# Patient Record
Sex: FEMALE | Race: BLACK OR AFRICAN AMERICAN | Employment: FULL TIME | URBAN - METROPOLITAN AREA
[De-identification: names, ages, dates, MRNs, and addresses within clinical notes are randomized per-mention and may not be internally consistent; named-entity substitution may affect disease eponyms.]

---

## 2017-02-20 ENCOUNTER — OFFICE VISIT (OUTPATIENT)
Dept: INTERNAL MEDICINE CLINIC | Facility: CLINIC | Age: 57
End: 2017-02-20

## 2017-02-20 VITALS
SYSTOLIC BLOOD PRESSURE: 118 MMHG | DIASTOLIC BLOOD PRESSURE: 70 MMHG | BODY MASS INDEX: 29.03 KG/M2 | WEIGHT: 196 LBS | HEART RATE: 70 BPM | TEMPERATURE: 98 F | HEIGHT: 69 IN | OXYGEN SATURATION: 98 % | RESPIRATION RATE: 16 BRPM

## 2017-02-20 DIAGNOSIS — E03.9 ACQUIRED HYPOTHYROIDISM: ICD-10-CM

## 2017-02-20 DIAGNOSIS — I10 ESSENTIAL HYPERTENSION: Primary | ICD-10-CM

## 2017-02-20 DIAGNOSIS — H40.10X0 OPEN-ANGLE GLAUCOMA OF LEFT EYE, UNSPECIFIED GLAUCOMA STAGE, UNSPECIFIED OPEN-ANGLE GLAUCOMA TYPE: ICD-10-CM

## 2017-02-20 DIAGNOSIS — Z86.19 HISTORY OF HPV INFECTION: ICD-10-CM

## 2017-02-20 PROCEDURE — 99213 OFFICE O/P EST LOW 20 MIN: CPT | Performed by: INTERNAL MEDICINE

## 2017-02-20 RX ORDER — VALACYCLOVIR HYDROCHLORIDE 500 MG/1
500 TABLET, FILM COATED ORAL 3 TIMES DAILY
Qty: 90 TABLET | Refills: 1 | Status: SHIPPED | OUTPATIENT
Start: 2017-02-20 | End: 2018-10-02

## 2017-02-20 NOTE — PROGRESS NOTES
Jacky Sharp is a 64year old female. Patient presents with:  Physical      HPI:       Patient moved back to [place] after living in [place] for 11 years. Mucus lower throat 2 weeks, wont go away, clear; bad taste in mouth. Not feeling sick.    Du System:  CONSTITUTION: denies fevers,  chills, or sweats  HEENT: she wishes referral to opthamologist  CARDIOVASCULAR: denies chest pain, denies palpitations  RESPIRATORY:denies cough, denies shortness of breath, denies wheezing  GASTROINTESTINAL: denies a old.  Patient advised to have her son see ophthalmology.   Referral to ophthalmologist given today    (E03.9) Acquired hypothyroidism  Plan: FREE T4 (FREE THYROXINE), ASSAY, THYROID STIM         HORMONE        Check TSH, free T4    (Z86.19) History of HPV i

## 2017-02-21 NOTE — PATIENT INSTRUCTIONS
With patient's complaints of mucus, advised her to drink copious warm fluids such as hot tea with lemon. Snacks as needed call if any worsening or problems.

## 2017-02-22 ENCOUNTER — NURSE ONLY (OUTPATIENT)
Dept: INTERNAL MEDICINE CLINIC | Facility: CLINIC | Age: 57
End: 2017-02-22

## 2017-02-22 DIAGNOSIS — I10 HTN (HYPERTENSION), BENIGN: Primary | ICD-10-CM

## 2017-02-22 DIAGNOSIS — I10 ESSENTIAL HYPERTENSION: ICD-10-CM

## 2017-02-22 DIAGNOSIS — E03.9 HYPOTHYROIDISM, UNSPECIFIED TYPE: ICD-10-CM

## 2017-02-22 DIAGNOSIS — E03.9 ACQUIRED HYPOTHYROIDISM: ICD-10-CM

## 2017-02-22 LAB
ALBUMIN SERPL BCP-MCNC: 4 G/DL (ref 3.5–4.8)
ALBUMIN/GLOB SERPL: 1.3 {RATIO} (ref 1–2)
ALP SERPL-CCNC: 56 U/L (ref 32–100)
ALT SERPL-CCNC: 20 U/L (ref 14–54)
ANION GAP SERPL CALC-SCNC: 9 MMOL/L (ref 0–18)
AST SERPL-CCNC: 23 U/L (ref 15–41)
BASOPHILS # BLD: 0.1 K/UL (ref 0–0.2)
BASOPHILS NFR BLD: 2 %
BILIRUB SERPL-MCNC: 0.6 MG/DL (ref 0.3–1.2)
BUN SERPL-MCNC: 7 MG/DL (ref 8–20)
BUN/CREAT SERPL: 8.9 (ref 10–20)
CALCIUM SERPL-MCNC: 9.1 MG/DL (ref 8.5–10.5)
CHLORIDE SERPL-SCNC: 101 MMOL/L (ref 95–110)
CHOLEST SERPL-MCNC: 185 MG/DL (ref 110–200)
CO2 SERPL-SCNC: 27 MMOL/L (ref 22–32)
CREAT SERPL-MCNC: 0.79 MG/DL (ref 0.5–1.5)
EOSINOPHIL # BLD: 0.1 K/UL (ref 0–0.7)
EOSINOPHIL NFR BLD: 2 %
ERYTHROCYTE [DISTWIDTH] IN BLOOD BY AUTOMATED COUNT: 14.2 % (ref 11–15)
GLOBULIN PLAS-MCNC: 3.1 G/DL (ref 2.5–3.7)
GLUCOSE SERPL-MCNC: 77 MG/DL (ref 70–99)
HCT VFR BLD AUTO: 39.6 % (ref 35–48)
HDLC SERPL-MCNC: 57 MG/DL
HGB BLD-MCNC: 12.8 G/DL (ref 12–16)
LDLC SERPL CALC-MCNC: 111 MG/DL (ref 0–99)
LYMPHOCYTES # BLD: 1.3 K/UL (ref 1–4)
LYMPHOCYTES NFR BLD: 40 %
MCH RBC QN AUTO: 29.2 PG (ref 27–32)
MCHC RBC AUTO-ENTMCNC: 32.4 G/DL (ref 32–37)
MCV RBC AUTO: 90.2 FL (ref 80–100)
MONOCYTES # BLD: 0.2 K/UL (ref 0–1)
MONOCYTES NFR BLD: 7 %
NEUTROPHILS # BLD AUTO: 1.6 K/UL (ref 1.8–7.7)
NEUTROPHILS NFR BLD: 49 %
NONHDLC SERPL-MCNC: 128 MG/DL
OSMOLALITY UR CALC.SUM OF ELEC: 281 MOSM/KG (ref 275–295)
PLATELET # BLD AUTO: 204 K/UL (ref 140–400)
PMV BLD AUTO: 10 FL (ref 7.4–10.3)
POTASSIUM SERPL-SCNC: 3.7 MMOL/L (ref 3.3–5.1)
PROT SERPL-MCNC: 7.1 G/DL (ref 5.9–8.4)
RBC # BLD AUTO: 4.39 M/UL (ref 3.7–5.4)
SODIUM SERPL-SCNC: 137 MMOL/L (ref 136–144)
T4 FREE SERPL-MCNC: 0.91 NG/DL (ref 0.58–1.64)
TRIGL SERPL-MCNC: 86 MG/DL (ref 1–149)
TSH SERPL-ACNC: 0.98 UIU/ML (ref 0.34–5.6)
WBC # BLD AUTO: 3.2 K/UL (ref 4–11)

## 2017-02-22 PROCEDURE — 86803 HEPATITIS C AB TEST: CPT | Performed by: INTERNAL MEDICINE

## 2017-02-22 PROCEDURE — 84439 ASSAY OF FREE THYROXINE: CPT | Performed by: INTERNAL MEDICINE

## 2017-02-22 PROCEDURE — 82306 VITAMIN D 25 HYDROXY: CPT | Performed by: INTERNAL MEDICINE

## 2017-02-22 PROCEDURE — 84443 ASSAY THYROID STIM HORMONE: CPT | Performed by: INTERNAL MEDICINE

## 2017-02-22 PROCEDURE — 80053 COMPREHEN METABOLIC PANEL: CPT | Performed by: INTERNAL MEDICINE

## 2017-02-22 PROCEDURE — 85025 COMPLETE CBC W/AUTO DIFF WBC: CPT | Performed by: INTERNAL MEDICINE

## 2017-02-22 PROCEDURE — 80061 LIPID PANEL: CPT | Performed by: INTERNAL MEDICINE

## 2017-02-22 PROCEDURE — 36415 COLL VENOUS BLD VENIPUNCTURE: CPT | Performed by: INTERNAL MEDICINE

## 2017-02-22 NOTE — PROGRESS NOTES
Patient presented in office today for fasting blood draw. Blood draw successful in left arm  Salvador:  Cbc,cmp,lipid,tsh,HCV,HIV,vitamin D  D. O.B verified   Orders per Doctor Co

## 2017-02-23 ENCOUNTER — TELEPHONE (OUTPATIENT)
Dept: INTERNAL MEDICINE CLINIC | Facility: CLINIC | Age: 57
End: 2017-02-23

## 2017-02-23 DIAGNOSIS — S69.92XA INJURY TO THUMB (NAIL), LEFT, INITIAL ENCOUNTER: Primary | ICD-10-CM

## 2017-02-23 LAB — HCV AB SERPL QL IA: NONREACTIVE

## 2017-02-24 LAB — 25(OH)D3 SERPL-MCNC: 28.5 NG/ML

## 2017-02-27 NOTE — TELEPHONE ENCOUNTER
----- Message from Oj Hdez MD sent at 2/25/2017  3:09 PM CST -----  Please make sure that patient has another appointment with Dr. Jorge Mcneal or myself regarding abnormal labs, such as low vitamin D and neutropenia.   I would like appointment to be w

## 2017-03-15 ENCOUNTER — NURSE ONLY (OUTPATIENT)
Dept: INTERNAL MEDICINE CLINIC | Facility: CLINIC | Age: 57
End: 2017-03-15

## 2017-03-15 DIAGNOSIS — Z12.11 SCREEN FOR COLON CANCER: Primary | ICD-10-CM

## 2017-03-15 LAB — HEMOCCULT STL QL: NEGATIVE

## 2017-03-15 PROCEDURE — 82274 ASSAY TEST FOR BLOOD FECAL: CPT | Performed by: INTERNAL MEDICINE

## 2017-03-16 ENCOUNTER — OFFICE VISIT (OUTPATIENT)
Dept: INTERNAL MEDICINE CLINIC | Facility: CLINIC | Age: 57
End: 2017-03-16

## 2017-03-16 DIAGNOSIS — E03.8 OTHER SPECIFIED HYPOTHYROIDISM: ICD-10-CM

## 2017-03-16 DIAGNOSIS — J30.89 NON-SEASONAL ALLERGIC RHINITIS DUE TO OTHER ALLERGIC TRIGGER: ICD-10-CM

## 2017-03-16 DIAGNOSIS — Z12.11 ENCOUNTER FOR SCREENING COLONOSCOPY: ICD-10-CM

## 2017-03-16 DIAGNOSIS — D70.9 NEUTROPENIA, UNSPECIFIED TYPE (HCC): Primary | ICD-10-CM

## 2017-03-16 DIAGNOSIS — E55.9 VITAMIN D DEFICIENCY: ICD-10-CM

## 2017-03-16 DIAGNOSIS — I10 ESSENTIAL HYPERTENSION: ICD-10-CM

## 2017-03-16 LAB
FOLATE SERPL-MCNC: 11.1 NG/ML
VIT B12 SERPL-MCNC: 837 PG/ML (ref 181–914)

## 2017-03-16 PROCEDURE — 93000 ELECTROCARDIOGRAM COMPLETE: CPT | Performed by: INTERNAL MEDICINE

## 2017-03-16 PROCEDURE — 99214 OFFICE O/P EST MOD 30 MIN: CPT | Performed by: INTERNAL MEDICINE

## 2017-03-16 PROCEDURE — 82746 ASSAY OF FOLIC ACID SERUM: CPT | Performed by: INTERNAL MEDICINE

## 2017-03-16 PROCEDURE — 82607 VITAMIN B-12: CPT | Performed by: INTERNAL MEDICINE

## 2017-03-16 RX ORDER — DORZOLAMIDE HYDROCHLORIDE AND TIMOLOL MALEATE 20; 5 MG/ML; MG/ML
1 SOLUTION/ DROPS OPHTHALMIC 2 TIMES DAILY
COMMUNITY
End: 2017-06-20

## 2017-03-16 RX ORDER — MOMETASONE 50 UG/1
1 SPRAY, METERED NASAL DAILY
Qty: 1 BOTTLE | Refills: 0 | Status: SHIPPED | OUTPATIENT
Start: 2017-03-16 | End: 2017-04-15

## 2017-03-16 NOTE — PROGRESS NOTES
Lisa Martin is a 64year old female. Patient presents with:  Lab Results: Pt presents to clinic as f/up to discuss recent lab results from late February physical.   Wrist Pain: Pt also c/o left wrist pain x 3wks after workout.        HPI:         Still Alcohol Use: Yes           0.0 oz/week       0 Standard drinks or equivalent per week       Comment: OCC         Review of System:  CONSTITUTION: denies fevers,  chills, or sweats  HEENT: denies sore throat  CARDIOVASCULAR: denies chest pain, denies palpi Discussed possible allergic triggers.   Advised how to use steroid nasal sprays    (I10) Essential hypertension  Plan: ELECTROCARDIOGRAM, COMPLETE       Patient taking one half of valsartan daily, to follow blood pressure at home and take home blood pressur

## 2017-03-17 VITALS
BODY MASS INDEX: 29.33 KG/M2 | WEIGHT: 198 LBS | SYSTOLIC BLOOD PRESSURE: 132 MMHG | OXYGEN SATURATION: 99 % | RESPIRATION RATE: 12 BRPM | HEART RATE: 58 BPM | HEIGHT: 69 IN | DIASTOLIC BLOOD PRESSURE: 88 MMHG

## 2017-03-21 RX ORDER — LEVOTHYROXINE SODIUM 75 MCG
75 TABLET ORAL
Qty: 90 TABLET | Refills: 1 | Status: SHIPPED | OUTPATIENT
Start: 2017-03-21 | End: 2017-09-12

## 2017-04-10 ENCOUNTER — OFFICE VISIT (OUTPATIENT)
Dept: INTERNAL MEDICINE CLINIC | Facility: CLINIC | Age: 57
End: 2017-04-10

## 2017-04-10 ENCOUNTER — MED REC SCAN ONLY (OUTPATIENT)
Dept: INTERNAL MEDICINE CLINIC | Facility: CLINIC | Age: 57
End: 2017-04-10

## 2017-04-10 VITALS
WEIGHT: 199 LBS | TEMPERATURE: 99 F | SYSTOLIC BLOOD PRESSURE: 110 MMHG | HEART RATE: 71 BPM | HEIGHT: 69 IN | RESPIRATION RATE: 12 BRPM | BODY MASS INDEX: 29.47 KG/M2 | OXYGEN SATURATION: 98 % | DIASTOLIC BLOOD PRESSURE: 80 MMHG

## 2017-04-10 DIAGNOSIS — E03.9 ACQUIRED HYPOTHYROIDISM: ICD-10-CM

## 2017-04-10 DIAGNOSIS — M65.4 DE QUERVAIN'S TENOSYNOVITIS, LEFT: Primary | ICD-10-CM

## 2017-04-10 PROCEDURE — 99212 OFFICE O/P EST SF 10 MIN: CPT | Performed by: INTERNAL MEDICINE

## 2017-04-10 NOTE — PROGRESS NOTES
Ghanshyam Lawson is a 64year old female. Patient presents with:  Wrist Pain: pt presents to clinic c/o left wrist pain. pt injured wrist lifting weights       HPI:       Soledad Lockett had acute pain in her left wrist after lifting 5 pound weights March 16, 2017. Smokeless Status: Never Used                        Comment: never smoked     Alcohol Use: Yes           0.0 oz/week       0 Standard drinks or equivalent per week       Comment: OCC         Review of System:  CONSTITUTION: denies fevers,  chills, or sweat

## 2017-04-11 ENCOUNTER — HOSPITAL ENCOUNTER (OUTPATIENT)
Dept: GENERAL RADIOLOGY | Facility: HOSPITAL | Age: 57
Discharge: HOME | End: 2017-04-11
Attending: INTERNAL MEDICINE
Payer: COMMERCIAL

## 2017-04-11 DIAGNOSIS — M65.4 DE QUERVAIN'S TENOSYNOVITIS, LEFT: ICD-10-CM

## 2017-04-11 PROCEDURE — 73110 X-RAY EXAM OF WRIST: CPT

## 2017-04-14 ENCOUNTER — TELEPHONE (OUTPATIENT)
Dept: INTERNAL MEDICINE CLINIC | Facility: CLINIC | Age: 57
End: 2017-04-14

## 2017-04-14 DIAGNOSIS — M25.532 LEFT WRIST PAIN: Primary | ICD-10-CM

## 2017-04-21 NOTE — TELEPHONE ENCOUNTER
Patient states she is feeling generally better, but still has some issues with her wrist such as when she was lifting a laundry basket. X-ray reviewed with her, and she is advised of results. She is referred to Dr. Gena Blum for evaluation.

## 2017-04-23 ENCOUNTER — TELEPHONE (OUTPATIENT)
Dept: INTERNAL MEDICINE CLINIC | Facility: CLINIC | Age: 57
End: 2017-04-23

## 2017-04-24 NOTE — TELEPHONE ENCOUNTER
Patient was called April 21 regarding report of wrist x-ray. She is still having discomfort in that wrist.  X-ray report reviewed with patient. She is referred to Dr. Maureen Springer orthopedic surgery.

## 2017-06-10 ENCOUNTER — OFFICE VISIT (OUTPATIENT)
Dept: OPHTHALMOLOGY | Facility: CLINIC | Age: 57
End: 2017-06-10

## 2017-06-10 DIAGNOSIS — H40.003 GLAUCOMA SUSPECT OF BOTH EYES: Primary | ICD-10-CM

## 2017-06-10 DIAGNOSIS — H25.13 AGE-RELATED NUCLEAR CATARACT OF BOTH EYES: ICD-10-CM

## 2017-06-10 DIAGNOSIS — Z83.511 FAMILY HISTORY OF GLAUCOMA IN MOTHER: ICD-10-CM

## 2017-06-10 DIAGNOSIS — Z83.511 FAMILY HISTORY OF GLAUCOMA IN SISTER: ICD-10-CM

## 2017-06-10 PROCEDURE — 99243 OFF/OP CNSLTJ NEW/EST LOW 30: CPT | Performed by: OPHTHALMOLOGY

## 2017-06-10 PROCEDURE — 92250 FUNDUS PHOTOGRAPHY W/I&R: CPT | Performed by: OPHTHALMOLOGY

## 2017-06-10 PROCEDURE — 99213 OFFICE O/P EST LOW 20 MIN: CPT | Performed by: OPHTHALMOLOGY

## 2017-06-10 RX ORDER — ESTRADIOL 0.1 MG/G
CREAM VAGINAL
COMMUNITY
Start: 2017-04-20 | End: 2018-02-21 | Stop reason: ALTCHOICE

## 2017-06-10 NOTE — PROGRESS NOTES
Maggy Mayfield is a 64year old female.     HPI:     HPI     Consult    Additional comments: Consult per Dr. Carrillo Reyesshania was diagnosed with glaucoma OU in 2008 by Dr. Ion France and is currently taking Latanoprost OU qhs and Dorzola Rfl: 1   latanoprost 0.005 % Ophthalmic Solution  Disp:  Rfl:    Valsartan-Hydrochlorothiazide 80-12.5 MG Oral Tab Take 1 tablet by mouth daily.  Patient takes half daily  Disp:  Rfl:        Allergies:    Seasonal                    ROS:     ROS     Positiv ASSESSMENT/PLAN:     Diagnoses and Plan:     Glaucoma suspect of both eyes  Pt was diagnosed with glaucoma by Dr. Antonio Tian in 2008. Intraocular pressure stable, tolerating medications.   Will continue same regimen of Latanoprost; one drop in both e

## 2017-06-10 NOTE — PATIENT INSTRUCTIONS
Glaucoma suspect of both eyes  Pt was diagnosed with glaucoma by Dr. Jami Cesar in 2008. Intraocular pressure stable, tolerating medications.   Will continue same regimen of Latanoprost; one drop in both eyes at bedtime and Dorzolamide- Timolol twice a

## 2017-06-10 NOTE — ASSESSMENT & PLAN NOTE
Pt was diagnosed with glaucoma by Dr. Jadyn Hamlin in 2008. Intraocular pressure stable, tolerating medications. Will continue same regimen of Latanoprost; one drop in both eyes at bedtime and Dorzolamide- Timolol twice a day in both eyes. (she has enou

## 2017-06-17 ENCOUNTER — OFFICE VISIT (OUTPATIENT)
Dept: OPHTHALMOLOGY | Facility: CLINIC | Age: 57
End: 2017-06-17

## 2017-06-17 DIAGNOSIS — H40.003 GLAUCOMA SUSPECT OF BOTH EYES: ICD-10-CM

## 2017-06-17 PROCEDURE — 92083 EXTENDED VISUAL FIELD XM: CPT | Performed by: OPHTHALMOLOGY

## 2017-06-17 PROCEDURE — 92133 CPTRZD OPH DX IMG PST SGM ON: CPT | Performed by: OPHTHALMOLOGY

## 2017-06-17 PROCEDURE — 76514 ECHO EXAM OF EYE THICKNESS: CPT | Performed by: OPHTHALMOLOGY

## 2017-06-20 ENCOUNTER — TELEPHONE (OUTPATIENT)
Dept: OPHTHALMOLOGY | Facility: CLINIC | Age: 57
End: 2017-06-20

## 2017-06-20 NOTE — TELEPHONE ENCOUNTER
Spoke with patient and verbally told her that her glaucoma tests were normal in both eyes. I told her that Dr. Stephen Fisher wants to closely follow her pressure, but wants her to stop the Cosopt drops and continue the Latanoprost qhs OU.   Appt was scheduled f

## 2017-06-20 NOTE — PROGRESS NOTES
Alex Granda is a 64year old female. HPI:     HPI     Patient here for a glaucoma work up with HVF, OCT and Pachy, no M.D.        Last edited by Isauro Fajardo on 6/17/2017  8:11 AM.     Patient History:  Past Medical History   Diagnosis Date   • Lan ASSESSMENT/PLAN:     Diagnoses and Plan:     Glaucoma suspect of both eyes  Normal VF, OCT, OU. Continue Latanoprost OU Q HS. Discontinue Cosopt OU BID. No orders of the defined types were placed in this encounter.        Meds This Visi

## 2017-07-08 ENCOUNTER — OFFICE VISIT (OUTPATIENT)
Dept: OPHTHALMOLOGY | Facility: CLINIC | Age: 57
End: 2017-07-08

## 2017-07-08 DIAGNOSIS — Z83.511 FAMILY HISTORY OF GLAUCOMA IN SISTER: ICD-10-CM

## 2017-07-08 DIAGNOSIS — H40.003 GLAUCOMA SUSPECT OF BOTH EYES: Primary | ICD-10-CM

## 2017-07-08 DIAGNOSIS — Z83.511 FAMILY HISTORY OF GLAUCOMA IN MOTHER: ICD-10-CM

## 2017-07-08 PROCEDURE — 99212 OFFICE O/P EST SF 10 MIN: CPT | Performed by: OPHTHALMOLOGY

## 2017-07-08 PROCEDURE — 99213 OFFICE O/P EST LOW 20 MIN: CPT | Performed by: OPHTHALMOLOGY

## 2017-07-08 NOTE — PATIENT INSTRUCTIONS
Glaucoma suspect of both eyes  IOP is stable after discontinuing Cosopt in June of 2017 due to normal visual field and OCT. Continue Latanoprost at bedtime in both eyes. Will have patient back in 2 months for a pressure check.      Family history of glau

## 2017-07-08 NOTE — ASSESSMENT & PLAN NOTE
IOP is stable after discontinuing Cosopt in June of 2017 due to normal visual field and OCT. Continue Latanoprost at bedtime in both eyes. Will have patient back in 2 months for a pressure check.

## 2017-07-08 NOTE — PROGRESS NOTES
Fanta Hernandez is a 64year old female. HPI:     HPI     Patient is here for an IOP check after Cosopt was discontinued on 6/20/17 after normal VF and OCT. She still is using Latanoprost qhs OU.   She has not noticed any problems or changes with her eye 80-12.5 MG Oral Tab Take 1 tablet by mouth daily.  Patient takes half daily  Disp:  Rfl:        Allergies:    Seasonal                    ROS:       PHYSICAL EXAM:     Base Eye Exam     Visual Acuity (Snellen - Linear)       Right Left    Dist cc 20/20 20/2

## 2017-08-16 ENCOUNTER — TELEPHONE (OUTPATIENT)
Dept: INTERNAL MEDICINE CLINIC | Facility: CLINIC | Age: 57
End: 2017-08-16

## 2017-08-30 ENCOUNTER — TELEPHONE (OUTPATIENT)
Dept: OPHTHALMOLOGY | Facility: CLINIC | Age: 57
End: 2017-08-30

## 2017-09-12 RX ORDER — LEVOTHYROXINE SODIUM 75 MCG
75 TABLET ORAL
Qty: 90 TABLET | Refills: 1 | Status: SHIPPED | OUTPATIENT
Start: 2017-09-12 | End: 2017-10-11 | Stop reason: DRUGHIGH

## 2017-09-16 ENCOUNTER — OFFICE VISIT (OUTPATIENT)
Dept: OPHTHALMOLOGY | Facility: CLINIC | Age: 57
End: 2017-09-16

## 2017-09-16 DIAGNOSIS — Z83.511 FAMILY HISTORY OF GLAUCOMA IN SISTER: ICD-10-CM

## 2017-09-16 DIAGNOSIS — Z83.511 FAMILY HISTORY OF GLAUCOMA IN MOTHER: ICD-10-CM

## 2017-09-16 DIAGNOSIS — H40.003 GLAUCOMA SUSPECT OF BOTH EYES: Primary | ICD-10-CM

## 2017-09-16 PROCEDURE — 99212 OFFICE O/P EST SF 10 MIN: CPT | Performed by: OPHTHALMOLOGY

## 2017-09-16 PROCEDURE — 99213 OFFICE O/P EST LOW 20 MIN: CPT | Performed by: OPHTHALMOLOGY

## 2017-09-16 RX ORDER — VALSARTAN AND HYDROCHLOROTHIAZIDE 80; 12.5 MG/1; MG/1
1 TABLET, FILM COATED ORAL
COMMUNITY
End: 2017-09-16 | Stop reason: CLARIF

## 2017-09-16 RX ORDER — 0.9 % SODIUM CHLORIDE 0.9 %
VIAL (ML) INJECTION
COMMUNITY
Start: 2017-03-30 | End: 2017-10-11

## 2017-09-16 RX ORDER — LEVOTHYROXINE SODIUM 0.07 MG/1
75 TABLET ORAL
COMMUNITY
End: 2017-11-17

## 2017-09-16 RX ORDER — LATANOPROST 50 UG/ML
1 SOLUTION/ DROPS OPHTHALMIC NIGHTLY
Qty: 3 BOTTLE | Refills: 3 | Status: CANCELLED | OUTPATIENT
Start: 2017-09-16

## 2017-09-16 NOTE — PROGRESS NOTES
Maggy Mayfield is a 64year old female. HPI:     HPI     EP. Patient is here for an IOP check. She is taking Latanoprost qhs OU as directed. (Cosopt was d/c'd  in July of 2017 due to normal VF & OCT).   Patient states that her left eye has intermittent physician's instructions Disp: 1 Bottle Rfl: 0   ESTRACE 0.1 MG/GM Vaginal Cream  Disp:  Rfl:    ValACYclovir HCl 500 MG Oral Tab Take 1 tablet (500 mg total) by mouth 3 (three) times daily.  Disp: 90 tablet Rfl: 1   Valsartan-Hydrochlorothiazide 80-12.5 MG this encounter        Follow up instructions:  Return in 4 weeks (on 10/14/2017) for IOP check.     9/16/2017  Scribed by: Danisha Perez MD

## 2017-09-16 NOTE — ASSESSMENT & PLAN NOTE
IOP is stable after discontinuing Cosopt in June of 2017 due to normal visual field and OCT. Will also have patient discontinue Latanoprost at this time, but will follow very closely in 1 month for a pressure check.

## 2017-09-16 NOTE — PATIENT INSTRUCTIONS
Glaucoma suspect of both eyes  IOP is stable after discontinuing Cosopt in June of 2017 due to normal visual field and OCT. Will also have patient discontinue Latanoprost at this time, but will follow very closely in 1 month for a pressure check.      Aleksandra Franklin

## 2017-09-21 ENCOUNTER — TELEPHONE (OUTPATIENT)
Dept: INTERNAL MEDICINE CLINIC | Facility: CLINIC | Age: 57
End: 2017-09-21

## 2017-10-09 ENCOUNTER — TELEPHONE (OUTPATIENT)
Dept: OPHTHALMOLOGY | Facility: CLINIC | Age: 57
End: 2017-10-09

## 2017-10-09 NOTE — TELEPHONE ENCOUNTER
Patient states in order to get Medical records from previous Ophthalmologist needs IVETTE form signed and faxed to [phone] Dr Maria Antonia Mckeon. Please advise. Thank you.

## 2017-10-09 NOTE — TELEPHONE ENCOUNTER
Pt will stop at the  to sign medical records release of information and I will fax it to Dr. Cecilia Tamayo once pt signes it.

## 2017-10-11 ENCOUNTER — OFFICE VISIT (OUTPATIENT)
Dept: INTERNAL MEDICINE CLINIC | Facility: CLINIC | Age: 57
End: 2017-10-11

## 2017-10-11 VITALS
HEIGHT: 69 IN | SYSTOLIC BLOOD PRESSURE: 133 MMHG | DIASTOLIC BLOOD PRESSURE: 86 MMHG | WEIGHT: 198 LBS | BODY MASS INDEX: 29.33 KG/M2 | RESPIRATION RATE: 18 BRPM | HEART RATE: 57 BPM

## 2017-10-11 DIAGNOSIS — E03.9 ACQUIRED HYPOTHYROIDISM: Primary | ICD-10-CM

## 2017-10-11 DIAGNOSIS — I10 HTN (HYPERTENSION), BENIGN: ICD-10-CM

## 2017-10-11 DIAGNOSIS — D70.8 OTHER NEUTROPENIA (HCC): ICD-10-CM

## 2017-10-11 DIAGNOSIS — E55.9 VITAMIN D DEFICIENCY: ICD-10-CM

## 2017-10-11 DIAGNOSIS — Z00.00 ROUTINE ADULT HEALTH MAINTENANCE: ICD-10-CM

## 2017-10-11 PROCEDURE — 99212 OFFICE O/P EST SF 10 MIN: CPT | Performed by: INTERNAL MEDICINE

## 2017-10-11 PROCEDURE — 99205 OFFICE O/P NEW HI 60 MIN: CPT | Performed by: INTERNAL MEDICINE

## 2017-10-11 RX ORDER — DORZOLAMIDE HCL 20 MG/ML
1 SOLUTION/ DROPS OPHTHALMIC NIGHTLY
COMMUNITY
End: 2017-10-30 | Stop reason: ALTCHOICE

## 2017-10-11 NOTE — PROGRESS NOTES
HPI:    Patient ID: Sonia Perez is a 64year old female. Hypertension   This is a chronic problem. The current episode started more than 1 year ago. The problem has been waxing and waning since onset. Associated symptoms include anxiety.  Pertinent ne 03/29/2018  Result Impression   THERE IS NO RADIOGRAPHIC EVIDENCE OF MALIGNANCY. THERE IS NO SIGNIFICANT INTERVAL CHANGE.     IF PHYSICAL EXAMINATION IS NORMAL AND THERE IS NO INTERVAL CHANGE IN CLINICAL BREAST EXAM, ANNUAL MAMMOGRAPHIC SCREENING IS JENNIFER Psychiatric/Behavioral: Negative. Current Outpatient Prescriptions:  Dorzolamide HCl 2 % Ophthalmic Solution Place 1 drop into both eyes nightly. Disp:  Rfl:    Levothyroxine Sodium 75 MCG Oral Tab Take 75 mcg by mouth.  Disp:  Rfl:    ESTRA normal reflexes. No cranial nerve deficit. She exhibits normal muscle tone. Coordination normal.   Skin: No rash noted. No erythema. Psychiatric: She has a normal mood and affect.  Her behavior is normal. Thought content normal.   Nursing note and vitals CBC W Differential W Platelet [E]      Comp Metabolic Panel (14) [E]      Lipid Panel [E]      Microalb/Creat Ratio, Random Urine [E]      Urinalysis, Routine [E]      Vitamin D, 25-Hydroxy [E]      Vitamin B12 [E]      TSH [E]    Meds This Visit:    No pr

## 2017-10-11 NOTE — ASSESSMENT & PLAN NOTE
Patient has been on levothyroxine at 75 mcg 1 tablet once daily which she has tolerated well. Continue the same dose of medications and will recheck labs as ordered.

## 2017-10-11 NOTE — ASSESSMENT & PLAN NOTE
Blood pressure 133/86, pulse 57, resp. rate 18, height 5' 9\" (1.753 m), weight 198 lb (89.8 kg), not currently breastfeeding. Recheck blood pressure was 120/78. Patient is currently on valsartan hydrochlorothiazide 80/12. 5–patient has been taking only a

## 2017-10-11 NOTE — ASSESSMENT & PLAN NOTE
New pt, transfer of care. History of hypertension and hypothyroidism both stable at this time. Patient has been watching his diet and exercising to lose weight. Body mass index is 29.24 kg/m². Due for recheck labs which have been ordered.   Immunizatio

## 2017-10-11 NOTE — TELEPHONE ENCOUNTER
Spoke to pt and states that she would like IVETTE mailed to her home address and she will fax it back.

## 2017-10-11 NOTE — ASSESSMENT & PLAN NOTE
Asymptomatic, stable but persistent neutropenia.   Recheck has been ordered and will follow up after completion

## 2017-10-11 NOTE — PATIENT INSTRUCTIONS
Problem List Items Addressed This Visit        Unprioritized    HTN (hypertension), benign     Blood pressure 133/86, pulse 57, resp. rate 18, height 5' 9\" (1.753 m), weight 198 lb (89.8 kg), not currently breastfeeding.   Recheck blood pressure was 120/78

## 2017-10-14 ENCOUNTER — OFFICE VISIT (OUTPATIENT)
Dept: OPHTHALMOLOGY | Facility: CLINIC | Age: 57
End: 2017-10-14

## 2017-10-14 DIAGNOSIS — H40.003 GLAUCOMA SUSPECT OF BOTH EYES: Primary | ICD-10-CM

## 2017-10-14 PROCEDURE — 99212 OFFICE O/P EST SF 10 MIN: CPT | Performed by: OPHTHALMOLOGY

## 2017-10-14 PROCEDURE — 99213 OFFICE O/P EST LOW 20 MIN: CPT | Performed by: OPHTHALMOLOGY

## 2017-10-14 NOTE — PATIENT INSTRUCTIONS
Glaucoma suspect of both eyes  IOP is stable after discontinuing Cosopt in June of 2017 due to normal visual field and OCT. Also stopped Latanoprost OU, qhs D/C'd on 9/16/17.      Stay off Latanoprost at this time, but will follow very closely in 3 months

## 2017-10-14 NOTE — ASSESSMENT & PLAN NOTE
IOP is stable after discontinuing Cosopt in June of 2017 due to normal visual field and OCT. Also stopped Latanoprost OU, qhs D/C'd on 9/16/17. Stay off Latanoprost at this time, but will follow very closely in 3 months for a pressure check.

## 2017-10-14 NOTE — PROGRESS NOTES
Jocelin Saldaña is a 64year old female. HPI:     HPI     EP. Patient is here for an IOP check after Latanoprost OU, qhs was D/C'd on 9/16/17. Patient states no ocular complaints or changes.     Last edited by Yancy Lobato O.T. on 10/14/2017  8:51 A (500 mg total) by mouth 3 (three) times daily. Disp: 90 tablet Rfl: 1   Valsartan-Hydrochlorothiazide 80-12.5 MG Oral Tab Take 0.5 tablets by mouth daily.  Patient takes half daily  Disp:  Rfl:        Allergies:    Seasonal                    ROS:       PHY

## 2017-10-21 ENCOUNTER — LAB ENCOUNTER (OUTPATIENT)
Dept: LAB | Facility: HOSPITAL | Age: 57
End: 2017-10-21
Attending: INTERNAL MEDICINE
Payer: COMMERCIAL

## 2017-10-21 ENCOUNTER — TELEPHONE (OUTPATIENT)
Dept: OPHTHALMOLOGY | Facility: CLINIC | Age: 57
End: 2017-10-21

## 2017-10-21 DIAGNOSIS — E03.9 ACQUIRED HYPOTHYROIDISM: ICD-10-CM

## 2017-10-21 DIAGNOSIS — I10 HTN (HYPERTENSION), BENIGN: ICD-10-CM

## 2017-10-21 DIAGNOSIS — Z00.00 ROUTINE ADULT HEALTH MAINTENANCE: ICD-10-CM

## 2017-10-21 DIAGNOSIS — E55.9 VITAMIN D DEFICIENCY: ICD-10-CM

## 2017-10-21 PROCEDURE — 82570 ASSAY OF URINE CREATININE: CPT

## 2017-10-21 PROCEDURE — 80061 LIPID PANEL: CPT

## 2017-10-21 PROCEDURE — 81001 URINALYSIS AUTO W/SCOPE: CPT

## 2017-10-21 PROCEDURE — 82607 VITAMIN B-12: CPT

## 2017-10-21 PROCEDURE — 82306 VITAMIN D 25 HYDROXY: CPT

## 2017-10-21 PROCEDURE — 85025 COMPLETE CBC W/AUTO DIFF WBC: CPT

## 2017-10-21 PROCEDURE — 36415 COLL VENOUS BLD VENIPUNCTURE: CPT

## 2017-10-21 PROCEDURE — 82043 UR ALBUMIN QUANTITATIVE: CPT

## 2017-10-21 PROCEDURE — 84443 ASSAY THYROID STIM HORMONE: CPT

## 2017-10-21 PROCEDURE — 80053 COMPREHEN METABOLIC PANEL: CPT

## 2017-10-23 ENCOUNTER — TELEPHONE (OUTPATIENT)
Dept: OPHTHALMOLOGY | Facility: CLINIC | Age: 57
End: 2017-10-23

## 2017-10-23 NOTE — TELEPHONE ENCOUNTER
Poke with patient. She wanted to know if her IOP was okay since stopping both drops. Told patient that we will check her eye pressure when she comes in for her IOP check I January of 2018.   I told patient a her last visit, Dr. Catalina Cabrera stated that her IO

## 2017-10-28 ENCOUNTER — LAB ENCOUNTER (OUTPATIENT)
Dept: LAB | Facility: HOSPITAL | Age: 57
End: 2017-10-28
Attending: INTERNAL MEDICINE
Payer: COMMERCIAL

## 2017-10-28 DIAGNOSIS — E55.9 VITAMIN D DEFICIENCY: Primary | ICD-10-CM

## 2017-10-28 DIAGNOSIS — E03.8 OTHER SPECIFIED HYPOTHYROIDISM: ICD-10-CM

## 2017-10-28 DIAGNOSIS — E88.81 INSULIN RESISTANCE: ICD-10-CM

## 2017-10-28 DIAGNOSIS — E78.2 MIXED HYPERLIPIDEMIA: ICD-10-CM

## 2017-10-28 DIAGNOSIS — E06.3 AUTOIMMUNE THYROIDITIS: ICD-10-CM

## 2017-10-28 PROCEDURE — 82306 VITAMIN D 25 HYDROXY: CPT

## 2017-10-28 PROCEDURE — 83036 HEMOGLOBIN GLYCOSYLATED A1C: CPT

## 2017-10-28 PROCEDURE — 36415 COLL VENOUS BLD VENIPUNCTURE: CPT

## 2017-10-28 PROCEDURE — 84439 ASSAY OF FREE THYROXINE: CPT

## 2017-10-28 PROCEDURE — 84443 ASSAY THYROID STIM HORMONE: CPT

## 2017-10-28 PROCEDURE — 80061 LIPID PANEL: CPT

## 2017-10-28 PROCEDURE — 85025 COMPLETE CBC W/AUTO DIFF WBC: CPT

## 2017-10-28 PROCEDURE — 80053 COMPREHEN METABOLIC PANEL: CPT

## 2017-10-30 ENCOUNTER — OFFICE VISIT (OUTPATIENT)
Dept: HEMATOLOGY/ONCOLOGY | Facility: HOSPITAL | Age: 57
End: 2017-10-30
Attending: INTERNAL MEDICINE
Payer: COMMERCIAL

## 2017-10-30 VITALS
DIASTOLIC BLOOD PRESSURE: 81 MMHG | RESPIRATION RATE: 18 BRPM | HEART RATE: 54 BPM | HEIGHT: 69 IN | WEIGHT: 198 LBS | BODY MASS INDEX: 29.33 KG/M2 | TEMPERATURE: 98 F | SYSTOLIC BLOOD PRESSURE: 164 MMHG

## 2017-10-30 DIAGNOSIS — E03.8 OTHER SPECIFIED HYPOTHYROIDISM: ICD-10-CM

## 2017-10-30 DIAGNOSIS — Z00.00 HEALTHCARE MAINTENANCE: ICD-10-CM

## 2017-10-30 DIAGNOSIS — D70.8 OTHER NEUTROPENIA (HCC): Primary | ICD-10-CM

## 2017-10-30 DIAGNOSIS — I10 ESSENTIAL HYPERTENSION: ICD-10-CM

## 2017-10-30 PROCEDURE — 99212 OFFICE O/P EST SF 10 MIN: CPT | Performed by: INTERNAL MEDICINE

## 2017-10-30 PROCEDURE — 99245 OFF/OP CONSLTJ NEW/EST HI 55: CPT | Performed by: INTERNAL MEDICINE

## 2017-10-30 RX ORDER — LATANOPROST 50 UG/ML
SOLUTION/ DROPS OPHTHALMIC NIGHTLY
COMMUNITY
End: 2018-01-17

## 2017-10-30 NOTE — PROGRESS NOTES
Hematology Consultation Note    Patient Name: Yuri Cardona   YOB: 1960   Medical Record Number: [id number]   CSN: [id number]   Consulting Physician: Amanda Lr MD  Referring Physician(s):  Estuardo Porras  Date of Consultation: 10/30/2017   2013: REMOVAL OF FALLOPIAN TUBE Bilateral      Comment: ovaries appeared normal    Family Medical History:  Family History   Problem Relation Age of Onset   • Other [OTHER] Father      liver cirrhosis   • Hypertension Mother    • Glaucoma Mother half daily  Disp:  Rfl:        Review of Systems:    Constitutional: Negative for anorexia, chills, fatigue, fevers, night sweats and weight loss. Eyes: Negative for visual disturbance, irritation and redness.   Respiratory: Negative for cough, hemoptysis, MCV 90.2 10/28/2017 12:02 PM   MCH 29.7 10/28/2017 12:02 PM   MCHC 32.9 10/28/2017 12:02 PM   RDW 14.3 10/28/2017 12:02 PM    10/28/2017 12:02 PM         Lab Results  Component Value Date/Time   GLU 89 10/28/2017 12:02 PM   BUN 7 (L) 10/28/2017 12 which is quite common in persons of the [facility]  --Patient has no clinical manifestations related to these lower blood counts that she is without recurrent infections and has already received her annual flu shot  --We have ordered repeat CBC with

## 2017-10-30 NOTE — PROGRESS NOTES
[address] [facility] [address] Gastroenterology                                                                                                  Clinic History and Physical     Pa Medications, Allergies, ROS:      Past Medical History:   Diagnosis Date   • Essential hypertension    • Glaucoma 2006 6/20/17 1st visit- was on Cosopt bid OU and Latanoprost qhs OU- 6/17/17 normal OCT and VF, so D/C Cosopt, but continue Latanoprost qhs total) by mouth 3 (three) times daily. Disp: 90 tablet Rfl: 1   Valsartan-Hydrochlorothiazide 80-12.5 MG Oral Tab Take 0.5 tablets by mouth daily.  Patient takes half daily  Disp:  Rfl:        Allergies:    Seasonal                    ROS:   CONSTITUTIONAL: hypertension, hypothyroid, neutropenia, HPV, glaucoma who presents for colon cancer screening evaluation.     No anemia noted on lab work dated 10/28/2017.    1. Average Risk colonoscopy screening: patient is considered average risk for colon cancer (No fam consult notes           Imaging & Referrals:  None       Krista G. Veronda Councilman, APN  10/30/2017

## 2017-10-31 ENCOUNTER — LAB ENCOUNTER (OUTPATIENT)
Dept: LAB | Facility: HOSPITAL | Age: 57
End: 2017-10-31
Attending: INTERNAL MEDICINE
Payer: COMMERCIAL

## 2017-10-31 ENCOUNTER — TELEPHONE (OUTPATIENT)
Dept: GASTROENTEROLOGY | Facility: CLINIC | Age: 57
End: 2017-10-31

## 2017-10-31 ENCOUNTER — OFFICE VISIT (OUTPATIENT)
Dept: GASTROENTEROLOGY | Facility: CLINIC | Age: 57
End: 2017-10-31

## 2017-10-31 ENCOUNTER — TELEPHONE (OUTPATIENT)
Dept: HEMATOLOGY/ONCOLOGY | Facility: HOSPITAL | Age: 57
End: 2017-10-31

## 2017-10-31 VITALS
SYSTOLIC BLOOD PRESSURE: 131 MMHG | BODY MASS INDEX: 29.23 KG/M2 | DIASTOLIC BLOOD PRESSURE: 81 MMHG | HEART RATE: 64 BPM | HEIGHT: 69 IN | WEIGHT: 197.38 LBS

## 2017-10-31 DIAGNOSIS — Z12.11 SCREENING FOR COLON CANCER: Primary | ICD-10-CM

## 2017-10-31 DIAGNOSIS — D70.8 OTHER NEUTROPENIA (HCC): ICD-10-CM

## 2017-10-31 PROCEDURE — 86038 ANTINUCLEAR ANTIBODIES: CPT

## 2017-10-31 PROCEDURE — 87389 HIV-1 AG W/HIV-1&-2 AB AG IA: CPT

## 2017-10-31 PROCEDURE — 99212 OFFICE O/P EST SF 10 MIN: CPT | Performed by: NURSE PRACTITIONER

## 2017-10-31 PROCEDURE — 36415 COLL VENOUS BLD VENIPUNCTURE: CPT

## 2017-10-31 PROCEDURE — 85025 COMPLETE CBC W/AUTO DIFF WBC: CPT

## 2017-10-31 PROCEDURE — 85060 BLOOD SMEAR INTERPRETATION: CPT

## 2017-10-31 PROCEDURE — 99243 OFF/OP CNSLTJ NEW/EST LOW 30: CPT | Performed by: NURSE PRACTITIONER

## 2017-10-31 PROCEDURE — 80074 ACUTE HEPATITIS PANEL: CPT

## 2017-10-31 NOTE — PATIENT INSTRUCTIONS
1. Schedule colonoscopy with Dr. Chris Gooden w/ IV [place]    2.  bowel prep from pharmacy - split dose Suprep     3. Continue all medications for procedure     4. Read all bowel prep instructions carefully    5.  AVOID seeds, nuts, popcorn,

## 2017-10-31 NOTE — TELEPHONE ENCOUNTER
Scheduled for:  Colonoscopy [address]  Provider Name: Dr. Robert Villar  Date: Friday, 1/19/18  Location:  [facility]  Sedation:  IV sedation  Time:  9:00 am (pt is aware that [facility] will call the day before to confirm arrival time)  Prep: Suprep  Meds/Allergies Reconciled?:  Nica Lemons

## 2017-10-31 NOTE — H&P
[address] [facility] [place] [address] Gastroenterology                                                                                                  Clinic History and Physical     Pa Medications, Allergies, ROS:      Past Medical History:   Diagnosis Date   • Essential hypertension    • Glaucoma 2006 6/20/17 1st visit- was on Cosopt bid OU and Latanoprost qhs OU- 6/17/17 normal OCT and VF, so D/C Cosopt, but continue Latanoprost qhs total) by mouth 3 (three) times daily. Disp: 90 tablet Rfl: 1   Valsartan-Hydrochlorothiazide 80-12.5 MG Oral Tab Take 0.5 tablets by mouth daily.  Patient takes half daily  Disp:  Rfl:        Allergies:    Seasonal                    ROS:   CONSTITUTIONAL: hypertension, hypothyroid, neutropenia, HPV, glaucoma who presents for colon cancer screening evaluation.     No anemia noted on lab work dated 10/28/2017.    1. Average Risk colonoscopy screening: patient is considered average risk for colon cancer (No fam consult notes           Imaging & Referrals:  None       PETER Obrien  10/30/2017

## 2017-11-01 ENCOUNTER — APPOINTMENT (OUTPATIENT)
Dept: HEMATOLOGY/ONCOLOGY | Facility: HOSPITAL | Age: 57
End: 2017-11-01
Attending: INTERNAL MEDICINE
Payer: COMMERCIAL

## 2017-11-06 ENCOUNTER — OFFICE VISIT (OUTPATIENT)
Dept: HEMATOLOGY/ONCOLOGY | Facility: HOSPITAL | Age: 57
End: 2017-11-06
Attending: INTERNAL MEDICINE
Payer: COMMERCIAL

## 2017-11-06 VITALS
TEMPERATURE: 99 F | SYSTOLIC BLOOD PRESSURE: 136 MMHG | HEIGHT: 69 IN | RESPIRATION RATE: 16 BRPM | WEIGHT: 201 LBS | BODY MASS INDEX: 29.77 KG/M2 | HEART RATE: 63 BPM | DIASTOLIC BLOOD PRESSURE: 77 MMHG

## 2017-11-06 DIAGNOSIS — D70.8 OTHER NEUTROPENIA (HCC): Primary | ICD-10-CM

## 2017-11-06 DIAGNOSIS — E03.8 OTHER SPECIFIED HYPOTHYROIDISM: ICD-10-CM

## 2017-11-06 DIAGNOSIS — I10 ESSENTIAL HYPERTENSION: ICD-10-CM

## 2017-11-06 DIAGNOSIS — Z00.00 HEALTHCARE MAINTENANCE: ICD-10-CM

## 2017-11-06 PROCEDURE — 99212 OFFICE O/P EST SF 10 MIN: CPT | Performed by: INTERNAL MEDICINE

## 2017-11-06 PROCEDURE — 99214 OFFICE O/P EST MOD 30 MIN: CPT | Performed by: INTERNAL MEDICINE

## 2017-11-06 NOTE — PROGRESS NOTES
Hematology Progress Note    Patient Name: Alejandra Collado   YOB: 1960   Medical Record Number: [id number]   CSN: [id number]   Consulting Physician: Terri Groves MD  Referring Physician(s):  Myla Hart  Date of Visit: 11/06/2017     Reason dryness, menopausal        Past Surgical History:  Past Surgical History:  : EXAM OF VAGINA,COLPOSCOPY      Comment: positive HPV  :   2013: REMOVAL OF FALLOPIAN TUBE Bilateral      Comment: ovaries appeared normal    Family Medical History: by mouth. Disp:  Rfl:    ESTRACE 0.1 MG/GM Vaginal Cream  Disp:  Rfl:    ValACYclovir HCl 500 MG Oral Tab Take 1 tablet (500 mg total) by mouth 3 (three) times daily.  Disp: 90 tablet Rfl: 1   Valsartan-Hydrochlorothiazide 80-12.5 MG Oral Tab Take 0.5 table hepatosplenomegaly. No palpable mass. Extremities: No edema or calf tenderness. Neurological: Grossly intact.       Labs:      Lab Results  Component Value Date/Time   WBC 3.7 (L) 10/31/2017 07:37 AM   RBC 4.28 10/31/2017 07:37 AM   HGB 12.8 10/31/2017 0 HIV  --Patient is noted to have a normal vitamin B61, as well as folic acid levels, eats a full diet including meat  --Informed patient that based on the chronicity of her results, these findings are strongly suggestive for benign ethnic neutropenia which and the family. Helena Machado MD  [facility]  [address]

## 2017-11-17 ENCOUNTER — OFFICE VISIT (OUTPATIENT)
Dept: ENDOCRINOLOGY CLINIC | Facility: CLINIC | Age: 57
End: 2017-11-17

## 2017-11-17 VITALS
WEIGHT: 202 LBS | BODY MASS INDEX: 29.92 KG/M2 | SYSTOLIC BLOOD PRESSURE: 124 MMHG | HEIGHT: 69 IN | DIASTOLIC BLOOD PRESSURE: 78 MMHG | HEART RATE: 62 BPM

## 2017-11-17 DIAGNOSIS — E03.9 HYPOTHYROIDISM, UNSPECIFIED TYPE: Primary | ICD-10-CM

## 2017-11-17 PROCEDURE — 99212 OFFICE O/P EST SF 10 MIN: CPT | Performed by: INTERNAL MEDICINE

## 2017-11-17 PROCEDURE — 99243 OFF/OP CNSLTJ NEW/EST LOW 30: CPT | Performed by: INTERNAL MEDICINE

## 2017-11-17 RX ORDER — LEVOTHYROXINE SODIUM 0.07 MG/1
75 TABLET ORAL
Qty: 90 TABLET | Refills: 1 | Status: SHIPPED | OUTPATIENT
Start: 2017-11-17 | End: 2018-08-06

## 2017-11-17 NOTE — H&P
New Patient Evaluation - History and Physical    CONSULT - Reason for Visit: Hypothyroidism  Requesting Physician: Renee Saucedo MD    CHIEF COMPLAINT:    Hypothyroidism     HISTORY OF PRESENT ILLNESS:   Elly Vaz is a 64year old female who presents wit SOCIAL HISTORY:    Social History    Marital status: Single              Spouse name:                       Years of education:                 Number of children:               Social History Main Topics    Smoking status: Never Smoker anxiety  Hematology/Lymphatics: Negative for: bruising, lower extremity edema  Endocrine: Negative for: polyuria, polydypsia  Skin: + dry skin      PHYSICAL EXAM:    11/17/17  1520   BP: 124/78   BP Location: Right arm   Patient Position: Sitting   Cuff Si

## 2018-01-09 ENCOUNTER — TELEPHONE (OUTPATIENT)
Dept: OBGYN CLINIC | Facility: CLINIC | Age: 58
End: 2018-01-09

## 2018-01-09 ENCOUNTER — TELEPHONE (OUTPATIENT)
Dept: OTHER | Age: 58
End: 2018-01-09

## 2018-01-09 NOTE — TELEPHONE ENCOUNTER
Pt was referred to [facility] for spotting,states that she started to spot yesterday. Referral in the system. Next available appt for np would be 2/9. Please adv.

## 2018-01-09 NOTE — TELEPHONE ENCOUNTER
Please assist pt with scheduling sooner appt with [facility] or any other provider if she prefers. OK to use RN approval. Thanks.

## 2018-01-09 NOTE — TELEPHONE ENCOUNTER
Pt states she started having spotting from vagina yesterday, very light spotting today. Pt describes spotting as fuscia color. Pt is wearing a panty liner today, has not needed to change it yet today.  Pt states she had a mild cramp yesterday, on the left l

## 2018-01-10 NOTE — TELEPHONE ENCOUNTER
She will need to see gynecology–either Dr. Barrera Stands.   The other group could be–Dr. Blaze Mitchell. sahara/Dr. Saenz/Dr. Wallace/Dr. Andrew Guzman

## 2018-01-16 ENCOUNTER — TELEPHONE (OUTPATIENT)
Dept: GASTROENTEROLOGY | Facility: CLINIC | Age: 58
End: 2018-01-16

## 2018-01-16 NOTE — TELEPHONE ENCOUNTER
I received a call from [facility] at the [facility] stating that this patient may not be covered under her insurance since the procedure may not be covered under the policy she signed up for under [facility].  [facility] stated also that she may have changed at the beginning o

## 2018-01-16 NOTE — TELEPHONE ENCOUNTER
I spoke with this patient to inform her that her insurance was not effective but she stated she spoke with Greg Payne at [facility] to update her insurance to [facility] and she is all set for the Friday procedure.  I also asked her to please contact [facility] to update her

## 2018-01-17 ENCOUNTER — OFFICE VISIT (OUTPATIENT)
Dept: OPHTHALMOLOGY | Facility: CLINIC | Age: 58
End: 2018-01-17

## 2018-01-17 DIAGNOSIS — Z83.511 FAMILY HISTORY OF GLAUCOMA IN MOTHER: ICD-10-CM

## 2018-01-17 DIAGNOSIS — Z83.511 FAMILY HISTORY OF GLAUCOMA IN SISTER: ICD-10-CM

## 2018-01-17 DIAGNOSIS — H40.003 GLAUCOMA SUSPECT OF BOTH EYES: Primary | ICD-10-CM

## 2018-01-17 PROCEDURE — 99212 OFFICE O/P EST SF 10 MIN: CPT | Performed by: OPHTHALMOLOGY

## 2018-01-17 PROCEDURE — 99213 OFFICE O/P EST LOW 20 MIN: CPT | Performed by: OPHTHALMOLOGY

## 2018-01-17 RX ORDER — LATANOPROST 50 UG/ML
SOLUTION/ DROPS OPHTHALMIC
Qty: 3 BOTTLE | Refills: 3 | Status: SHIPPED | OUTPATIENT
Start: 2018-01-17

## 2018-01-17 NOTE — ASSESSMENT & PLAN NOTE
IOP is slightly elevated in both eyes. Since patient is very concerned with her elevated pressure and very strong family history of glaucoma, will resume Latanoprost at bedtime in both eyes.     Will have patient back in 5 months for a visual field, OCT a

## 2018-01-17 NOTE — PATIENT INSTRUCTIONS
Family history of glaucoma in mother  Will follow closely. Mother went blind from glaucoma. Family history of glaucoma in sister  Will follow closely. Glaucoma suspect of both eyes  IOP is slightly elevated in both eyes.    Since patient is very c

## 2018-02-19 ENCOUNTER — APPOINTMENT (OUTPATIENT)
Dept: LAB | Facility: HOSPITAL | Age: 58
End: 2018-02-19
Attending: PHYSICIAN ASSISTANT
Payer: COMMERCIAL

## 2018-02-19 ENCOUNTER — TELEPHONE (OUTPATIENT)
Dept: INTERNAL MEDICINE CLINIC | Facility: CLINIC | Age: 58
End: 2018-02-19

## 2018-02-19 ENCOUNTER — TELEPHONE (OUTPATIENT)
Dept: OTHER | Age: 58
End: 2018-02-19

## 2018-02-19 ENCOUNTER — OFFICE VISIT (OUTPATIENT)
Dept: INTERNAL MEDICINE CLINIC | Facility: CLINIC | Age: 58
End: 2018-02-19

## 2018-02-19 VITALS
RESPIRATION RATE: 16 BRPM | DIASTOLIC BLOOD PRESSURE: 70 MMHG | BODY MASS INDEX: 29 KG/M2 | WEIGHT: 193.38 LBS | HEART RATE: 60 BPM | SYSTOLIC BLOOD PRESSURE: 118 MMHG

## 2018-02-19 DIAGNOSIS — E03.9 HYPOTHYROIDISM, UNSPECIFIED TYPE: ICD-10-CM

## 2018-02-19 DIAGNOSIS — R94.31 ABNORMAL EKG: ICD-10-CM

## 2018-02-19 DIAGNOSIS — R94.31 ABNORMAL EKG: Primary | ICD-10-CM

## 2018-02-19 LAB — TSH SERPL-ACNC: 0.05 UIU/ML (ref 0.45–5.33)

## 2018-02-19 PROCEDURE — 93010 ELECTROCARDIOGRAM REPORT: CPT | Performed by: PHYSICIAN ASSISTANT

## 2018-02-19 PROCEDURE — 93005 ELECTROCARDIOGRAM TRACING: CPT

## 2018-02-19 PROCEDURE — 84443 ASSAY THYROID STIM HORMONE: CPT

## 2018-02-19 PROCEDURE — 99213 OFFICE O/P EST LOW 20 MIN: CPT | Performed by: PHYSICIAN ASSISTANT

## 2018-02-19 PROCEDURE — 36415 COLL VENOUS BLD VENIPUNCTURE: CPT

## 2018-02-19 RX ORDER — VALSARTAN AND HYDROCHLOROTHIAZIDE 80; 12.5 MG/1; MG/1
0.5 TABLET, FILM COATED ORAL DAILY
Qty: 90 TABLET | Refills: 1 | Status: SHIPPED | OUTPATIENT
Start: 2018-02-19 | End: 2018-07-30 | Stop reason: ALTCHOICE

## 2018-02-19 NOTE — TELEPHONE ENCOUNTER
Pt states she was seen in the office today, had some lab work done. Pt states her gynecologist just informed her that she has a UTI, had a urine test done on Friday. Pt is asking if she should be seen again today.  Gyne office tried faxing report, pt states

## 2018-02-19 NOTE — TELEPHONE ENCOUNTER
Pt notified. She will inform clinic if she develops any symptoms. Symptoms of UTI reviewed with pt. Verbalized understanding and denied questions.

## 2018-02-19 NOTE — PROGRESS NOTES
Danay Huang is a 62year old female. Patient presents with:  Abnormal EKG  Thyroid Problem      HPI:   Patient presents today for follow up of abnormal EKG. States she underwent pre-op testing at [facility] for upcoming D&C and EKG showed abnormalities.  She 6/20/17 1st visit- was on Cosopt bid OU and Latanoprost qhs OU- 6/17/17 normal OCT and VF, so D/C Cosopt, but continue Latanoprost qhs OU, 9/16/17- stable IOP, so D/C Latanoprost qhs OU, 1/17/18 RESUME LATANOPROST QHS OU DUE TO IOP OF 22/26 AND PT CONCERN her pre-op testing for upcoming D&C. No reports available to review. Previous EKG done here a year ago with sinus bradycardia. Physical exam is unremarkable. Will check EKG and contact patient with results. -     EKG 12-LEAD;  Future    Hypothyroidism, un

## 2018-02-19 NOTE — TELEPHONE ENCOUNTER
Patient does not need to be seen again. We typically do not treat patients with UTI if they have no risk factors or symptoms. Advise to drink plenty of fluids and follow up in the clinic or with her gynecologist if symptoms develop.

## 2018-02-20 ENCOUNTER — APPOINTMENT (OUTPATIENT)
Dept: HEMATOLOGY/ONCOLOGY | Facility: HOSPITAL | Age: 58
End: 2018-02-20
Attending: INTERNAL MEDICINE

## 2018-02-20 DIAGNOSIS — I51.7 LEFT ATRIAL ENLARGEMENT: Primary | ICD-10-CM

## 2018-02-21 ENCOUNTER — OFFICE VISIT (OUTPATIENT)
Dept: INTERNAL MEDICINE CLINIC | Facility: CLINIC | Age: 58
End: 2018-02-21

## 2018-02-21 VITALS
DIASTOLIC BLOOD PRESSURE: 79 MMHG | WEIGHT: 192 LBS | HEART RATE: 67 BPM | TEMPERATURE: 98 F | HEIGHT: 69 IN | SYSTOLIC BLOOD PRESSURE: 118 MMHG | BODY MASS INDEX: 28.44 KG/M2

## 2018-02-21 DIAGNOSIS — R82.90 URINE ABNORMALITY: Primary | ICD-10-CM

## 2018-02-21 PROCEDURE — 99212 OFFICE O/P EST SF 10 MIN: CPT | Performed by: PHYSICIAN ASSISTANT

## 2018-02-21 NOTE — PROGRESS NOTES
HPI:    Patient ID: Kristine Martinez is a 62year old female. HPI   Patient here for follow up of abnormal urine test results. States she recently had labs and urine completed at [facility] and was told she has UTI.  She is currently asymptomatic and denies dys Cardiovascular: Normal rate, regular rhythm and normal heart sounds. Pulmonary/Chest: Effort normal and breath sounds normal.   Abdominal: Soft. Bowel sounds are normal. She exhibits no distension. There is no tenderness.  There is no rebound and no gu

## 2018-02-26 ENCOUNTER — HOSPITAL ENCOUNTER (OUTPATIENT)
Dept: CV DIAGNOSTICS | Facility: HOSPITAL | Age: 58
Discharge: HOME | End: 2018-02-26
Attending: PHYSICIAN ASSISTANT
Payer: COMMERCIAL

## 2018-02-26 DIAGNOSIS — I51.7 LEFT ATRIAL ENLARGEMENT: ICD-10-CM

## 2018-02-26 PROCEDURE — 93306 TTE W/DOPPLER COMPLETE: CPT | Performed by: PHYSICIAN ASSISTANT

## 2018-02-28 ENCOUNTER — APPOINTMENT (OUTPATIENT)
Dept: HEMATOLOGY/ONCOLOGY | Facility: HOSPITAL | Age: 58
End: 2018-02-28
Attending: INTERNAL MEDICINE

## 2018-04-10 ENCOUNTER — TELEPHONE (OUTPATIENT)
Dept: INTERNAL MEDICINE CLINIC | Facility: CLINIC | Age: 58
End: 2018-04-10

## 2018-04-10 NOTE — TELEPHONE ENCOUNTER
Pharmacy called to clarify the 15-day order as the patient takes 1/2 pill daily.  Given potential cost if insurance does not cover, advised on 2 wk supply with 8 tablets; pharmacist estimated out of pocket cost to patient around $62.

## 2018-04-10 NOTE — TELEPHONE ENCOUNTER
Current Outpatient Prescriptions:  Valsartan-Hydrochlorothiazide 80-12.5 MG Oral Tab Take 0.5 tablets by mouth daily.  Patient takes half daily Disp: 90 tablet Rfl: 1     Pt requesting 15 day supply to local [facility]-will be out of town before mail order suppl

## 2018-04-11 ENCOUNTER — TELEPHONE (OUTPATIENT)
Dept: INTERNAL MEDICINE CLINIC | Facility: CLINIC | Age: 58
End: 2018-04-11

## 2018-04-11 DIAGNOSIS — R92.2 DENSE BREAST TISSUE ON MAMMOGRAM: ICD-10-CM

## 2018-04-11 DIAGNOSIS — Z12.31 VISIT FOR SCREENING MAMMOGRAM: Primary | ICD-10-CM

## 2018-04-21 NOTE — TELEPHONE ENCOUNTER
She sees multiple mds at multiple locations. Last mammograms have been per dr emanuel.   She will christiana to follow up with the same md-as that was at [facility]-her gyne md-to do here she will need to have images from all old mammograms transferred here on a dis

## 2018-05-07 ENCOUNTER — APPOINTMENT (OUTPATIENT)
Dept: HEMATOLOGY/ONCOLOGY | Facility: HOSPITAL | Age: 58
End: 2018-05-07
Attending: INTERNAL MEDICINE

## 2018-05-21 ENCOUNTER — OFFICE VISIT (OUTPATIENT)
Dept: INTERNAL MEDICINE CLINIC | Facility: CLINIC | Age: 58
End: 2018-05-21

## 2018-05-21 VITALS
TEMPERATURE: 99 F | HEIGHT: 69 IN | HEART RATE: 81 BPM | BODY MASS INDEX: 29.18 KG/M2 | RESPIRATION RATE: 18 BRPM | DIASTOLIC BLOOD PRESSURE: 70 MMHG | WEIGHT: 197 LBS | SYSTOLIC BLOOD PRESSURE: 120 MMHG

## 2018-05-21 DIAGNOSIS — H93.8X3 EAR PRESSURE, BILATERAL: ICD-10-CM

## 2018-05-21 DIAGNOSIS — I10 HTN (HYPERTENSION), BENIGN: ICD-10-CM

## 2018-05-21 DIAGNOSIS — E03.9 HYPOTHYROIDISM, UNSPECIFIED TYPE: Primary | ICD-10-CM

## 2018-05-21 PROCEDURE — 99213 OFFICE O/P EST LOW 20 MIN: CPT | Performed by: PHYSICIAN ASSISTANT

## 2018-05-21 RX ORDER — VALSARTAN AND HYDROCHLOROTHIAZIDE 80; 12.5 MG/1; MG/1
0.5 TABLET, FILM COATED ORAL DAILY
Qty: 90 TABLET | Refills: 2 | Status: CANCELLED | OUTPATIENT
Start: 2018-05-21

## 2018-05-21 RX ORDER — VALSARTAN 80 MG/1
80 TABLET ORAL DAILY
Qty: 30 TABLET | Refills: 5 | Status: SHIPPED | OUTPATIENT
Start: 2018-05-21 | End: 2018-07-30 | Stop reason: ALTCHOICE

## 2018-05-21 NOTE — PROGRESS NOTES
HPI:    Patient ID: Maurisio Shah is a 62year old female. HPI  Patient presents today complaining of bilateral ear pressure for the last 3 days. Symptoms are worse on the left side. Also has a sensation of dryness and itching.  Has been using debrox ea total) by mouth daily. Disp: 30 tablet Rfl: 5   Valsartan-Hydrochlorothiazide 80-12.5 MG Oral Tab Take 0.5 tablets by mouth daily.  Patient takes half daily Disp: 90 tablet Rfl: 1   latanoprost 0.005 % Ophthalmic Solution Instill 1 drop in both eyes at bedt valsartan–hydrochlorothiazide 80–12.5 mg half tablet once daily. Blood pressure seemed to be well controlled despite decreasing the dose. She would like to be off as many medications as possible.   After discussion we will change medication to valsartan 8

## 2018-06-01 NOTE — TELEPHONE ENCOUNTER
Pt calling back, states she is very upset about how the last triage nurse spoke to her. States she just wants her mammogram order. Pt states she would actually prefer the request to go to Jae BAUMANN instead. Routing to Jae Ramirez.

## 2018-06-01 NOTE — TELEPHONE ENCOUNTER
Pt finally returning call; informed of RADHA's response below. Pt voices understanding but is asking that RADHA generate the mammogram order as states her previous mammograms were actually done in [place].  Pt states she will work on getting old images to [place]

## 2018-06-01 NOTE — TELEPHONE ENCOUNTER
In \"care everywhere\" tab, patient had a   BREAST MAMMO SCREENING MARGOTH 2D/3D-BI-ICN:WITH CAD in 2017 at [facility]. Order pended, pls check for accuracy and approve if in agreement.

## 2018-06-02 NOTE — TELEPHONE ENCOUNTER
Spoke with pt and Dr Leon Hall message relayed. Pt verb understanding. Pt given [facility] phone number to schedule.

## 2018-06-02 NOTE — TELEPHONE ENCOUNTER
Reviewed mammogram from [facility]–read as dense breasts–category C and will need follow-up tumor synthesis mammograms which has been ordered.

## 2018-06-26 ENCOUNTER — APPOINTMENT (OUTPATIENT)
Dept: LAB | Facility: HOSPITAL | Age: 58
End: 2018-06-26
Attending: INTERNAL MEDICINE
Payer: COMMERCIAL

## 2018-06-26 ENCOUNTER — HOSPITAL ENCOUNTER (OUTPATIENT)
Dept: MAMMOGRAPHY | Facility: HOSPITAL | Age: 58
Discharge: HOME | End: 2018-06-26
Attending: INTERNAL MEDICINE
Payer: COMMERCIAL

## 2018-06-26 DIAGNOSIS — R92.2 DENSE BREAST TISSUE ON MAMMOGRAM: ICD-10-CM

## 2018-06-26 DIAGNOSIS — E03.9 HYPOTHYROIDISM, UNSPECIFIED TYPE: ICD-10-CM

## 2018-06-26 DIAGNOSIS — Z12.31 VISIT FOR SCREENING MAMMOGRAM: ICD-10-CM

## 2018-06-26 PROCEDURE — 77067 SCR MAMMO BI INCL CAD: CPT | Performed by: INTERNAL MEDICINE

## 2018-06-26 PROCEDURE — 77063 BREAST TOMOSYNTHESIS BI: CPT | Performed by: INTERNAL MEDICINE

## 2018-06-26 PROCEDURE — 36415 COLL VENOUS BLD VENIPUNCTURE: CPT

## 2018-06-26 PROCEDURE — 84443 ASSAY THYROID STIM HORMONE: CPT

## 2018-07-30 ENCOUNTER — OFFICE VISIT (OUTPATIENT)
Dept: INTERNAL MEDICINE CLINIC | Facility: CLINIC | Age: 58
End: 2018-07-30
Payer: COMMERCIAL

## 2018-07-30 VITALS
HEIGHT: 69 IN | HEART RATE: 65 BPM | BODY MASS INDEX: 27.74 KG/M2 | WEIGHT: 187.31 LBS | SYSTOLIC BLOOD PRESSURE: 148 MMHG | DIASTOLIC BLOOD PRESSURE: 91 MMHG

## 2018-07-30 DIAGNOSIS — E03.9 ACQUIRED HYPOTHYROIDISM: ICD-10-CM

## 2018-07-30 DIAGNOSIS — B00.9 HERPES SIMPLEX: ICD-10-CM

## 2018-07-30 DIAGNOSIS — Z00.00 ROUTINE PHYSICAL EXAMINATION: Primary | ICD-10-CM

## 2018-07-30 DIAGNOSIS — D70.8 OTHER NEUTROPENIA (HCC): ICD-10-CM

## 2018-07-30 DIAGNOSIS — H40.003 GLAUCOMA SUSPECT OF BOTH EYES: ICD-10-CM

## 2018-07-30 DIAGNOSIS — I10 HTN (HYPERTENSION), BENIGN: ICD-10-CM

## 2018-07-30 PROCEDURE — 99396 PREV VISIT EST AGE 40-64: CPT | Performed by: PHYSICIAN ASSISTANT

## 2018-07-30 RX ORDER — LOSARTAN POTASSIUM 50 MG/1
50 TABLET ORAL DAILY
Qty: 30 TABLET | Refills: 5 | Status: SHIPPED | OUTPATIENT
Start: 2018-07-30 | End: 2018-08-13

## 2018-07-30 NOTE — PROGRESS NOTES
HPI:    Patient ID: Brooklyn Arce is a 62year old female. HPI   Patient with history of hypertension, hypothyroidism, glaucoma, HPV, and HSV presents requesting physical exam.  Her last physical was 10/11/2017.   Since then she has been following up wi Respiratory: Negative for cough, shortness of breath and wheezing. Cardiovascular: Negative for chest pain, palpitations and leg swelling. Gastrointestinal: Negative for nausea, vomiting, abdominal pain and diarrhea.    Genitourinary: Negative for dy no wheezes. She has no rales. Breast exam deferred   Abdominal: Soft. Bowel sounds are normal. She exhibits no distension and no mass. There is no tenderness.    Genitourinary:   Genitourinary Comments: Pelvic exam and Pap deferred   Musculoskeletal: Norm monitor.         Orders Placed This Encounter      CBC W Differential W Platelet [E]      Comp Metabolic Panel (14) [E]      Lipid Panel [E]    Meds This Visit:  Signed Prescriptions Disp Refills    Losartan Potassium 50 MG Oral Tab 30 tablet 5      Sig: Ta

## 2018-08-06 RX ORDER — LEVOTHYROXINE SODIUM 0.07 MG/1
75 TABLET ORAL
Qty: 90 TABLET | Refills: 0 | Status: SHIPPED
Start: 2018-08-06 | End: 2018-09-06 | Stop reason: CLARIF

## 2018-08-06 RX ORDER — LEVOTHYROXINE SODIUM 0.07 MG/1
75 TABLET ORAL
Qty: 90 TABLET | Refills: 1
Start: 2018-08-06

## 2018-08-06 NOTE — TELEPHONE ENCOUNTER
From: Subha Vasquez  Sent: 8/6/2018 11:09 AM CDT  Subject: Medication Renewal Request    Subha Vasquez would like a refill of the following medications:     Levothyroxine Sodium 75 MCG Oral Tab Kayla Starks MD]   Patient Comment: I am on vacation i

## 2018-08-13 ENCOUNTER — TELEPHONE (OUTPATIENT)
Dept: INTERNAL MEDICINE CLINIC | Facility: CLINIC | Age: 58
End: 2018-08-13

## 2018-08-13 ENCOUNTER — PATIENT MESSAGE (OUTPATIENT)
Dept: INTERNAL MEDICINE CLINIC | Facility: CLINIC | Age: 58
End: 2018-08-13

## 2018-08-13 RX ORDER — LOSARTAN POTASSIUM 50 MG/1
50 TABLET ORAL DAILY
Qty: 90 TABLET | Refills: 0 | Status: SHIPPED | OUTPATIENT
Start: 2018-08-13 | End: 2018-11-27

## 2018-08-13 NOTE — TELEPHONE ENCOUNTER
Hypertensive Medications Protocol Passed8/13 11:07 AM   CMP or BMP in past 12 months    Serum Creatinine < 2.0    Appointment in past 6 or next 3 months     Medication refilled for 90 days per protocol.

## 2018-08-13 NOTE — TELEPHONE ENCOUNTER
From: Subha Vasquez  Sent: 8/13/2018 10:55 AM CDT  Subject: Medication Renewal Request    Subha Vasquez would like a refill of the following medications:     Losartan Potassium 50 MG Oral Tab Pedro Chen PA-C]   Patient Comment: 90 day mail order ne

## 2018-08-27 ENCOUNTER — TELEPHONE (OUTPATIENT)
Dept: ENDOCRINOLOGY CLINIC | Facility: CLINIC | Age: 58
End: 2018-08-27

## 2018-08-27 DIAGNOSIS — E03.9 HYPOTHYROIDISM, UNSPECIFIED TYPE: Primary | ICD-10-CM

## 2018-08-27 RX ORDER — LEVOTHYROXINE SODIUM 75 MCG
TABLET ORAL
Qty: 90 TABLET | Refills: 0 | Status: SHIPPED | OUTPATIENT
Start: 2018-08-27 | End: 2018-08-27

## 2018-08-27 RX ORDER — LEVOTHYROXINE SODIUM 0.07 MG/1
TABLET ORAL
Qty: 90 TABLET | Refills: 0 | Status: SHIPPED | OUTPATIENT
Start: 2018-08-27 | End: 2018-09-06 | Stop reason: CLARIF

## 2018-08-27 RX ORDER — LEVOTHYROXINE SODIUM 0.07 MG/1
TABLET ORAL
Qty: 90 TABLET | Refills: 0 | OUTPATIENT
Start: 2018-08-27 | End: 2018-08-27

## 2018-08-27 RX ORDER — LEVOTHYROXINE SODIUM 0.07 MG/1
TABLET ORAL
Qty: 90 TABLET | Refills: 0 | OUTPATIENT
Start: 2018-08-27 | End: 2018-10-15

## 2018-08-27 NOTE — TELEPHONE ENCOUNTER
Okay to refill ( I tried signing but got error message, may be call in)  Also needs fu.  TSh and Free T4 before apt

## 2018-08-27 NOTE — TELEPHONE ENCOUNTER
Called in prescription to [facility] as written. Emailed patient indicating she is due for labs and a follow up.

## 2018-09-05 ENCOUNTER — LAB ENCOUNTER (OUTPATIENT)
Dept: LAB | Facility: HOSPITAL | Age: 58
End: 2018-09-05
Attending: PHYSICIAN ASSISTANT
Payer: COMMERCIAL

## 2018-09-05 DIAGNOSIS — E03.9 HYPOTHYROIDISM, UNSPECIFIED TYPE: ICD-10-CM

## 2018-09-05 DIAGNOSIS — Z00.00 ROUTINE PHYSICAL EXAMINATION: ICD-10-CM

## 2018-09-05 LAB
ALBUMIN SERPL BCP-MCNC: 3.7 G/DL (ref 3.5–4.8)
ALBUMIN/GLOB SERPL: 1.3 {RATIO} (ref 1–2)
ALP SERPL-CCNC: 49 U/L (ref 32–100)
ALT SERPL-CCNC: 18 U/L (ref 14–54)
ANION GAP SERPL CALC-SCNC: 5 MMOL/L (ref 0–18)
AST SERPL-CCNC: 21 U/L (ref 15–41)
BASOPHILS # BLD: 0.1 K/UL (ref 0–0.2)
BASOPHILS NFR BLD: 2 %
BILIRUB SERPL-MCNC: 0.6 MG/DL (ref 0.3–1.2)
BUN SERPL-MCNC: 7 MG/DL (ref 8–20)
BUN/CREAT SERPL: 8.5 (ref 10–20)
CALCIUM SERPL-MCNC: 8.8 MG/DL (ref 8.5–10.5)
CHLORIDE SERPL-SCNC: 109 MMOL/L (ref 95–110)
CHOLEST SERPL-MCNC: 123 MG/DL (ref 110–200)
CO2 SERPL-SCNC: 26 MMOL/L (ref 22–32)
CREAT SERPL-MCNC: 0.82 MG/DL (ref 0.5–1.5)
EOSINOPHIL # BLD: 0.1 K/UL (ref 0–0.7)
EOSINOPHIL NFR BLD: 3 %
ERYTHROCYTE [DISTWIDTH] IN BLOOD BY AUTOMATED COUNT: 14.4 % (ref 11–15)
GLOBULIN PLAS-MCNC: 2.9 G/DL (ref 2.5–3.7)
GLUCOSE SERPL-MCNC: 87 MG/DL (ref 70–99)
HCT VFR BLD AUTO: 41.3 % (ref 35–48)
HDLC SERPL-MCNC: 58 MG/DL
HGB BLD-MCNC: 13.5 G/DL (ref 12–16)
LDLC SERPL CALC-MCNC: 54 MG/DL (ref 0–99)
LYMPHOCYTES # BLD: 1.4 K/UL (ref 1–4)
LYMPHOCYTES NFR BLD: 44 %
MCH RBC QN AUTO: 29.8 PG (ref 27–32)
MCHC RBC AUTO-ENTMCNC: 32.7 G/DL (ref 32–37)
MCV RBC AUTO: 91.3 FL (ref 80–100)
MONOCYTES # BLD: 0.2 K/UL (ref 0–1)
MONOCYTES NFR BLD: 6 %
NEUTROPHILS # BLD AUTO: 1.4 K/UL (ref 1.8–7.7)
NEUTROPHILS NFR BLD: 45 %
NONHDLC SERPL-MCNC: 65 MG/DL
OSMOLALITY UR CALC.SUM OF ELEC: 287 MOSM/KG (ref 275–295)
PATIENT FASTING: YES
PLATELET # BLD AUTO: 211 K/UL (ref 140–400)
PMV BLD AUTO: 9.7 FL (ref 7.4–10.3)
POTASSIUM SERPL-SCNC: 3.9 MMOL/L (ref 3.3–5.1)
PROT SERPL-MCNC: 6.6 G/DL (ref 5.9–8.4)
RBC # BLD AUTO: 4.53 M/UL (ref 3.7–5.4)
SODIUM SERPL-SCNC: 140 MMOL/L (ref 136–144)
T4 FREE SERPL-MCNC: 0.82 NG/DL (ref 0.58–1.64)
TRIGL SERPL-MCNC: 55 MG/DL (ref 1–149)
TSH SERPL-ACNC: 3.5 UIU/ML (ref 0.45–5.33)
WBC # BLD AUTO: 3.2 K/UL (ref 4–11)

## 2018-09-05 PROCEDURE — 84439 ASSAY OF FREE THYROXINE: CPT

## 2018-09-05 PROCEDURE — 80053 COMPREHEN METABOLIC PANEL: CPT

## 2018-09-05 PROCEDURE — 84443 ASSAY THYROID STIM HORMONE: CPT

## 2018-09-05 PROCEDURE — 36415 COLL VENOUS BLD VENIPUNCTURE: CPT

## 2018-09-05 PROCEDURE — 85025 COMPLETE CBC W/AUTO DIFF WBC: CPT

## 2018-09-05 PROCEDURE — 80061 LIPID PANEL: CPT

## 2018-09-06 ENCOUNTER — OFFICE VISIT (OUTPATIENT)
Dept: INTERNAL MEDICINE CLINIC | Facility: CLINIC | Age: 58
End: 2018-09-06
Payer: COMMERCIAL

## 2018-09-06 VITALS
WEIGHT: 193.13 LBS | HEIGHT: 69 IN | BODY MASS INDEX: 28.6 KG/M2 | HEART RATE: 65 BPM | DIASTOLIC BLOOD PRESSURE: 68 MMHG | SYSTOLIC BLOOD PRESSURE: 122 MMHG

## 2018-09-06 DIAGNOSIS — I10 HTN (HYPERTENSION), BENIGN: Primary | ICD-10-CM

## 2018-09-06 PROCEDURE — 99212 OFFICE O/P EST SF 10 MIN: CPT | Performed by: PHYSICIAN ASSISTANT

## 2018-09-06 RX ORDER — AMILORIDE HCL 5 MG
10 TABLET ORAL EVERY 4 HOURS PRN
COMMUNITY
End: 2019-06-07

## 2018-09-06 NOTE — PROGRESS NOTES
HPI:    Patient ID: Patricia Fay is a 62year old female. HPI   Patient presents today for follow-up of hypertension.   She was last seen in the office on 7/30/18 for physical exam.  At that time we officially discontinued valsartan 80 mg and started l clear and moist.   Eyes: Conjunctivae are normal. Pupils are equal, round, and reactive to light. Neck: Normal range of motion. Neck supple. Cardiovascular: Normal rate, regular rhythm, normal heart sounds and intact distal pulses.      Edema not presen

## 2018-10-02 ENCOUNTER — TELEPHONE (OUTPATIENT)
Dept: OBGYN CLINIC | Facility: CLINIC | Age: 58
End: 2018-10-02

## 2018-10-02 ENCOUNTER — OFFICE VISIT (OUTPATIENT)
Dept: OBGYN CLINIC | Facility: CLINIC | Age: 58
End: 2018-10-02
Payer: COMMERCIAL

## 2018-10-02 VITALS
WEIGHT: 194 LBS | BODY MASS INDEX: 29 KG/M2 | DIASTOLIC BLOOD PRESSURE: 80 MMHG | HEART RATE: 65 BPM | SYSTOLIC BLOOD PRESSURE: 115 MMHG

## 2018-10-02 DIAGNOSIS — L29.2 VULVAR ITCHING: Primary | ICD-10-CM

## 2018-10-02 PROCEDURE — 99203 OFFICE O/P NEW LOW 30 MIN: CPT | Performed by: OBSTETRICS & GYNECOLOGY

## 2018-10-02 RX ORDER — VALACYCLOVIR HYDROCHLORIDE 500 MG/1
500 TABLET, FILM COATED ORAL 2 TIMES DAILY
Qty: 60 TABLET | Refills: 0 | Status: SHIPPED | OUTPATIENT
Start: 2018-10-02 | End: 2018-10-02

## 2018-10-02 RX ORDER — VALACYCLOVIR HYDROCHLORIDE 500 MG/1
500 TABLET, FILM COATED ORAL 2 TIMES DAILY
Qty: 60 TABLET | Refills: 0 | Status: SHIPPED | OUTPATIENT
Start: 2018-10-02 | End: 2018-10-15

## 2018-10-02 NOTE — H&P
HPI:  The patient is a 61 yo F c/o vulvar itching x 1 week. Did change soaps recently. H/o herpes. Itching near perineum.       Reviewed medical and surgical history below       OBSTETRICS HISTORY:  Obstetric History     T1    L1    SAB0  TAB black tea, 1 cup and expresso beans        Exercise: Yes          exercises daily        Seat Belt: Not Asked        Special Diet: Not Asked        Stress Concern: Not Asked        Weight Concern: Not Asked    Social History Narrative      Gage Marcus is divor Systems:  Constitutional:  Denies fevers and chills   Cardiovascular:  denies chest pain or palpitations  Respiratory:  denies shortness of breath  Gastrointestinal:  denies heartburn, abdominal pain, diarrhea or constipation  Genitourinary:  denies dysuri

## 2018-10-02 NOTE — TELEPHONE ENCOUNTER
Current Outpatient Medications:  ValACYclovir HCl (VALTREX) 500 MG Oral Tab Take 1 tablet (500 mg total) by mouth 2 (two) times daily.  Disp: 60 tablet Rfl: 0                                                   Need it sent to [facility] order ,

## 2018-10-04 ENCOUNTER — TELEPHONE (OUTPATIENT)
Dept: OBGYN CLINIC | Facility: CLINIC | Age: 58
End: 2018-10-04

## 2018-10-04 NOTE — TELEPHONE ENCOUNTER
Pt. Is RT RNs call. And she states that she gives the RN consent to leave vm of her results, if she is not able to answer the call.

## 2018-10-08 ENCOUNTER — OFFICE VISIT (OUTPATIENT)
Dept: ENDOCRINOLOGY CLINIC | Facility: CLINIC | Age: 58
End: 2018-10-08
Payer: COMMERCIAL

## 2018-10-08 VITALS
BODY MASS INDEX: 29 KG/M2 | SYSTOLIC BLOOD PRESSURE: 132 MMHG | HEART RATE: 74 BPM | TEMPERATURE: 98 F | WEIGHT: 193 LBS | DIASTOLIC BLOOD PRESSURE: 86 MMHG

## 2018-10-08 DIAGNOSIS — E03.9 HYPOTHYROIDISM, UNSPECIFIED TYPE: Primary | ICD-10-CM

## 2018-10-08 PROCEDURE — 99212 OFFICE O/P EST SF 10 MIN: CPT | Performed by: INTERNAL MEDICINE

## 2018-10-08 PROCEDURE — 99213 OFFICE O/P EST LOW 20 MIN: CPT | Performed by: INTERNAL MEDICINE

## 2018-10-08 RX ORDER — LEVOTHYROXINE SODIUM 88 UG/1
88 TABLET ORAL
Qty: 90 TABLET | Refills: 0 | Status: SHIPPED | OUTPATIENT
Start: 2018-10-08 | End: 2018-12-05

## 2018-10-15 ENCOUNTER — OFFICE VISIT (OUTPATIENT)
Dept: OBGYN CLINIC | Facility: CLINIC | Age: 58
End: 2018-10-15
Payer: COMMERCIAL

## 2018-10-15 VITALS
SYSTOLIC BLOOD PRESSURE: 144 MMHG | HEART RATE: 63 BPM | HEIGHT: 72 IN | BODY MASS INDEX: 26.36 KG/M2 | WEIGHT: 194.63 LBS | DIASTOLIC BLOOD PRESSURE: 88 MMHG

## 2018-10-15 DIAGNOSIS — Z12.4 PAPANICOLAOU SMEAR FOR CERVICAL CANCER SCREENING: ICD-10-CM

## 2018-10-15 DIAGNOSIS — Z01.419 ENCOUNTER FOR GYNECOLOGICAL EXAMINATION WITHOUT ABNORMAL FINDING: Primary | ICD-10-CM

## 2018-10-15 PROCEDURE — 99396 PREV VISIT EST AGE 40-64: CPT | Performed by: OBSTETRICS & GYNECOLOGY

## 2018-10-15 NOTE — H&P
HPI:  The patient is a 63 yo F here for WWE. NO GYN c/o. Seen 2 weeks ago with vulvar itching 2/2 herpes flair. Resolved. NO PMB. NO IC. Unknown LPS.   Mammo/earline: 6/26/18- neg    Reviewed medical and surgical history below       OBSTETRICS HISTORY: Topics      Concerns:        Caffeine Concern: Yes          black tea, 1 cup and expresso beans        Exercise: Yes          exercises daily        Seat Belt: Not Asked        Special Diet: Not Asked        Stress Concern: Not Asked        Weight Concern: abnormal vaginal discharge, vaginal itching  Musculoskeletal:  denies back pain. Skin/Breast:  Denies any breast pain, lumps, or discharge. Neurological:  denies headaches, extremity weakness  Psychiatric: denies depression or anxiety.        10/15/18  0

## 2018-10-16 ENCOUNTER — OFFICE VISIT (OUTPATIENT)
Dept: PODIATRY CLINIC | Facility: CLINIC | Age: 58
End: 2018-10-16
Payer: COMMERCIAL

## 2018-10-16 DIAGNOSIS — M21.611 BUNION, RIGHT FOOT: Primary | ICD-10-CM

## 2018-10-16 PROCEDURE — 99203 OFFICE O/P NEW LOW 30 MIN: CPT | Performed by: PODIATRIST

## 2018-10-16 PROCEDURE — 99212 OFFICE O/P EST SF 10 MIN: CPT | Performed by: PODIATRIST

## 2018-10-16 NOTE — PROGRESS NOTES
HPI:    Patient ID: Subha Vasquez is a 62year old female. This 59-year-old female presents to me as a new patient states that she is self-referred. Her chief complaint was pain in the right first toe for 2-3 days. Today she has no pain.   She states sh placed in this encounter.       Meds This Visit:  Requested Prescriptions      No prescriptions requested or ordered in this encounter       Imaging & Referrals:  None       #[id number]

## 2018-11-27 RX ORDER — LEVOTHYROXINE SODIUM 0.07 MG/1
TABLET ORAL
Qty: 90 TABLET | Refills: 0 | OUTPATIENT
Start: 2018-11-27

## 2018-11-27 NOTE — TELEPHONE ENCOUNTER
Patient is on Lt 4 88 mcg daily per LOV note. Hence please send for 88 mcg daily. Also, she needs labs TSH and Free T4. Please remind her to do labs. Okay to send a my chart message. Also, please confirm current dose in the message. Thanks.

## 2018-11-28 ENCOUNTER — PATIENT MESSAGE (OUTPATIENT)
Dept: ENDOCRINOLOGY CLINIC | Facility: CLINIC | Age: 58
End: 2018-11-28

## 2018-11-28 RX ORDER — LOSARTAN POTASSIUM 50 MG/1
TABLET ORAL
Qty: 90 TABLET | Refills: 0 | Status: SHIPPED | OUTPATIENT
Start: 2018-11-28 | End: 2019-03-12

## 2018-12-05 RX ORDER — LEVOTHYROXINE SODIUM 88 UG/1
88 TABLET ORAL
Qty: 90 TABLET | Refills: 0 | Status: SHIPPED | OUTPATIENT
Start: 2018-12-05 | End: 2019-04-12

## 2018-12-07 ENCOUNTER — APPOINTMENT (OUTPATIENT)
Dept: LAB | Facility: HOSPITAL | Age: 58
End: 2018-12-07
Attending: INTERNAL MEDICINE
Payer: COMMERCIAL

## 2018-12-07 ENCOUNTER — PATIENT MESSAGE (OUTPATIENT)
Dept: ENDOCRINOLOGY CLINIC | Facility: CLINIC | Age: 58
End: 2018-12-07

## 2018-12-07 DIAGNOSIS — E03.9 HYPOTHYROIDISM, UNSPECIFIED TYPE: ICD-10-CM

## 2018-12-07 PROCEDURE — 84439 ASSAY OF FREE THYROXINE: CPT

## 2018-12-07 PROCEDURE — 36415 COLL VENOUS BLD VENIPUNCTURE: CPT

## 2018-12-07 PROCEDURE — 84443 ASSAY THYROID STIM HORMONE: CPT

## 2018-12-10 NOTE — TELEPHONE ENCOUNTER
Our office sent refill for 88mcg dose on 12/5. Informed patient she should be taking 88mcg dose and ok to book appt to discuss results in detail. Advised AM out of office until 12/17.

## 2018-12-10 NOTE — TELEPHONE ENCOUNTER
From: Mandy Suggs  To: Marichuy Evans MD  Sent: 12/7/2018 7:11 PM CST  Subject: Test Results Question    I am concern as to why such a different In my RANGE between 9/18 /2018 and 12/7/2018.     1. Dr. Chelsy Santos changed my medication from [facility] to 69S

## 2019-02-02 RX ORDER — LEVOTHYROXINE SODIUM 75 MCG
TABLET ORAL
Qty: 90 TABLET | Refills: 0 | Status: SHIPPED | OUTPATIENT
Start: 2019-02-02 | End: 2019-04-02

## 2019-02-11 ENCOUNTER — OFFICE VISIT (OUTPATIENT)
Dept: INTERNAL MEDICINE CLINIC | Facility: CLINIC | Age: 59
End: 2019-02-11
Payer: COMMERCIAL

## 2019-02-11 VITALS
WEIGHT: 190 LBS | DIASTOLIC BLOOD PRESSURE: 78 MMHG | HEART RATE: 67 BPM | SYSTOLIC BLOOD PRESSURE: 132 MMHG | BODY MASS INDEX: 26 KG/M2

## 2019-02-11 DIAGNOSIS — J30.9 ALLERGIC RHINITIS, UNSPECIFIED SEASONALITY, UNSPECIFIED TRIGGER: ICD-10-CM

## 2019-02-11 DIAGNOSIS — B00.9 HERPES SIMPLEX: ICD-10-CM

## 2019-02-11 DIAGNOSIS — E03.9 ACQUIRED HYPOTHYROIDISM: ICD-10-CM

## 2019-02-11 DIAGNOSIS — Z00.00 ROUTINE PHYSICAL EXAMINATION: Primary | ICD-10-CM

## 2019-02-11 DIAGNOSIS — I10 HTN (HYPERTENSION), BENIGN: ICD-10-CM

## 2019-02-11 PROCEDURE — 99396 PREV VISIT EST AGE 40-64: CPT | Performed by: PHYSICIAN ASSISTANT

## 2019-02-11 NOTE — PROGRESS NOTES
HPI:    Patient ID: Fanta Hernandez is a 62year old female. HPI   Patient with past medical history of hypertension, hypothyroidism presents today requesting physical exam. Was last seen in the office on 9/2018 for hypertension follow-up.  At that time n before breakfast. Disp: 90 tablet Rfl: 0   LOSARTAN POTASSIUM 50 MG Oral Tab TAKE 1 TABLET DAILY Disp: 90 tablet Rfl: 0   aspirin 81 MG Oral Tab Take 81 mg by mouth daily.  Disp:  Rfl:    Phenylephrine HCl 10 MG Oral Tab Take 10 mg by mouth every 4 (four) h time. No cranial nerve deficit. Skin: Skin is warm and dry. Psychiatric: She has a normal mood and affect. Her behavior is normal.              ASSESSMENT/PLAN:   1. Routine physical examination  Patient here with active issues as listed below.   Physic

## 2019-03-12 RX ORDER — LOSARTAN POTASSIUM 50 MG/1
TABLET ORAL
Qty: 90 TABLET | Refills: 0 | Status: SHIPPED | OUTPATIENT
Start: 2019-03-12 | End: 2019-06-10

## 2019-04-02 ENCOUNTER — TELEPHONE (OUTPATIENT)
Dept: ENDOCRINOLOGY CLINIC | Facility: CLINIC | Age: 59
End: 2019-04-02

## 2019-04-02 RX ORDER — LEVOTHYROXINE SODIUM 0.07 MG/1
TABLET ORAL
Qty: 90 TABLET | Refills: 0 | Status: SHIPPED | OUTPATIENT
Start: 2019-04-02 | End: 2019-04-10

## 2019-04-02 NOTE — TELEPHONE ENCOUNTER
Shira calling for pt regarding rx:SYNTHROID, ps call SX:[phone] / Henrique Abdi [phone].     Current Outpatient Medications:   •  SYNTHROID 75 MCG Oral Tab, TAKE 1 TABLET DAILY BEFORE BREAKFAST, Disp: 90 tablet, Rfl: 0

## 2019-04-03 NOTE — TELEPHONE ENCOUNTER
Refilled synthroid for 75 mcg daily per request from pharmacy.    However, noted that patient is on Synthroid 88 mcg daily  Please clarify patient and correct prescription at the phramacy  Thanks

## 2019-04-10 ENCOUNTER — TELEPHONE (OUTPATIENT)
Dept: ENDOCRINOLOGY CLINIC | Facility: CLINIC | Age: 59
End: 2019-04-10

## 2019-04-12 RX ORDER — LEVOTHYROXINE SODIUM 88 UG/1
88 TABLET ORAL
Qty: 90 TABLET | Refills: 0 | Status: SHIPPED | OUTPATIENT
Start: 2019-04-12 | End: 2019-07-05

## 2019-04-12 RX ORDER — LEVOTHYROXINE SODIUM 88 UG/1
88 TABLET ORAL
Qty: 90 TABLET | Refills: 1 | Status: SHIPPED | OUTPATIENT
Start: 2019-04-12 | End: 2019-06-07

## 2019-04-12 RX ORDER — LEVOTHYROXINE SODIUM 88 UG/1
88 TABLET ORAL
Qty: 90 TABLET | Refills: 1 | OUTPATIENT
Start: 2019-04-12

## 2019-04-12 RX ORDER — LEVOTHYROXINE SODIUM 0.07 MG/1
TABLET ORAL
Qty: 90 TABLET | Refills: 0 | Status: SHIPPED | OUTPATIENT
Start: 2019-04-12 | End: 2019-06-07

## 2019-04-12 NOTE — TELEPHONE ENCOUNTER
Pt states she is taking 88 mcg. Levothyroxine 88 mcg sent to mail order per pt per AM written. See enc.  Dtd 4/10

## 2019-04-12 NOTE — TELEPHONE ENCOUNTER
Spoke with patient who states she is a  and is currently out of the area and will return on Monday 4/15. She states she should receive order from mail order by then so nothing is needed at this time.  However RN confirmed dose is 88 mcg w/ p

## 2019-04-12 NOTE — TELEPHONE ENCOUNTER
Medication -Levothyroxine 88 mcg has already sent to local pharm since mail order is unable to fill by Monday when pt returns to clinic. Previous dose LT4 75 mcg has been d/c at mail order pharm. Pt made aware via [address]     Already filled - forwarded to AM fo

## 2019-04-12 NOTE — TELEPHONE ENCOUNTER
Per patient request, please send 5 pills to [email or web address] as she is out, please check raquel her which one.    Thanks

## 2019-05-31 ENCOUNTER — TELEPHONE (OUTPATIENT)
Dept: INTERNAL MEDICINE CLINIC | Facility: CLINIC | Age: 59
End: 2019-05-31

## 2019-05-31 DIAGNOSIS — Z12.31 VISIT FOR SCREENING MAMMOGRAM: Primary | ICD-10-CM

## 2019-05-31 NOTE — TELEPHONE ENCOUNTER
Pt requesting orders for annual mammogram be added to the chart. She also states that she is not due for Pneumococcal PPSV23/PCV13 Highest Risk Adult (1 of 3 - PCV13) and to remove from her notifications.      Please call back with confirmation once adde

## 2019-06-01 NOTE — TELEPHONE ENCOUNTER
Contacted pt and informed her that mammogram order is in the system, and to contact central scheduling at [phone] for an apt.   She verbalized understanding

## 2019-06-07 ENCOUNTER — OFFICE VISIT (OUTPATIENT)
Dept: INTERNAL MEDICINE CLINIC | Facility: CLINIC | Age: 59
End: 2019-06-07
Payer: COMMERCIAL

## 2019-06-07 VITALS
HEIGHT: 72 IN | TEMPERATURE: 98 F | HEART RATE: 75 BPM | SYSTOLIC BLOOD PRESSURE: 132 MMHG | WEIGHT: 194 LBS | DIASTOLIC BLOOD PRESSURE: 92 MMHG | BODY MASS INDEX: 26.28 KG/M2

## 2019-06-07 DIAGNOSIS — I10 HTN (HYPERTENSION), BENIGN: ICD-10-CM

## 2019-06-07 DIAGNOSIS — H65.93 BILATERAL NON-SUPPURATIVE OTITIS MEDIA: ICD-10-CM

## 2019-06-07 DIAGNOSIS — J01.00 ACUTE NON-RECURRENT MAXILLARY SINUSITIS: Primary | ICD-10-CM

## 2019-06-07 PROCEDURE — 99212 OFFICE O/P EST SF 10 MIN: CPT | Performed by: INTERNAL MEDICINE

## 2019-06-07 PROCEDURE — 99213 OFFICE O/P EST LOW 20 MIN: CPT | Performed by: INTERNAL MEDICINE

## 2019-06-07 RX ORDER — AMOXICILLIN 875 MG/1
875 TABLET, COATED ORAL 2 TIMES DAILY
Qty: 20 TABLET | Refills: 0 | Status: SHIPPED | OUTPATIENT
Start: 2019-06-07 | End: 2019-06-13 | Stop reason: ALTCHOICE

## 2019-06-07 NOTE — PROGRESS NOTES
Alejandra Collado is a 62year old female.   Patient presents with:  Sinusitis: sinus pressure, congestion, facial pain started 5/29       HPI:   Pt comes in as an urgent visit   C/c sinus congestion   C/o sinus congestion and pressure and facial pain that sta Used    Alcohol use: No      Alcohol/week: 0.0 oz      Frequency: Never    Drug use: No       REVIEW OF SYSTEMS:   GENERAL HEALTH: No fevers, chills, sweats, fatigue  VISION: No recent vision problems, blurry vision or double vision  HEENT: No decreased he which may contribute         The patient indicates understanding of these issues and agrees to the plan. No follow-ups on file.

## 2019-06-07 NOTE — PATIENT INSTRUCTIONS
Eating for your heart doesn’t have to be hard or boring. You just need to know how to make healthier choices. The DASH eating plan has been developed to help you do just that. DASH stands for Dietary Approaches to Stop Hypertension.  It is a plan that has b visible fat. Broil, grill, roast, or boil instead of frying. Remove skin from poultry before eating.  Limit how much red meat you eat.  Nuts, seeds, beans  Servings: 4 to 5 a week  A serving is:  · One-third cup nuts (one and a half ounces)  · 2 tablespoons salt  Stay away from:  · Sausage, calles, and ham  · Flour tortillas  · Packaged muffins, pancakes, and biscuits  · Instant hot cereals  · Cottage cheese  For lunch and dinner  · Fresh fish, chicken, turkey, or meat—baked, broiled, or roasted without salt

## 2019-06-11 RX ORDER — LOSARTAN POTASSIUM 50 MG/1
TABLET ORAL
Qty: 90 TABLET | Refills: 1 | Status: SHIPPED | OUTPATIENT
Start: 2019-06-11 | End: 2019-10-22

## 2019-06-13 ENCOUNTER — OFFICE VISIT (OUTPATIENT)
Dept: OTOLARYNGOLOGY | Facility: CLINIC | Age: 59
End: 2019-06-13
Payer: COMMERCIAL

## 2019-06-13 VITALS
HEIGHT: 69 IN | TEMPERATURE: 97 F | SYSTOLIC BLOOD PRESSURE: 132 MMHG | DIASTOLIC BLOOD PRESSURE: 73 MMHG | BODY MASS INDEX: 28.73 KG/M2 | RESPIRATION RATE: 15 BRPM | HEART RATE: 79 BPM | WEIGHT: 194 LBS

## 2019-06-13 DIAGNOSIS — R09.82 POSTNASAL DISCHARGE: Primary | ICD-10-CM

## 2019-06-13 PROCEDURE — 99212 OFFICE O/P EST SF 10 MIN: CPT | Performed by: OTOLARYNGOLOGY

## 2019-06-13 PROCEDURE — 99203 OFFICE O/P NEW LOW 30 MIN: CPT | Performed by: OTOLARYNGOLOGY

## 2019-06-13 RX ORDER — AZELASTINE 1 MG/ML
2 SPRAY, METERED NASAL 2 TIMES DAILY
Qty: 1 BOTTLE | Refills: 0 | Status: SHIPPED | OUTPATIENT
Start: 2019-06-13 | End: 2019-07-05

## 2019-06-13 RX ORDER — LORATADINE 10 MG/1
10 TABLET ORAL DAILY
Qty: 30 TABLET | Refills: 3 | Status: SHIPPED | OUTPATIENT
Start: 2019-06-13 | End: 2019-10-22 | Stop reason: ALTCHOICE

## 2019-06-13 RX ORDER — MONTELUKAST SODIUM 10 MG/1
10 TABLET ORAL NIGHTLY
Qty: 30 TABLET | Refills: 3 | Status: SHIPPED | OUTPATIENT
Start: 2019-06-13 | End: 2019-10-22 | Stop reason: ALTCHOICE

## 2019-06-13 NOTE — PROGRESS NOTES
Patricia Fay is a 62year old female. Patient presents with:  Sore Throat: swollen, red tonsils and nasal congestion. x1 week.       HISTORY OF PRESENT ILLNESS    She presents with a one-week history of a sore throat with swollen red tonsils and nasal c 9/16/17- stable IOP, so D/C Latanoprost qhs OU, 1/17/18 RESUME LATANOPROST QHS OU DUE TO IOP OF 22/26 AND PT CONCERN   • Herpes simplex 2005    vaginal   • Hypothyroidism    • Neutropenia ([facility] [address]     saw hematologist 2 years ago, told was nl, from family Normal. Oropharynx - Normal.   Ears Normal Inspection - Right: Normal, Left: Normal. Canal - Right: Normal, Left: Normal. TM - Right: Normal, Left: Normal.   Skin Normal Inspection - Normal.        Lymph Detail Normal Submental. Submandibular.  Anterior cer

## 2019-07-05 RX ORDER — LEVOTHYROXINE SODIUM 88 MCG
TABLET ORAL
Qty: 90 TABLET | Refills: 0 | Status: SHIPPED | OUTPATIENT
Start: 2019-07-05 | End: 2019-11-13

## 2019-07-05 RX ORDER — AZELASTINE HYDROCHLORIDE 137 UG/1
SPRAY, METERED NASAL
Qty: 1 BOTTLE | Refills: 0 | Status: SHIPPED | OUTPATIENT
Start: 2019-07-05 | End: 2019-11-13

## 2019-07-09 ENCOUNTER — OFFICE VISIT (OUTPATIENT)
Dept: OTOLARYNGOLOGY | Facility: CLINIC | Age: 59
End: 2019-07-09
Payer: COMMERCIAL

## 2019-07-09 VITALS
SYSTOLIC BLOOD PRESSURE: 125 MMHG | WEIGHT: 194 LBS | DIASTOLIC BLOOD PRESSURE: 83 MMHG | HEIGHT: 69 IN | BODY MASS INDEX: 28.73 KG/M2 | TEMPERATURE: 99 F

## 2019-07-09 DIAGNOSIS — R09.82 POSTNASAL DISCHARGE: Primary | ICD-10-CM

## 2019-07-09 PROCEDURE — 99212 OFFICE O/P EST SF 10 MIN: CPT | Performed by: OTOLARYNGOLOGY

## 2019-07-09 PROCEDURE — 99214 OFFICE O/P EST MOD 30 MIN: CPT | Performed by: OTOLARYNGOLOGY

## 2019-07-15 ENCOUNTER — HOSPITAL ENCOUNTER (EMERGENCY)
Facility: HOSPITAL | Age: 59
Discharge: HOME | End: 2019-07-15
Attending: PHYSICIAN ASSISTANT
Payer: COMMERCIAL

## 2019-07-15 ENCOUNTER — TELEPHONE (OUTPATIENT)
Dept: GASTROENTEROLOGY | Facility: CLINIC | Age: 59
End: 2019-07-15

## 2019-07-15 ENCOUNTER — APPOINTMENT (OUTPATIENT)
Dept: CT IMAGING | Facility: HOSPITAL | Age: 59
End: 2019-07-15
Attending: PHYSICIAN ASSISTANT
Payer: COMMERCIAL

## 2019-07-15 VITALS
HEIGHT: 69 IN | DIASTOLIC BLOOD PRESSURE: 86 MMHG | HEART RATE: 74 BPM | WEIGHT: 194 LBS | OXYGEN SATURATION: 100 % | BODY MASS INDEX: 28.73 KG/M2 | RESPIRATION RATE: 18 BRPM | SYSTOLIC BLOOD PRESSURE: 144 MMHG | TEMPERATURE: 98 F

## 2019-07-15 DIAGNOSIS — N39.0 URINARY TRACT INFECTION WITH HEMATURIA, SITE UNSPECIFIED: ICD-10-CM

## 2019-07-15 DIAGNOSIS — K57.92 ACUTE DIVERTICULITIS: Primary | ICD-10-CM

## 2019-07-15 DIAGNOSIS — R31.9 URINARY TRACT INFECTION WITH HEMATURIA, SITE UNSPECIFIED: ICD-10-CM

## 2019-07-15 LAB
ANION GAP SERPL CALC-SCNC: 7 MMOL/L (ref 0–18)
BASOPHILS # BLD AUTO: 0.05 X10(3) UL (ref 0–0.2)
BASOPHILS NFR BLD AUTO: 0.6 %
BILIRUB UR QL: NEGATIVE
BUN BLD-MCNC: 7 MG/DL (ref 7–18)
BUN/CREAT SERPL: 7.9 (ref 10–20)
CALCIUM BLD-MCNC: 9.2 MG/DL (ref 8.5–10.1)
CHLORIDE SERPL-SCNC: 105 MMOL/L (ref 98–112)
CO2 SERPL-SCNC: 26 MMOL/L (ref 21–32)
COLOR UR: YELLOW
CREAT BLD-MCNC: 0.89 MG/DL (ref 0.55–1.02)
DEPRECATED RDW RBC AUTO: 45.2 FL (ref 35.1–46.3)
EOSINOPHIL # BLD AUTO: 0.02 X10(3) UL (ref 0–0.7)
EOSINOPHIL NFR BLD AUTO: 0.2 %
ERYTHROCYTE [DISTWIDTH] IN BLOOD BY AUTOMATED COUNT: 13.6 % (ref 11–15)
GLUCOSE BLD-MCNC: 106 MG/DL (ref 70–99)
GLUCOSE UR-MCNC: NEGATIVE MG/DL
HCT VFR BLD AUTO: 41 % (ref 35–48)
HGB BLD-MCNC: 13.7 G/DL (ref 12–16)
IMM GRANULOCYTES # BLD AUTO: 0.02 X10(3) UL (ref 0–1)
IMM GRANULOCYTES NFR BLD: 0.2 %
KETONES UR-MCNC: NEGATIVE MG/DL
LEUKOCYTE ESTERASE UR QL STRIP.AUTO: NEGATIVE
LYMPHOCYTES # BLD AUTO: 1.13 X10(3) UL (ref 1–4)
LYMPHOCYTES NFR BLD AUTO: 13.9 %
MCH RBC QN AUTO: 30.2 PG (ref 26–34)
MCHC RBC AUTO-ENTMCNC: 33.4 G/DL (ref 31–37)
MCV RBC AUTO: 90.3 FL (ref 80–100)
MONOCYTES # BLD AUTO: 0.58 X10(3) UL (ref 0.1–1)
MONOCYTES NFR BLD AUTO: 7.1 %
NEUTROPHILS # BLD AUTO: 6.32 X10 (3) UL (ref 1.5–7.7)
NEUTROPHILS # BLD AUTO: 6.32 X10(3) UL (ref 1.5–7.7)
NEUTROPHILS NFR BLD AUTO: 78 %
NITRITE UR QL STRIP.AUTO: NEGATIVE
OSMOLALITY SERPL CALC.SUM OF ELEC: 284 MOSM/KG (ref 275–295)
PH UR: 5 [PH] (ref 5–8)
PLATELET # BLD AUTO: 214 10(3)UL (ref 150–450)
POTASSIUM SERPL-SCNC: 3.6 MMOL/L (ref 3.5–5.1)
PROT UR-MCNC: 30 MG/DL
RBC # BLD AUTO: 4.54 X10(6)UL (ref 3.8–5.3)
RBC #/AREA URNS AUTO: 3 /HPF
SODIUM SERPL-SCNC: 138 MMOL/L (ref 136–145)
SP GR UR STRIP: 1.03 (ref 1–1.03)
UROBILINOGEN UR STRIP-ACNC: <2
VIT C UR-MCNC: 20 MG/DL
WBC # BLD AUTO: 8.1 X10(3) UL (ref 4–11)
WBC #/AREA URNS AUTO: 9 /HPF

## 2019-07-15 PROCEDURE — 99284 EMERGENCY DEPT VISIT MOD MDM: CPT

## 2019-07-15 PROCEDURE — 85025 COMPLETE CBC W/AUTO DIFF WBC: CPT | Performed by: PHYSICIAN ASSISTANT

## 2019-07-15 PROCEDURE — 80048 BASIC METABOLIC PNL TOTAL CA: CPT | Performed by: PHYSICIAN ASSISTANT

## 2019-07-15 PROCEDURE — 96361 HYDRATE IV INFUSION ADD-ON: CPT

## 2019-07-15 PROCEDURE — 87086 URINE CULTURE/COLONY COUNT: CPT | Performed by: PHYSICIAN ASSISTANT

## 2019-07-15 PROCEDURE — 74177 CT ABD & PELVIS W/CONTRAST: CPT | Performed by: PHYSICIAN ASSISTANT

## 2019-07-15 PROCEDURE — 81001 URINALYSIS AUTO W/SCOPE: CPT | Performed by: PHYSICIAN ASSISTANT

## 2019-07-15 PROCEDURE — 96360 HYDRATION IV INFUSION INIT: CPT

## 2019-07-15 RX ORDER — CIPROFLOXACIN 500 MG/1
500 TABLET, FILM COATED ORAL 2 TIMES DAILY
Qty: 20 TABLET | Refills: 0 | Status: SHIPPED | OUTPATIENT
Start: 2019-07-15 | End: 2019-07-25

## 2019-07-15 RX ORDER — ONDANSETRON 4 MG/1
4 TABLET, ORALLY DISINTEGRATING ORAL EVERY 6 HOURS PRN
Qty: 10 TABLET | Refills: 0 | Status: SHIPPED | OUTPATIENT
Start: 2019-07-15 | End: 2019-07-18

## 2019-07-15 RX ORDER — METRONIDAZOLE 500 MG/1
500 TABLET ORAL 3 TIMES DAILY
Qty: 30 TABLET | Refills: 0 | Status: SHIPPED | OUTPATIENT
Start: 2019-07-15 | End: 2019-07-25

## 2019-07-15 NOTE — ED NOTES
Patient presents to ER with c/o lower abdominal pain since yesterday. +nausea/vomiting/diarrhea. Patient states she has been able to keep some liquids down today. Denies urinary symptoms or recent fevers.

## 2019-07-15 NOTE — TELEPHONE ENCOUNTER
Patient states yesterday afternoon started to have lower mid abdominal pain describes as a burning sensation currently pain 7/10, patient took some Pepto Bismol with some relief but vomited 2 hours after taking RX. C/o of chills as well.     Patient has be

## 2019-07-15 NOTE — TELEPHONE ENCOUNTER
I spoke w/ the pt at length ( 5-10 minutes) regarding her s/s.  She reports initially formed stools w/ persistent lower abd pain since yesterday that has now progressed to non-blood diarrhea. + Chills but denies fevers    + Vomiting x2 w/o blood yesterday b

## 2019-07-15 NOTE — TELEPHONE ENCOUNTER
Pt states she is having a diverticulitis flare up and requesting medication.  Please call [phone]

## 2019-07-15 NOTE — ED PROVIDER NOTES
Patient Seen in: [facility] AND [facility] Emergency Department    History   Patient presents with:  Abdomen/Flank Pain (GI/)    Stated Complaint: abdominal pain, diarrhea    HPI      Miranda Silva is a 62year old female who presents with chief complaint of Solution,  2 SPRAYS BY NASAL ROUTE 2 (TWO) TIMES DAILY. Montelukast Sodium 10 MG Oral Tab,  Take 1 tablet (10 mg total) by mouth nightly. loratadine 10 MG Oral Tab,  Take 1 tablet (10 mg total) by mouth daily.    LOSARTAN POTASSIUM 50 MG Oral Tab,  TAKE cooperative. Appears well-developed and well-nourished. Mild discomfort. Psychological: Alert, No abnormalities of mood, affect. Head: Normocephalic/atraumatic. Eyes: Pupils are equal round reactive to light. Conjunctiva are within normal limits.   ENT created for panel order CBC WITH DIFFERENTIAL WITH PLATELET.   Procedure                               Abnormality         Status                     ---------                               -----------         ------                     CBC W/ DIFFERENTIAL[ [id number]  [address] [facility] [address]  [phone]    Schedule an appointment as soon as possible for a visit in 2 days  For follow-up    MD Marian Sanchez [phone]    Schedule an appointment as soon as possible f

## 2019-07-19 ENCOUNTER — TELEPHONE (OUTPATIENT)
Dept: GASTROENTEROLOGY | Facility: CLINIC | Age: 59
End: 2019-07-19

## 2019-07-19 NOTE — TELEPHONE ENCOUNTER
Please schedule patient in overbook next week Monday or wed for recent visit to ER for acute diverticulitis to see if she is doing better and discussing colonoscopy.

## 2019-07-22 NOTE — TELEPHONE ENCOUNTER
[facility] ER calling to check status on ER follow up appt needed for pt. Miguel A Pineda states pt is a . Please contact pt regarding appt needed for Wednesday.   Thank you

## 2019-07-22 NOTE — CM/SW NOTE
Pt returned call and said she can make the appointment on the 15th. I did call the office to see if the appointment was still available and the office will be contacting her since the physician wants her seen this week.  I told the pt the office is trying t

## 2019-07-22 NOTE — TELEPHONE ENCOUNTER
Please open 1 pm overbook in [facility] this Wednesday for the pt.     Pt accepted appt

## 2019-07-23 NOTE — TELEPHONE ENCOUNTER
Future Appointments   Date Time Provider [address] [place]   7/24/2019  1:00 PM Sergey Miner MD [place] [id number] [facility]   8/5/2019 12:40 PM [facility] [address] [facility] EM [address]   8/19/2019  1:30 PM Lauryn Hinkle MD [facility]   9/3/2019  1:30

## 2019-07-24 ENCOUNTER — OFFICE VISIT (OUTPATIENT)
Dept: GASTROENTEROLOGY | Facility: CLINIC | Age: 59
End: 2019-07-24
Payer: COMMERCIAL

## 2019-07-24 VITALS
DIASTOLIC BLOOD PRESSURE: 92 MMHG | SYSTOLIC BLOOD PRESSURE: 150 MMHG | HEIGHT: 69 IN | HEART RATE: 57 BPM | WEIGHT: 197 LBS | BODY MASS INDEX: 29.18 KG/M2

## 2019-07-24 DIAGNOSIS — K57.92 DIVERTICULITIS: Primary | ICD-10-CM

## 2019-07-24 PROCEDURE — 99214 OFFICE O/P EST MOD 30 MIN: CPT | Performed by: INTERNAL MEDICINE

## 2019-07-24 NOTE — PROGRESS NOTES
[facility] - Gastroenterology  Clinic Progress Note    Patient presents with: Follow - Up: from ER      HPI:   Lauryn Godinez is a 62year old year-old female with history of HTN and hypothyroidism presenting for post ER follow up.     Went to yoga • Other (Other) Sister         Colon polyps   • Heart Disorder Sister         stent placement   • Diabetes Neg    • Macular degeneration Neg    • Cancer Neg    • Bleeding Disorders Neg    • Clotting Disorder Neg       Social History: Social History    To hay fever  ENDOCRINE:  negative for cold intolerance and heat intolerance  MUSCULOSKELETAL:  negative for joint effusion/severe erythema  BEHAVIOR/PSYCH:  negative for psychotic behavior      PHYSICAL EXAM:   BP (!) 150/92 (BP Location: Left arm, Patient P findings compatible with acute diverticulitis in the splenic flexure of the large bowel. No evidence of bowel perforation and no organized measurable fluid collection.   However, there is prominent pericolonic fat stranding at the level of   diverticulitis

## 2019-07-24 NOTE — PATIENT INSTRUCTIONS
Instruction:     Low residue diet for total of 2 weeks then can go to high fiber diet  Complete course of antibiotics (ciprofloxicin and metronidazole)  If worsening pain, fevers, nausea, vomiting the go to ER for re-imaging or call office    Given recent

## 2019-07-26 ENCOUNTER — TELEPHONE (OUTPATIENT)
Dept: INTERNAL MEDICINE CLINIC | Facility: CLINIC | Age: 59
End: 2019-07-26

## 2019-07-26 NOTE — TELEPHONE ENCOUNTER
Pt requesting results from 7/15 labs from [place], [place] (). Pt states that she was told she had a UTI, but didn't think she did. She wanted Dr. Deonte Landa to review that labs and explain the results to her, because MyChart wasn't very clear for her.

## 2019-07-27 NOTE — TELEPHONE ENCOUNTER
No uti as per the cultures. Labs looked ok. Ct showed diverticulitis and so that explains the abdominal pain ad diarrhea. The urine analysis did show a few pus cells and some red cells which in the setting of presentation are seen without true uti.   Jose

## 2019-07-27 NOTE — TELEPHONE ENCOUNTER
Pt was called and informed of Dr. Silva Vazquez message below and pt verbalized understanding. Thanks

## 2019-08-11 NOTE — PROGRESS NOTES
HPI:    Patient ID: Ceci Alvarado is a 62year old female. HPI  The patient returns in follow-up.   She was last seen by Dr. Kishan Velazquez on July 24 and by myself at the time of a screening colonoscopy in January 2018 which was negative with the exception of p MG Oral Tab TAKE 1 TABLET DAILY Disp: 90 tablet Rfl: 1   aspirin 81 MG Oral Tab Take 81 mg by mouth daily.  Disp:  Rfl:    latanoprost 0.005 % Ophthalmic Solution Instill 1 drop in both eyes at bedtime Disp: 3 Bottle Rfl: 3   Multiple Vitamins-Minerals (VESTA - 1.00 x10(3) uL 0.58   Eosinophils Absolute      0.00 - 0.70 x10(3) uL 0.02   Basophils Absolute      0.00 - 0.20 x10(3) uL 0.05   Immature Granulocyte Absolute      0.00 - 1.00 x10(3) uL 0.02   Neutrophils %      % 78.0   Lymphocytes %      % 13.9   Mono reconstruction technique for dose reduction was used. FINDINGS:          LIVER:  No enlargement, atrophy, abnormal density, or significant focal lesion. BILIARY:            The gallbladder is present.   No intra- or extrahepatic biliary ductal di Negative.                =====  CONCLUSION:   1. There are findings compatible with acute diverticulitis in the splenic flexure of the large bowel. No evidence of bowel perforation and no organized measurable fluid collection.   However, there is prominen

## 2019-08-12 ENCOUNTER — OFFICE VISIT (OUTPATIENT)
Dept: GASTROENTEROLOGY | Facility: CLINIC | Age: 59
End: 2019-08-12
Payer: COMMERCIAL

## 2019-08-12 VITALS
HEIGHT: 69 IN | SYSTOLIC BLOOD PRESSURE: 148 MMHG | HEART RATE: 92 BPM | WEIGHT: 197 LBS | BODY MASS INDEX: 29.18 KG/M2 | DIASTOLIC BLOOD PRESSURE: 92 MMHG

## 2019-08-12 DIAGNOSIS — K57.30 DIVERTICULOSIS LARGE INTESTINE W/O PERFORATION OR ABSCESS W/O BLEEDING: Primary | ICD-10-CM

## 2019-08-12 PROCEDURE — 99212 OFFICE O/P EST SF 10 MIN: CPT | Performed by: INTERNAL MEDICINE

## 2019-08-12 PROCEDURE — 99213 OFFICE O/P EST LOW 20 MIN: CPT | Performed by: INTERNAL MEDICINE

## 2019-09-07 ENCOUNTER — HOSPITAL ENCOUNTER (OUTPATIENT)
Dept: MAMMOGRAPHY | Facility: HOSPITAL | Age: 59
Discharge: HOME | End: 2019-09-07
Attending: INTERNAL MEDICINE
Payer: COMMERCIAL

## 2019-09-07 DIAGNOSIS — Z12.31 VISIT FOR SCREENING MAMMOGRAM: ICD-10-CM

## 2019-09-07 PROCEDURE — 77067 SCR MAMMO BI INCL CAD: CPT | Performed by: INTERNAL MEDICINE

## 2019-09-07 PROCEDURE — 77063 BREAST TOMOSYNTHESIS BI: CPT | Performed by: INTERNAL MEDICINE

## 2019-09-22 ENCOUNTER — HOSPITAL ENCOUNTER (EMERGENCY)
Facility: HOSPITAL | Age: 59
Discharge: HOME | End: 2019-09-22
Attending: EMERGENCY MEDICINE
Payer: COMMERCIAL

## 2019-09-22 ENCOUNTER — APPOINTMENT (OUTPATIENT)
Dept: GENERAL RADIOLOGY | Facility: HOSPITAL | Age: 59
End: 2019-09-22
Attending: EMERGENCY MEDICINE
Payer: COMMERCIAL

## 2019-09-22 VITALS
OXYGEN SATURATION: 97 % | TEMPERATURE: 99 F | RESPIRATION RATE: 16 BRPM | DIASTOLIC BLOOD PRESSURE: 98 MMHG | HEART RATE: 63 BPM | BODY MASS INDEX: 28.58 KG/M2 | HEIGHT: 69 IN | WEIGHT: 193 LBS | SYSTOLIC BLOOD PRESSURE: 161 MMHG

## 2019-09-22 DIAGNOSIS — S66.211A STRAIN OF EXTENSOR MUSCLE, FASCIA AND TENDON OF RIGHT THUMB AT WRIST AND HAND LEVEL, INITIAL ENCOUNTER: Primary | ICD-10-CM

## 2019-09-22 PROCEDURE — 73130 X-RAY EXAM OF HAND: CPT | Performed by: EMERGENCY MEDICINE

## 2019-09-22 PROCEDURE — 99283 EMERGENCY DEPT VISIT LOW MDM: CPT

## 2019-09-22 RX ORDER — NAPROXEN 500 MG/1
500 TABLET ORAL 2 TIMES DAILY WITH MEALS
Qty: 10 TABLET | Refills: 0 | Status: SHIPPED | OUTPATIENT
Start: 2019-09-22 | End: 2019-09-27

## 2019-09-22 NOTE — ED NOTES
Jodi Kipper c/o right-greater-than-left wrist and thumb pain for past several weeks. Denies specific traumatic injury that caused her pain. Distal CMS intact to upper extremities. She rates her pain 9/10. Denies other concerning symptoms.

## 2019-09-22 NOTE — ED PROVIDER NOTES
Patient Seen in: [facility] AND [facility] Emergency Department      History   Patient presents with:  Upper Extremity Injury (musculoskeletal)    Stated Complaint: 'thumbs are aching'    HPI    51-year-old female with a  who is relatively health Temp 98.9 °F (37.2 °C)   Temp src    SpO2 93 %   O2 Device        Current:BP (!) 158/92   Pulse 78   Temp 98.9 °F (37.2 °C)   Resp 18   Ht 175.3 cm (5' 9\")   Wt 87.5 kg   LMP  (LMP Unknown)   SpO2 93%   BMI 28.50 kg/m²         Physical Exam    Constitut OTHER: Negative. CONCLUSION:   No acute fracture. Mild degenerative changes at the 1st carpometacarpal joint.   Dictated by (CST): Etelvina Jorge MD on 9/22/2019 at 7:57     Approved by (CST): Etelvina Jorge MD on 9/22/2019 at 7:59          M 16:45                [facility]     Patient here negative. Patient will ice and use anti-inflammatories. I placed her in a thumb spica splint that is Velcro that she can take on and off. I encouraged range of motion and stretching activities.   She needs to fol

## 2019-09-22 NOTE — ED INITIAL ASSESSMENT (HPI)
Pt states both her thumbs have been aching x 2 weeks. Denies swelling. No deformity noted. No OTC pain meds taken within the two weeks for relief.

## 2019-09-30 PROBLEM — M79.644 CHRONIC PAIN OF RIGHT THUMB: Status: ACTIVE | Noted: 2019-09-30

## 2019-09-30 PROBLEM — G89.29 CHRONIC PAIN OF RIGHT THUMB: Status: ACTIVE | Noted: 2019-09-30

## 2019-09-30 PROBLEM — M77.8 TENDONITIS OF WRIST, RIGHT: Status: ACTIVE | Noted: 2019-09-30

## 2019-10-22 ENCOUNTER — OFFICE VISIT (OUTPATIENT)
Dept: INTERNAL MEDICINE CLINIC | Facility: CLINIC | Age: 59
End: 2019-10-22
Payer: COMMERCIAL

## 2019-10-22 ENCOUNTER — TELEPHONE (OUTPATIENT)
Dept: INTERNAL MEDICINE CLINIC | Facility: CLINIC | Age: 59
End: 2019-10-22

## 2019-10-22 VITALS
WEIGHT: 193 LBS | HEIGHT: 69 IN | DIASTOLIC BLOOD PRESSURE: 96 MMHG | BODY MASS INDEX: 28.58 KG/M2 | SYSTOLIC BLOOD PRESSURE: 144 MMHG | HEART RATE: 66 BPM

## 2019-10-22 DIAGNOSIS — E03.9 ACQUIRED HYPOTHYROIDISM: ICD-10-CM

## 2019-10-22 DIAGNOSIS — Z78.0 MENOPAUSE: ICD-10-CM

## 2019-10-22 DIAGNOSIS — Z00.00 ROUTINE PHYSICAL EXAMINATION: ICD-10-CM

## 2019-10-22 DIAGNOSIS — E55.9 VITAMIN D DEFICIENCY: ICD-10-CM

## 2019-10-22 DIAGNOSIS — I10 HTN (HYPERTENSION), BENIGN: Primary | ICD-10-CM

## 2019-10-22 PROCEDURE — 99214 OFFICE O/P EST MOD 30 MIN: CPT | Performed by: INTERNAL MEDICINE

## 2019-10-22 PROCEDURE — 99212 OFFICE O/P EST SF 10 MIN: CPT | Performed by: INTERNAL MEDICINE

## 2019-10-22 PROCEDURE — 1111F DSCHRG MED/CURRENT MED MERGE: CPT | Performed by: INTERNAL MEDICINE

## 2019-10-22 RX ORDER — LOSARTAN POTASSIUM AND HYDROCHLOROTHIAZIDE 12.5; 5 MG/1; MG/1
1 TABLET ORAL DAILY
Qty: 90 TABLET | Refills: 1 | Status: SHIPPED | OUTPATIENT
Start: 2019-10-22 | End: 2019-11-13

## 2019-10-22 NOTE — ASSESSMENT & PLAN NOTE
History of vitamin D deficiency in the past, recheck labs have been ordered as she is not on supplements at this time. Follow-up after completion.

## 2019-10-22 NOTE — ASSESSMENT & PLAN NOTE
Thyroid function tests are stable on levothyroxine at 88 mcg daily. She is advised to continue on the same dose of medication, recheck labs have been ordered.

## 2019-10-22 NOTE — PATIENT INSTRUCTIONS
Problem List Items Addressed This Visit        Unprioritized    HTN (hypertension), benign - Primary     Blood pressure (!) 144/96, pulse 66, height 5' 9\" (1.753 m), weight 193 lb (87.5 kg), not currently breastfeeding.   Blood pressure remains elevated ev

## 2019-10-22 NOTE — PROGRESS NOTES
HPI:    Patient ID: Zuleima Lynne is a 62year old female. Patient lost to follow-up for a while. Has been seen by [facility] for a physical last year.   Labs at that time look normal.  Mammogram just completed look normal.  She is being seen by gynecolo significant relief.      Past Medical History:   Diagnosis Date   • Essential hypertension    • Glaucoma 2006 6/20/17 1st visit- was on Cosopt bid OU and Latanoprost qhs OU- 6/17/17 normal OCT and VF, so D/C Cosopt, but continue Latanoprost qhs OU, 9/16/ TAKE 1 TABLET (88 MCG TOTAL) BY MOUTH BEFORE BREAKFAST. 90 tablet 0   • latanoprost 0.005 % Ophthalmic Solution Instill 1 drop in both eyes at bedtime 3 Bottle 3   • losartan Potassium 50 MG Oral Tab Take 1 tablet (50 mg total) by mouth daily.  90 tablet 1 Psychiatric: She has a normal mood and affect. Her behavior is normal. Thought content normal.   Nursing note and vitals reviewed.              ASSESSMENT/PLAN:     Problem List Items Addressed This Visit        Unprioritized    HTN (hypertension), benign With Platelet      Comp Metabolic Panel (14)      Lipid Panel      Assay, Thyroid Stim Hormone      Urinalysis, Routine      Vitamin B12      Vitamin D, 25-Hydroxy      Meds This Visit:  Requested Prescriptions     Signed Prescriptions Disp Refills   • Los

## 2019-10-22 NOTE — ASSESSMENT & PLAN NOTE
Blood pressure (!) 144/96, pulse 66, height 5' 9\" (1.753 m), weight 193 lb (87.5 kg), not currently breastfeeding. Blood pressure remains elevated even upon recheck. Patient has been on losartan 50 mg daily.   In the past she had been on valsartan hydroc

## 2019-10-22 NOTE — TELEPHONE ENCOUNTER
Product in back order/unavailable    •  Losartan Potassium-HCTZ 50-12.5 MG Oral Tab, Take 1 tablet by mouth daily. , Disp: 90 tablet, Rfl: 1    Suggested Alternatives:  Candesartan-HCTZ  Telmisartan-HCTZ

## 2019-10-26 RX ORDER — LOSARTAN POTASSIUM 50 MG/1
50 TABLET ORAL DAILY
Qty: 90 TABLET | Refills: 1 | Status: SHIPPED | OUTPATIENT
Start: 2019-10-26 | End: 2019-11-13

## 2019-10-26 RX ORDER — HYDROCHLOROTHIAZIDE 12.5 MG/1
12.5 TABLET ORAL DAILY
Qty: 90 TABLET | Refills: 1 | Status: SHIPPED | OUTPATIENT
Start: 2019-10-26 | End: 2019-11-13

## 2019-10-30 ENCOUNTER — PATIENT MESSAGE (OUTPATIENT)
Dept: INTERNAL MEDICINE CLINIC | Facility: CLINIC | Age: 59
End: 2019-10-30

## 2019-10-30 DIAGNOSIS — Z00.00 ROUTINE PHYSICAL EXAMINATION: Primary | ICD-10-CM

## 2019-10-30 NOTE — TELEPHONE ENCOUNTER
From: Giacomo Cyr  To: Padmini Jacome MD  Sent: 10/30/2019 7:05 AM CDT  Subject: Visit Follow-up Question    Is is possible to have my lab work changed to [facility]  I PERFER TO HAVE MY LAB WORK DONE AT [address] [place] [address].    thank you

## 2019-11-01 ENCOUNTER — PATIENT MESSAGE (OUTPATIENT)
Dept: INTERNAL MEDICINE CLINIC | Facility: CLINIC | Age: 59
End: 2019-11-01

## 2019-11-01 NOTE — TELEPHONE ENCOUNTER
From: Lamont Garcia  To: Luciano Blankenship MD  Sent: 11/1/2019 6:54 AM CDT  Subject: Non-Urgent Medical Question    Dear Felisa Pickens,    Thank you for addressing my Labs Location change request.    I have one more question please.    I do not currently have a workin

## 2019-11-06 ENCOUNTER — TELEPHONE (OUTPATIENT)
Dept: INTERNAL MEDICINE CLINIC | Facility: CLINIC | Age: 59
End: 2019-11-06

## 2019-11-06 NOTE — TELEPHONE ENCOUNTER
Received fax with Taran Barnett lab results from 11/05/2019 including CMP, CBC, U/A, Lipid, TSH, Vitamin D & B12.    Placed on RADHA's [facility] desk for review.

## 2019-11-12 ENCOUNTER — HOSPITAL ENCOUNTER (OUTPATIENT)
Dept: BONE DENSITY | Age: 59
Discharge: HOME | End: 2019-11-12
Attending: INTERNAL MEDICINE
Payer: COMMERCIAL

## 2019-11-12 DIAGNOSIS — Z78.0 MENOPAUSE: ICD-10-CM

## 2019-11-12 PROCEDURE — 77080 DXA BONE DENSITY AXIAL: CPT | Performed by: INTERNAL MEDICINE

## 2019-11-13 ENCOUNTER — OFFICE VISIT (OUTPATIENT)
Dept: INTERNAL MEDICINE CLINIC | Facility: CLINIC | Age: 59
End: 2019-11-13
Payer: COMMERCIAL

## 2019-11-13 VITALS
HEIGHT: 69 IN | RESPIRATION RATE: 22 BRPM | DIASTOLIC BLOOD PRESSURE: 74 MMHG | BODY MASS INDEX: 29.25 KG/M2 | WEIGHT: 197.5 LBS | HEART RATE: 84 BPM | SYSTOLIC BLOOD PRESSURE: 115 MMHG | TEMPERATURE: 98 F

## 2019-11-13 DIAGNOSIS — E55.9 VITAMIN D DEFICIENCY: ICD-10-CM

## 2019-11-13 DIAGNOSIS — I36.1 NONRHEUMATIC TRICUSPID VALVE REGURGITATION: ICD-10-CM

## 2019-11-13 DIAGNOSIS — E03.9 ACQUIRED HYPOTHYROIDISM: ICD-10-CM

## 2019-11-13 DIAGNOSIS — I10 HTN (HYPERTENSION), BENIGN: Primary | ICD-10-CM

## 2019-11-13 PROCEDURE — 99212 OFFICE O/P EST SF 10 MIN: CPT | Performed by: INTERNAL MEDICINE

## 2019-11-13 PROCEDURE — 99214 OFFICE O/P EST MOD 30 MIN: CPT | Performed by: INTERNAL MEDICINE

## 2019-11-13 RX ORDER — LOSARTAN POTASSIUM 50 MG/1
50 TABLET ORAL DAILY
Qty: 90 TABLET | Refills: 1 | Status: SHIPPED | OUTPATIENT
Start: 2019-11-13 | End: 2020-03-18

## 2019-11-13 RX ORDER — HYDROCHLOROTHIAZIDE 12.5 MG/1
12.5 TABLET ORAL DAILY
Qty: 90 TABLET | Refills: 1 | Status: SHIPPED | OUTPATIENT
Start: 2019-11-13 | End: 2020-06-29

## 2019-11-13 RX ORDER — LEVOTHYROXINE SODIUM 88 UG/1
TABLET ORAL
Qty: 90 TABLET | Refills: 1 | Status: SHIPPED | OUTPATIENT
Start: 2019-11-13 | End: 2020-12-26

## 2019-11-13 RX ORDER — CELECOXIB 200 MG/1
200 CAPSULE ORAL
Qty: 35 CAPSULE | Refills: 3 | Status: CANCELLED | OUTPATIENT
Start: 2019-11-13

## 2019-11-13 NOTE — PATIENT INSTRUCTIONS
Problem List Items Addressed This Visit        Unprioritized    HTN (hypertension), benign - Primary     Blood pressure 115/74, pulse 84, temperature 98.4 °F (36.9 °C), temperature source Oral, resp.  rate 22, height 5' 9\" (1.753 m), weight 197 lb 8 oz (89

## 2019-11-13 NOTE — ASSESSMENT & PLAN NOTE
Thyroid function tests are stable on levothyroxine at 88 mcg daily. She has tolerated her medications well. Continue the same dose of medication.

## 2019-11-13 NOTE — PROGRESS NOTES
HPI:    Patient ID: Gerson Garcia is a 61year old female. Completed at [address]-  Total white count slightly low at 3.3 but the differentials with the white count all look normal.  Hemoglobin and hematocrit looks normal 13.1 and 40.3.   Platelets normal blood in the urine or dysuria.). The patient is experiencing no pain. Pertinent negatives include no hematuria, joint pain, nausea, urgency or vomiting. Associated symptoms comments: Past history of diverticulitis–      CONCLUSION:   1.   There are findings (now resolved). Negative for hematuria and urgency. Musculoskeletal: Negative. Negative for arthralgias and joint pain. Skin: Negative. Allergic/Immunologic: Negative. Neurological: Negative. Negative for weakness. Hematological: Negative. normal. She exhibits no distension, no ascites and no mass. There is no hepatosplenomegaly. There is no tenderness. There is no rebound and no CVA tenderness. No hernia. Musculoskeletal: Normal range of motion. General: Edema present.  No tenderness hence advised to take an over-the-counter vitamin D 2000 units daily and will recheck this in about 6 months. Nonrheumatic tricuspid valve regurgitation     To moderate regurgitation on the last echocardiogram done in February 2018.   Repeat next ye

## 2019-11-13 NOTE — ASSESSMENT & PLAN NOTE
Blood pressure 115/74, pulse 84, temperature 98.4 °F (36.9 °C), temperature source Oral, resp. rate 22, height 5' 9\" (1.753 m), weight 197 lb 8 oz (89.6 kg), last menstrual period 10/01/2013, not currently breastfeeding.      Blood pressure looks great at

## 2019-11-13 NOTE — ASSESSMENT & PLAN NOTE
To moderate regurgitation on the last echocardiogram done in February 2018. Repeat next year. Asymptomatic at this time and blood pressures are well controlled.   Pulmonary pressures look normal.

## 2019-11-13 NOTE — ASSESSMENT & PLAN NOTE
History of vitamin D deficiency, recent labs shows normal vitamin D levels at 30.3. This could drop lower during the course of the winter and hence advised to take an over-the-counter vitamin D 2000 units daily and will recheck this in about 6 months.

## 2019-11-20 ENCOUNTER — PATIENT MESSAGE (OUTPATIENT)
Dept: INTERNAL MEDICINE CLINIC | Facility: CLINIC | Age: 59
End: 2019-11-20

## 2019-11-21 NOTE — TELEPHONE ENCOUNTER
Dr. Sharyle Mealing please advise if you would like patient seen or will write script. Never prescribed before. Script pended. Review pended refill request as it does not fall under a protocol.     Last Rx: never prescribed  LOV: 11/13/19

## 2019-11-21 NOTE — TELEPHONE ENCOUNTER
From: Ceci Alvarado  To: Tomeka Pereira MD  Sent: 11/20/2019 12:55 AM CST  Subject: Prescription Question    Dear Dr Azalea Irvin,      I have been experiencing pain on my right hand near the thumb area. I came in to the hospital on 09-22. 2019 for an hand xray.

## 2019-12-12 ENCOUNTER — OFFICE VISIT (OUTPATIENT)
Dept: OBGYN CLINIC | Facility: CLINIC | Age: 59
End: 2019-12-12
Payer: COMMERCIAL

## 2019-12-12 VITALS
BODY MASS INDEX: 30 KG/M2 | WEIGHT: 200 LBS | DIASTOLIC BLOOD PRESSURE: 87 MMHG | SYSTOLIC BLOOD PRESSURE: 136 MMHG | HEART RATE: 72 BPM

## 2019-12-12 DIAGNOSIS — R10.31 RLQ ABDOMINAL PAIN: Primary | ICD-10-CM

## 2019-12-12 PROCEDURE — 99213 OFFICE O/P EST LOW 20 MIN: CPT | Performed by: OBSTETRICS & GYNECOLOGY

## 2019-12-12 NOTE — H&P
HPI:  The patient is a 63 yo PMF c/o 6 weeks of intermittent RLQ pain. QOD and 2/10 on pain scale. Non radiating and no exacerbating factors. Pain only lasts for 2 minutes and sharp in nature. NO dc or vb. Not sexually active.   Moves bowels daily w/o Frequency: Never      Drug use: No      Sexual activity: Not Currently    Lifestyle      Physical activity:        Days per week: Not on file        Minutes per session: Not on file      Stress: Not on file    Relationships      Social connections: BEFORE BREAKFAST., Disp: 90 tablet, Rfl: 1  •  losartan Potassium 50 MG Oral Tab, Take 1 tablet (50 mg total) by mouth daily. , Disp: 90 tablet, Rfl: 1  •  hydrochlorothiazide 12.5 MG Oral Tab, Take 1 tablet (12.5 mg total) by mouth daily. , Disp: 90 tablet, Future        Exam unremarkable. Pt comfortable appearing. Unable to recreate symptoms on exam.  Patient requesting US for evaluation. Order placed. Pt past due for annual and will schedule after US performed.   All questions answered

## 2019-12-17 ENCOUNTER — HOSPITAL ENCOUNTER (OUTPATIENT)
Dept: CV DIAGNOSTICS | Facility: HOSPITAL | Age: 59
Discharge: HOME | End: 2019-12-17
Attending: INTERNAL MEDICINE
Payer: COMMERCIAL

## 2019-12-17 DIAGNOSIS — I10 HTN (HYPERTENSION), BENIGN: ICD-10-CM

## 2019-12-17 PROCEDURE — 93016 CV STRESS TEST SUPVJ ONLY: CPT | Performed by: INTERNAL MEDICINE

## 2019-12-17 PROCEDURE — 93018 CV STRESS TEST I&R ONLY: CPT | Performed by: INTERNAL MEDICINE

## 2019-12-17 PROCEDURE — 93017 CV STRESS TEST TRACING ONLY: CPT | Performed by: INTERNAL MEDICINE

## 2019-12-30 ENCOUNTER — HOSPITAL ENCOUNTER (OUTPATIENT)
Dept: ULTRASOUND IMAGING | Facility: HOSPITAL | Age: 59
Discharge: HOME | End: 2019-12-30
Attending: OBSTETRICS & GYNECOLOGY
Payer: COMMERCIAL

## 2019-12-30 DIAGNOSIS — R10.31 RLQ ABDOMINAL PAIN: ICD-10-CM

## 2019-12-30 PROCEDURE — 76830 TRANSVAGINAL US NON-OB: CPT | Performed by: OBSTETRICS & GYNECOLOGY

## 2019-12-30 PROCEDURE — 76856 US EXAM PELVIC COMPLETE: CPT | Performed by: OBSTETRICS & GYNECOLOGY

## 2020-02-11 PROBLEM — M18.11 ARTHRITIS OF CARPOMETACARPAL (CMC) JOINT OF RIGHT THUMB: Status: ACTIVE | Noted: 2020-02-11

## 2020-02-25 PROBLEM — M77.8 TENDINITIS OF RIGHT WRIST: Status: ACTIVE | Noted: 2019-09-30

## 2020-02-25 PROBLEM — M67.40 GANGLION: Status: ACTIVE | Noted: 2020-02-25

## 2020-02-25 PROBLEM — S63.501A SPRAIN OF RIGHT WRIST: Status: ACTIVE | Noted: 2020-02-25

## 2020-03-18 RX ORDER — LOSARTAN POTASSIUM 50 MG/1
TABLET ORAL
Qty: 90 TABLET | Refills: 1 | Status: SHIPPED | OUTPATIENT
Start: 2020-03-18 | End: 2020-10-27

## 2020-06-29 RX ORDER — HYDROCHLOROTHIAZIDE 12.5 MG/1
TABLET ORAL
Qty: 90 TABLET | Refills: 1 | Status: SHIPPED | OUTPATIENT
Start: 2020-06-29 | End: 2020-12-16

## 2020-10-15 ENCOUNTER — HOSPITAL ENCOUNTER (EMERGENCY)
Facility: HOSPITAL | Age: 60
Discharge: HOME | End: 2020-10-15
Attending: EMERGENCY MEDICINE
Payer: COMMERCIAL

## 2020-10-15 VITALS
RESPIRATION RATE: 18 BRPM | TEMPERATURE: 98 F | BODY MASS INDEX: 26.66 KG/M2 | SYSTOLIC BLOOD PRESSURE: 160 MMHG | DIASTOLIC BLOOD PRESSURE: 76 MMHG | WEIGHT: 180 LBS | OXYGEN SATURATION: 96 % | HEIGHT: 69 IN | HEART RATE: 78 BPM

## 2020-10-15 DIAGNOSIS — Z20.822 ENCOUNTER FOR LABORATORY TESTING FOR COVID-19 VIRUS: ICD-10-CM

## 2020-10-15 DIAGNOSIS — I10 HYPERTENSION, UNSPECIFIED TYPE: Primary | ICD-10-CM

## 2020-10-15 PROCEDURE — 99283 EMERGENCY DEPT VISIT LOW MDM: CPT

## 2020-10-16 ENCOUNTER — TELEPHONE (OUTPATIENT)
Dept: CASE MANAGEMENT | Facility: HOSPITAL | Age: 60
End: 2020-10-16

## 2020-10-16 NOTE — ED PROVIDER NOTES
Patient Seen in: [facility] AND [facility] Emergency Department      History   Patient presents with:  Testing    Stated Complaint: testing, +exposure    HPI    60-year-old female with history of hypertension, hypothyroidism, here for COVID 19 testing.   Patient Negative for dysuria. Musculoskeletal: Negative for back pain. Skin: Negative for rash. Neurological: Negative for dizziness. Psychiatric/Behavioral: Negative for suicidal ideas. All other systems reviewed and are negative.       Positive for stat without intervention    The patient was informed of their elevated blood pressure reading in the Emergency Department. They were informed of the dangers of undiagnosed and untreated hypertension.   Education regarding lifestyle modifications and the need f

## 2020-10-16 NOTE — ED INITIAL ASSESSMENT (HPI)
Pt states her brother in law was recently tested for COVID-pending results. Pt lives with brother in law and is requesting a test. Denies any s/s.

## 2020-10-17 ENCOUNTER — VIRTUAL PHONE E/M (OUTPATIENT)
Dept: INTERNAL MEDICINE CLINIC | Facility: CLINIC | Age: 60
End: 2020-10-17
Payer: COMMERCIAL

## 2020-10-17 DIAGNOSIS — Z20.822 EXPOSURE TO COVID-19 VIRUS: Primary | ICD-10-CM

## 2020-10-17 PROCEDURE — 99213 OFFICE O/P EST LOW 20 MIN: CPT | Performed by: INTERNAL MEDICINE

## 2020-10-17 NOTE — PROGRESS NOTES
HPI:    Patient ID: Fanta Hernandez is a 61year old female. This is a telemedicine visit with live, interactive  audio.       Patient understands and accepts financial responsibility for any deductible, co-insurance and/or co-pays associated with this se pt has contact with the spouse of the brother in law who was tested for covid 19 result also pending. Pt's covid 19 testing still pending but she remains asymptomatic. Pt told she should remain in quarantine for 14 days.  Observe for any symptoms of covid

## 2020-10-19 ENCOUNTER — TELEPHONE (OUTPATIENT)
Dept: INTERNAL MEDICINE CLINIC | Facility: CLINIC | Age: 60
End: 2020-10-19

## 2020-10-19 DIAGNOSIS — Z12.31 VISIT FOR SCREENING MAMMOGRAM: Primary | ICD-10-CM

## 2020-10-21 ENCOUNTER — TELEPHONE (OUTPATIENT)
Dept: INTERNAL MEDICINE CLINIC | Facility: CLINIC | Age: 60
End: 2020-10-21

## 2020-10-21 NOTE — TELEPHONE ENCOUNTER
Pt called for her covid test from 10/15 she was informed not detected as below.       SARS-CoV-2 SOURCE Nares    SARS-CoV-2 by PCR Not Detected

## 2020-10-27 RX ORDER — LOSARTAN POTASSIUM 50 MG/1
TABLET ORAL
Qty: 90 TABLET | Refills: 1 | Status: SHIPPED | OUTPATIENT
Start: 2020-10-27 | End: 2021-03-23

## 2020-10-28 ENCOUNTER — OFFICE VISIT (OUTPATIENT)
Dept: OBGYN CLINIC | Facility: CLINIC | Age: 60
End: 2020-10-28
Payer: COMMERCIAL

## 2020-10-28 VITALS
DIASTOLIC BLOOD PRESSURE: 74 MMHG | WEIGHT: 194 LBS | SYSTOLIC BLOOD PRESSURE: 126 MMHG | BODY MASS INDEX: 28.73 KG/M2 | HEIGHT: 69 IN

## 2020-10-28 DIAGNOSIS — Z01.419 WOMEN'S ANNUAL ROUTINE GYNECOLOGICAL EXAMINATION: Primary | ICD-10-CM

## 2020-10-28 PROCEDURE — 3074F SYST BP LT 130 MM HG: CPT | Performed by: OBSTETRICS & GYNECOLOGY

## 2020-10-28 PROCEDURE — 99386 PREV VISIT NEW AGE 40-64: CPT | Performed by: OBSTETRICS & GYNECOLOGY

## 2020-10-28 PROCEDURE — 3078F DIAST BP <80 MM HG: CPT | Performed by: OBSTETRICS & GYNECOLOGY

## 2020-10-28 PROCEDURE — 3008F BODY MASS INDEX DOCD: CPT | Performed by: OBSTETRICS & GYNECOLOGY

## 2020-10-28 PROCEDURE — 87624 HPV HI-RISK TYP POOLED RSLT: CPT | Performed by: OBSTETRICS & GYNECOLOGY

## 2020-10-28 PROCEDURE — 88175 CYTOPATH C/V AUTO FLUID REDO: CPT | Performed by: OBSTETRICS & GYNECOLOGY

## 2020-10-28 RX ORDER — VALACYCLOVIR HYDROCHLORIDE 1 G/1
1 TABLET, FILM COATED ORAL EVERY 12 HOURS SCHEDULED
Qty: 90 TABLET | Refills: 0 | Status: SHIPPED | OUTPATIENT
Start: 2020-10-28 | End: 2021-01-26

## 2020-10-28 RX ORDER — CHLORHEXIDINE GLUCONATE 0.12 MG/ML
RINSE ORAL
COMMUNITY
Start: 2020-07-28 | End: 2021-01-13

## 2020-10-28 NOTE — PROGRESS NOTES
Lorena Siegel is here for a checkup. This is her first visit to our office. She is a 70-year-old -American female  1 para 1-0-0-1. Patient says that she went into menopause at age 54 and has not had any vaginal spotting or bleeding at all.     Pas and VF, so D/C Cosopt, but continue Latanoprost qhs OU, 9/16/17- stable IOP, so D/C Latanoprost qhs OU, 1/17/18 RESUME LATANOPROST QHS OU DUE TO IOP OF 22/26 AND PT CONCERN   • Herpes simplex 2005    vaginal   • Human papilloma virus infection    • Hypothy file    Occupational History      Not on file    Social Needs      Financial resource strain: Not on file      Food insecurity        Worry: Not on file        Inability: Not on file      Transportation needs        Medical: Not on file        Non-medical: normal  NECK: supple; no thyroidmegaly, no adenopathy  LUNGS: clear to auscultation  CARDIOVASCULAR: normal S1, S2, RRR  BREASTS: Bilaterally normal firm, nontendder, no palpable masses or nodes, no nipple discharge, no skin changes, no axillary adenopathy

## 2020-11-24 ENCOUNTER — HOSPITAL ENCOUNTER (OUTPATIENT)
Dept: MAMMOGRAPHY | Facility: HOSPITAL | Age: 60
Discharge: HOME | End: 2020-11-24
Attending: INTERNAL MEDICINE
Payer: COMMERCIAL

## 2020-11-24 DIAGNOSIS — Z12.31 VISIT FOR SCREENING MAMMOGRAM: ICD-10-CM

## 2020-11-24 PROCEDURE — 77067 SCR MAMMO BI INCL CAD: CPT | Performed by: INTERNAL MEDICINE

## 2020-11-24 PROCEDURE — 77063 BREAST TOMOSYNTHESIS BI: CPT | Performed by: INTERNAL MEDICINE

## 2020-12-16 RX ORDER — HYDROCHLOROTHIAZIDE 12.5 MG/1
TABLET ORAL
Qty: 90 TABLET | Refills: 1 | Status: SHIPPED | OUTPATIENT
Start: 2020-12-16 | End: 2021-03-23

## 2020-12-26 RX ORDER — LEVOTHYROXINE SODIUM 88 UG/1
TABLET ORAL
Qty: 90 TABLET | Refills: 1 | Status: SHIPPED | OUTPATIENT
Start: 2020-12-26 | End: 2021-06-24

## 2021-01-13 ENCOUNTER — OFFICE VISIT (OUTPATIENT)
Dept: FAMILY MEDICINE CLINIC | Facility: CLINIC | Age: 61
End: 2021-01-13

## 2021-01-13 VITALS
TEMPERATURE: 98 F | DIASTOLIC BLOOD PRESSURE: 82 MMHG | HEIGHT: 69 IN | WEIGHT: 200 LBS | SYSTOLIC BLOOD PRESSURE: 115 MMHG | HEART RATE: 60 BPM | BODY MASS INDEX: 29.62 KG/M2

## 2021-01-13 DIAGNOSIS — E55.9 VITAMIN D DEFICIENCY: ICD-10-CM

## 2021-01-13 DIAGNOSIS — I10 HTN (HYPERTENSION), BENIGN: ICD-10-CM

## 2021-01-13 DIAGNOSIS — Z00.00 WELL ADULT EXAM: Primary | ICD-10-CM

## 2021-01-13 DIAGNOSIS — E03.9 HYPOTHYROIDISM, UNSPECIFIED TYPE: ICD-10-CM

## 2021-01-13 DIAGNOSIS — E66.3 OVERWEIGHT (BMI 25.0-29.9): ICD-10-CM

## 2021-01-13 PROCEDURE — 99386 PREV VISIT NEW AGE 40-64: CPT | Performed by: FAMILY MEDICINE

## 2021-01-13 PROCEDURE — 3079F DIAST BP 80-89 MM HG: CPT | Performed by: FAMILY MEDICINE

## 2021-01-13 PROCEDURE — 3074F SYST BP LT 130 MM HG: CPT | Performed by: FAMILY MEDICINE

## 2021-01-13 PROCEDURE — 3008F BODY MASS INDEX DOCD: CPT | Performed by: FAMILY MEDICINE

## 2021-01-13 NOTE — PROGRESS NOTES
HPI:    Patient ID: Kenia Abraham is a 61year old female.     HPI  Patient presents with:  Establish Care: sees gyne   Routine Physical    Patient new to me to establish care and here for physical.  She states she is a  for [employer] 10/01/2013 (Within Weeks)   BMI 29.53 kg/m²     Past Medical History:   Diagnosis Date   • Essential hypertension    • Fibroids    • Genital herpes simplex    • Glaucoma 2006 6/20/17 1st visit- was on Cosopt bid OU and Latanoprost qhs OU- 6/17/17 normal Not on file        Active member of club or organization: Not on file        Attends meetings of clubs or organizations: Not on file        Relationship status: Not on file      Intimate partner violence        Fear of current or ex partner: Not on file Outpatient Medications   Medication Sig Dispense Refill   • Levothyroxine Sodium (SYNTHROID) 88 MCG Oral Tab TAKE 1 TABLET BEFORE       BREAKFAST 90 tablet 1   • HYDROCHLOROTHIAZIDE 12.5 MG Oral Tab TAKE 1 TABLET DAILY 90 tablet 1   • valACYclovir HCl 1 G physicals  Follow up with dentist every 6 months  Follow up with eye doctor yearly  Recommend aerobic exercise for at least 30mins 5 days a week  Yearly flu shot  Tetanus booster every 10 years (Tdap/ Td)  Labs ordered/ or reviewed if done prior to appoint

## 2021-01-14 ENCOUNTER — PATIENT MESSAGE (OUTPATIENT)
Dept: FAMILY MEDICINE CLINIC | Facility: CLINIC | Age: 61
End: 2021-01-14

## 2021-01-15 NOTE — TELEPHONE ENCOUNTER
From: Jocelin Saldaña  To: 59 Aguilar Anderson MD  Sent: 1/14/2021 8:57 PM CST  Subject: Non-Urgent Medical Question    I Would like to make an appointment for the shingles vaccines.    thank you.   c [phone]

## 2021-01-23 ENCOUNTER — LAB ENCOUNTER (OUTPATIENT)
Dept: LAB | Facility: HOSPITAL | Age: 61
End: 2021-01-23
Attending: FAMILY MEDICINE
Payer: COMMERCIAL

## 2021-01-23 DIAGNOSIS — Z00.00 WELL ADULT EXAM: ICD-10-CM

## 2021-01-23 DIAGNOSIS — E55.9 VITAMIN D DEFICIENCY: ICD-10-CM

## 2021-01-23 DIAGNOSIS — E03.9 HYPOTHYROIDISM, UNSPECIFIED TYPE: ICD-10-CM

## 2021-01-23 LAB
ALBUMIN SERPL-MCNC: 3.5 G/DL (ref 3.4–5)
ALBUMIN/GLOB SERPL: 0.9 {RATIO} (ref 1–2)
ALP LIVER SERPL-CCNC: 67 U/L
ALT SERPL-CCNC: 18 U/L
ANION GAP SERPL CALC-SCNC: 5 MMOL/L (ref 0–18)
AST SERPL-CCNC: 15 U/L (ref 15–37)
BASOPHILS # BLD AUTO: 0.06 X10(3) UL (ref 0–0.2)
BASOPHILS NFR BLD AUTO: 1.9 %
BILIRUB SERPL-MCNC: 0.5 MG/DL (ref 0.1–2)
BUN BLD-MCNC: 14 MG/DL (ref 7–18)
BUN/CREAT SERPL: 14.1 (ref 10–20)
CALCIUM BLD-MCNC: 9.1 MG/DL (ref 8.5–10.1)
CHLORIDE SERPL-SCNC: 108 MMOL/L (ref 98–112)
CHOLEST SMN-MCNC: 142 MG/DL (ref ?–200)
CO2 SERPL-SCNC: 28 MMOL/L (ref 21–32)
CREAT BLD-MCNC: 0.99 MG/DL
DEPRECATED RDW RBC AUTO: 44.9 FL (ref 35.1–46.3)
EOSINOPHIL # BLD AUTO: 0.09 X10(3) UL (ref 0–0.7)
EOSINOPHIL NFR BLD AUTO: 2.8 %
ERYTHROCYTE [DISTWIDTH] IN BLOOD BY AUTOMATED COUNT: 13.4 % (ref 11–15)
GLOBULIN PLAS-MCNC: 4 G/DL (ref 2.8–4.4)
GLUCOSE BLD-MCNC: 78 MG/DL (ref 70–99)
HCT VFR BLD AUTO: 39.4 %
HDLC SERPL-MCNC: 64 MG/DL (ref 40–59)
HGB BLD-MCNC: 12.9 G/DL
IMM GRANULOCYTES # BLD AUTO: 0.01 X10(3) UL (ref 0–1)
IMM GRANULOCYTES NFR BLD: 0.3 %
LDLC SERPL CALC-MCNC: 66 MG/DL (ref ?–100)
LYMPHOCYTES # BLD AUTO: 1.82 X10(3) UL (ref 1–4)
LYMPHOCYTES NFR BLD AUTO: 56.9 %
M PROTEIN MFR SERPL ELPH: 7.5 G/DL (ref 6.4–8.2)
MCH RBC QN AUTO: 29.8 PG (ref 26–34)
MCHC RBC AUTO-ENTMCNC: 32.7 G/DL (ref 31–37)
MCV RBC AUTO: 91 FL
MONOCYTES # BLD AUTO: 0.24 X10(3) UL (ref 0.1–1)
MONOCYTES NFR BLD AUTO: 7.5 %
NEUTROPHILS # BLD AUTO: 0.98 X10 (3) UL (ref 1.5–7.7)
NEUTROPHILS # BLD AUTO: 0.98 X10(3) UL (ref 1.5–7.7)
NEUTROPHILS NFR BLD AUTO: 30.6 %
NONHDLC SERPL-MCNC: 78 MG/DL (ref ?–130)
OSMOLALITY SERPL CALC.SUM OF ELEC: 291 MOSM/KG (ref 275–295)
PATIENT FASTING Y/N/NP: YES
PATIENT FASTING Y/N/NP: YES
PLATELET # BLD AUTO: 218 10(3)UL (ref 150–450)
POTASSIUM SERPL-SCNC: 3.6 MMOL/L (ref 3.5–5.1)
RBC # BLD AUTO: 4.33 X10(6)UL
SODIUM SERPL-SCNC: 141 MMOL/L (ref 136–145)
T4 FREE SERPL-MCNC: 1 NG/DL (ref 0.8–1.7)
TRIGL SERPL-MCNC: 60 MG/DL (ref 30–149)
TSI SER-ACNC: 2.66 MIU/ML (ref 0.36–3.74)
VLDLC SERPL CALC-MCNC: 12 MG/DL (ref 0–30)
WBC # BLD AUTO: 3.2 X10(3) UL (ref 4–11)

## 2021-01-23 PROCEDURE — 84443 ASSAY THYROID STIM HORMONE: CPT

## 2021-01-23 PROCEDURE — 80053 COMPREHEN METABOLIC PANEL: CPT

## 2021-01-23 PROCEDURE — 80061 LIPID PANEL: CPT

## 2021-01-23 PROCEDURE — 36415 COLL VENOUS BLD VENIPUNCTURE: CPT

## 2021-01-23 PROCEDURE — 82306 VITAMIN D 25 HYDROXY: CPT

## 2021-01-23 PROCEDURE — 85060 BLOOD SMEAR INTERPRETATION: CPT

## 2021-01-23 PROCEDURE — 85025 COMPLETE CBC W/AUTO DIFF WBC: CPT

## 2021-01-23 PROCEDURE — 84439 ASSAY OF FREE THYROXINE: CPT

## 2021-01-25 LAB — 25(OH)D3 SERPL-MCNC: 29.9 NG/ML (ref 30–100)

## 2021-02-03 ENCOUNTER — OFFICE VISIT (OUTPATIENT)
Dept: FAMILY MEDICINE CLINIC | Facility: CLINIC | Age: 61
End: 2021-02-03
Payer: COMMERCIAL

## 2021-02-03 VITALS
TEMPERATURE: 97 F | HEIGHT: 69 IN | WEIGHT: 206 LBS | BODY MASS INDEX: 30.51 KG/M2 | SYSTOLIC BLOOD PRESSURE: 140 MMHG | DIASTOLIC BLOOD PRESSURE: 85 MMHG | HEART RATE: 61 BPM

## 2021-02-03 DIAGNOSIS — R03.0 ELEVATED BLOOD PRESSURE READING: ICD-10-CM

## 2021-02-03 DIAGNOSIS — Z23 NEED FOR SHINGLES VACCINE: ICD-10-CM

## 2021-02-03 DIAGNOSIS — D70.8 OTHER NEUTROPENIA (HCC): Primary | ICD-10-CM

## 2021-02-03 PROCEDURE — 90471 IMMUNIZATION ADMIN: CPT | Performed by: FAMILY MEDICINE

## 2021-02-03 PROCEDURE — 90750 HZV VACC RECOMBINANT IM: CPT | Performed by: FAMILY MEDICINE

## 2021-02-03 PROCEDURE — 3077F SYST BP >= 140 MM HG: CPT | Performed by: FAMILY MEDICINE

## 2021-02-03 PROCEDURE — 99213 OFFICE O/P EST LOW 20 MIN: CPT | Performed by: FAMILY MEDICINE

## 2021-02-03 PROCEDURE — 3008F BODY MASS INDEX DOCD: CPT | Performed by: FAMILY MEDICINE

## 2021-02-03 PROCEDURE — 3079F DIAST BP 80-89 MM HG: CPT | Performed by: FAMILY MEDICINE

## 2021-02-03 NOTE — PATIENT INSTRUCTIONS
Low-Salt Diet  This diet removes foods that are high in salt. It also limits the amount of salt you use when cooking. It is most often used for people with high blood pressure, fluid retention (edema), and kidney, liver, or heart disease.    Table salt ha coffee, fizzy (carbonated) drinks, juices   Avoid: Flavored coffees, electrolyte replacement drinks, sports drinks   Meats  OK:  All fresh meat, fish, poultry, low-salt tuna, eggs, egg substitute   Avoid: Smoked, pickled, brine-cured, or salted meats and fi

## 2021-02-03 NOTE — PROGRESS NOTES
HPI:    Patient ID: Danay Huang is a 61year old female. HPI  Patient presents with:  Lab Results: follow up   Immunization/Injection: shingles vaccine     Patient here to review blood work. Overall she states she feels well.   She is currently not w [facility])  Chronic  Has seen hematology in past     2.  Elevated blood pressure reading  Take medication as directed  Low salt diet (less than 2.5 grams/2500mg's)  Exercise for 30mins most days of the week  Wt loss:   Buy blood pressure cuff  Take blood pressur

## 2021-03-23 ENCOUNTER — OFFICE VISIT (OUTPATIENT)
Dept: FAMILY MEDICINE CLINIC | Facility: CLINIC | Age: 61
End: 2021-03-23

## 2021-03-23 VITALS
TEMPERATURE: 99 F | SYSTOLIC BLOOD PRESSURE: 120 MMHG | BODY MASS INDEX: 31.25 KG/M2 | WEIGHT: 211 LBS | DIASTOLIC BLOOD PRESSURE: 78 MMHG | HEIGHT: 69 IN | HEART RATE: 74 BPM

## 2021-03-23 DIAGNOSIS — I10 HTN (HYPERTENSION), BENIGN: Primary | ICD-10-CM

## 2021-03-23 DIAGNOSIS — E66.9 OBESITY (BMI 30.0-34.9): ICD-10-CM

## 2021-03-23 PROBLEM — E66.811 OBESITY (BMI 30.0-34.9): Status: ACTIVE | Noted: 2021-03-23

## 2021-03-23 PROCEDURE — 3074F SYST BP LT 130 MM HG: CPT | Performed by: FAMILY MEDICINE

## 2021-03-23 PROCEDURE — 3008F BODY MASS INDEX DOCD: CPT | Performed by: FAMILY MEDICINE

## 2021-03-23 PROCEDURE — 3078F DIAST BP <80 MM HG: CPT | Performed by: FAMILY MEDICINE

## 2021-03-23 PROCEDURE — 99213 OFFICE O/P EST LOW 20 MIN: CPT | Performed by: FAMILY MEDICINE

## 2021-03-23 RX ORDER — HYDROCHLOROTHIAZIDE 12.5 MG/1
12.5 TABLET ORAL DAILY
Qty: 90 TABLET | Refills: 3 | Status: SHIPPED | OUTPATIENT
Start: 2021-03-23 | End: 2021-05-17

## 2021-03-23 RX ORDER — LOSARTAN POTASSIUM 50 MG/1
50 TABLET ORAL DAILY
Qty: 90 TABLET | Refills: 3 | Status: SHIPPED | OUTPATIENT
Start: 2021-03-23 | End: 2021-05-17

## 2021-03-23 NOTE — PROGRESS NOTES
HPI:    Patient ID: Lizy Alvares is a 61year old female. HPI  Patient presents with:  Blood Pressure: follow up     Patient currently not working for September. She was in the air . She states she has gained some weight.   Denies any neck supple. Skin:     General: Skin is warm and dry. Findings: No rash. Neurological:      Mental Status: She is alert and oriented to person, place, and time.                 ASSESSMENT/PLAN:   Htn (hypertension), benign  (primary encounter diagn

## 2021-03-29 DIAGNOSIS — I10 HTN (HYPERTENSION), BENIGN: ICD-10-CM

## 2021-03-31 DIAGNOSIS — I10 HTN (HYPERTENSION), BENIGN: ICD-10-CM

## 2021-03-31 RX ORDER — LOSARTAN POTASSIUM 50 MG/1
50 TABLET ORAL DAILY
Qty: 90 TABLET | Refills: 3 | OUTPATIENT
Start: 2021-03-31

## 2021-04-05 RX ORDER — LOSARTAN POTASSIUM 50 MG/1
50 TABLET ORAL DAILY
Qty: 90 TABLET | Refills: 3 | OUTPATIENT
Start: 2021-04-05

## 2021-05-17 ENCOUNTER — OFFICE VISIT (OUTPATIENT)
Dept: FAMILY MEDICINE CLINIC | Facility: CLINIC | Age: 61
End: 2021-05-17
Payer: COMMERCIAL

## 2021-05-17 VITALS
HEART RATE: 80 BPM | WEIGHT: 205 LBS | BODY MASS INDEX: 30.36 KG/M2 | SYSTOLIC BLOOD PRESSURE: 119 MMHG | HEIGHT: 69 IN | DIASTOLIC BLOOD PRESSURE: 74 MMHG | TEMPERATURE: 97 F

## 2021-05-17 DIAGNOSIS — I10 HTN (HYPERTENSION), BENIGN: Primary | ICD-10-CM

## 2021-05-17 PROCEDURE — 3008F BODY MASS INDEX DOCD: CPT | Performed by: FAMILY MEDICINE

## 2021-05-17 PROCEDURE — 99212 OFFICE O/P EST SF 10 MIN: CPT | Performed by: FAMILY MEDICINE

## 2021-05-17 PROCEDURE — 90471 IMMUNIZATION ADMIN: CPT | Performed by: FAMILY MEDICINE

## 2021-05-17 PROCEDURE — 90750 HZV VACC RECOMBINANT IM: CPT | Performed by: FAMILY MEDICINE

## 2021-05-17 PROCEDURE — 3074F SYST BP LT 130 MM HG: CPT | Performed by: FAMILY MEDICINE

## 2021-05-17 PROCEDURE — 3078F DIAST BP <80 MM HG: CPT | Performed by: FAMILY MEDICINE

## 2021-05-17 RX ORDER — LOSARTAN POTASSIUM AND HYDROCHLOROTHIAZIDE 12.5; 5 MG/1; MG/1
1 TABLET ORAL DAILY
Qty: 90 TABLET | Refills: 3 | Status: SHIPPED | OUTPATIENT
Start: 2021-05-17 | End: 2022-05-12

## 2021-05-17 NOTE — PROGRESS NOTES
Switched to nurse visit   Here for second shingles vaccine. Patient also requesting her medication to be sent in as a combination pill. Prescription sent to pharmacy.

## 2021-06-23 ENCOUNTER — PATIENT MESSAGE (OUTPATIENT)
Dept: FAMILY MEDICINE CLINIC | Facility: CLINIC | Age: 61
End: 2021-06-23

## 2021-06-24 RX ORDER — LEVOTHYROXINE SODIUM 88 UG/1
TABLET ORAL
Qty: 90 TABLET | Refills: 1 | Status: SHIPPED | OUTPATIENT
Start: 2021-06-24

## 2021-06-24 NOTE — TELEPHONE ENCOUNTER
From: Lamont Garcia  To: 59 Aguilar Fox MD  Sent: 6/23/2021 8:43 PM CDT  Subject: Prescription Question    I would like to request for a refill for the following:  Levothyroxine Sodium 88 MCG Tabs   Commonly known as: Synthroid    Thank you  Frieda Vail

## 2021-06-30 ENCOUNTER — OFFICE VISIT (OUTPATIENT)
Dept: SURGERY | Facility: CLINIC | Age: 61
End: 2021-06-30
Payer: COMMERCIAL

## 2021-06-30 VITALS
BODY MASS INDEX: 30.07 KG/M2 | WEIGHT: 203 LBS | HEART RATE: 70 BPM | SYSTOLIC BLOOD PRESSURE: 118 MMHG | OXYGEN SATURATION: 99 % | DIASTOLIC BLOOD PRESSURE: 76 MMHG | HEIGHT: 68.8 IN

## 2021-06-30 DIAGNOSIS — I10 HTN (HYPERTENSION), BENIGN: Primary | ICD-10-CM

## 2021-06-30 DIAGNOSIS — E66.9 OBESITY (BMI 30.0-34.9): ICD-10-CM

## 2021-06-30 DIAGNOSIS — R63.5 WEIGHT GAIN: ICD-10-CM

## 2021-06-30 PROCEDURE — 3078F DIAST BP <80 MM HG: CPT | Performed by: INTERNAL MEDICINE

## 2021-06-30 PROCEDURE — 3008F BODY MASS INDEX DOCD: CPT | Performed by: INTERNAL MEDICINE

## 2021-06-30 PROCEDURE — 99204 OFFICE O/P NEW MOD 45 MIN: CPT | Performed by: INTERNAL MEDICINE

## 2021-06-30 PROCEDURE — 3074F SYST BP LT 130 MM HG: CPT | Performed by: INTERNAL MEDICINE

## 2021-06-30 RX ORDER — LORATADINE 10 MG/1
10 TABLET ORAL DAILY
COMMUNITY
End: 2021-10-23

## 2021-06-30 RX ORDER — PHENTERMINE HYDROCHLORIDE 15 MG/1
15 CAPSULE ORAL EVERY MORNING
Qty: 30 CAPSULE | Refills: 2 | Status: SHIPPED | OUTPATIENT
Start: 2021-06-30 | End: 2021-09-15

## 2021-06-30 NOTE — PROGRESS NOTES
[facility] Consultation Note       Patient:  Hamilton Ryder  :      1960  MRN:      [id number]    Referring Provider: Dr. Christa Bustos       Chief Complaint:  Patient presents with:  Consult: Non-surgical weight management consult  Landon Flores History      Not on file    Tobacco Use      Smoking status: Never Smoker      Smokeless tobacco: Never Used    Vaping Use      Vaping Use: Never used    Substance and Sexual Activity      Alcohol use: No        Alcohol/week: 0.0 standard drinks      Drug REMOVAL OF FALLOPIAN TUBE Bilateral 12/2013    ovaries appeared normal   • TUBAL LIGATION         Family History:    Family History   Problem Relation Age of Onset   • Other (Other) Father         liver cirrhosis   • Hypertension Mother    • Glaucoma Mothe atraumatic  Back: symmetric, no curvature. ROM normal. No CVA tenderness.   Lungs: clear to auscultation bilaterally  Heart: S1, S2 normal, no murmur, click, rub or gallop, regular rate and rhythm  Abdomen: soft, non-tender; bowel sounds normal; no masses, benign    Obesity (BMI 30.0-34. 9)    Weight gain        Duke Holter, MD

## 2021-08-04 ENCOUNTER — TELEPHONE (OUTPATIENT)
Dept: FAMILY MEDICINE CLINIC | Facility: CLINIC | Age: 61
End: 2021-08-04

## 2021-08-05 NOTE — TELEPHONE ENCOUNTER
Received forms for compression stockings as patient is  for [employer] and stands at work.   Dx states \"varicose veins\" which is not a dx listed in chart  Please verfiy if correct as to reason for stocking and if she can take photo

## 2021-08-06 NOTE — TELEPHONE ENCOUNTER
Future Appointments   Date Time Provider Sho Gutierres   8/19/2021  9:45 AM Colleen Mcginnis MD [facility]   9/15/2021 11:30 AM Portia Horowitz MD [address] [place] [address]     The patient called back and was informed of below with understanding.     Radha Graham

## 2021-08-19 ENCOUNTER — OFFICE VISIT (OUTPATIENT)
Dept: FAMILY MEDICINE CLINIC | Facility: CLINIC | Age: 61
End: 2021-08-19
Payer: COMMERCIAL

## 2021-08-19 VITALS
BODY MASS INDEX: 28.73 KG/M2 | DIASTOLIC BLOOD PRESSURE: 77 MMHG | TEMPERATURE: 97 F | HEART RATE: 76 BPM | WEIGHT: 194 LBS | SYSTOLIC BLOOD PRESSURE: 117 MMHG | HEIGHT: 68.8 IN

## 2021-08-19 DIAGNOSIS — Z78.9 PROLONGED STANDING: ICD-10-CM

## 2021-08-19 DIAGNOSIS — R09.82 POSTNASAL DRIP: ICD-10-CM

## 2021-08-19 DIAGNOSIS — I83.93 ASYMPTOMATIC VARICOSE VEINS OF BOTH LOWER EXTREMITIES: Primary | ICD-10-CM

## 2021-08-19 DIAGNOSIS — M77.8 RIGHT ELBOW TENDINITIS: ICD-10-CM

## 2021-08-19 PROCEDURE — 99214 OFFICE O/P EST MOD 30 MIN: CPT | Performed by: FAMILY MEDICINE

## 2021-08-19 PROCEDURE — 3074F SYST BP LT 130 MM HG: CPT | Performed by: FAMILY MEDICINE

## 2021-08-19 PROCEDURE — 3078F DIAST BP <80 MM HG: CPT | Performed by: FAMILY MEDICINE

## 2021-08-19 PROCEDURE — 3008F BODY MASS INDEX DOCD: CPT | Performed by: FAMILY MEDICINE

## 2021-08-19 RX ORDER — AZELASTINE 1 MG/ML
1 SPRAY, METERED NASAL 2 TIMES DAILY
Qty: 1 EACH | Refills: 1 | Status: SHIPPED | OUTPATIENT
Start: 2021-08-19 | End: 2021-09-10

## 2021-08-19 RX ORDER — HYDROCHLOROTHIAZIDE 12.5 MG/1
TABLET ORAL
COMMUNITY
Start: 2021-06-18 | End: 2021-08-19

## 2021-08-19 NOTE — PROGRESS NOTES
HPI:    Patient ID: Maggy Mayfield is a 61year old female.     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  30 capsule 2   • Levothyroxine Sodium (SYNTHROID) 88 MCG Oral Tab TAKE 1 TABLET BEFORE       BREAKFAST 90 tablet 1   • Losartan Potassium-HCTZ 50-12.5 MG Oral Tab Take 1 ta ORTHOPEDIC - INTERNAL    4. Postnasal drip    - Azelastine HCl 0.1 % Nasal Solution; 1 spray by Nasal route 2 (two) times daily. Dispense: 1 each; Refill: 1      No orders of the defined types were placed in this encounter.       Meds This Visit:  Requeste

## 2021-08-19 NOTE — PATIENT INSTRUCTIONS
Understanding Medial Epicondylitis    Several muscles attach to the arm at the elbow joint. The tough bands of tissue that attach muscle to bones are called tendons. The bone in the upper arm has knobs on the farthest end called epicondyles.  Tendons ruchi resting and treating your elbow.    When to call your healthcare provider  Call your healthcare provider right away if you have any of these:  · Fever of 100.4°F (38°C) or higher, or as directed by your provider  · Chills  · Redness, swelling, or warmth philip

## 2021-09-07 ENCOUNTER — TELEPHONE (OUTPATIENT)
Dept: PHYSICAL THERAPY | Facility: HOSPITAL | Age: 61
End: 2021-09-07

## 2021-09-10 DIAGNOSIS — R09.82 POSTNASAL DRIP: ICD-10-CM

## 2021-09-10 RX ORDER — AZELASTINE 1 MG/ML
1 SPRAY, METERED NASAL 2 TIMES DAILY
Qty: 1 EACH | Refills: 1 | Status: SHIPPED | OUTPATIENT
Start: 2021-09-10 | End: 2021-11-22

## 2021-09-10 NOTE — TELEPHONE ENCOUNTER
Refill passed per [facility] protocol. Requested Prescriptions   Pending Prescriptions Disp Refills    Azelastine HCl 0.1 % Nasal Solution [Pharmacy Med Name: AZELASTINE 0.1% (137 MCG) SPRY] 1 each 1     Si spray by Nasal route 2 (two) times daily.         Allergy Medication Protocol Passed - 9/10/2021  9:31 AM        Passed - Appoinment in past 12 or next 3 months            Recent Outpatient Visits              3 weeks ago Asymptomatic varicose veins of both lower extremities    [facility], [address] MD    Office Visit    2 months ago HTN (hypertension), carlos Frankel MD    Office Visit    3 months ago HTN (hypertension), [facility]    [facility], [address]    Office Visit    5 months ago HTN (hypertension), [facility], [address] MD    Office Visit    7 months ago Other neutropenia [facility])    Sammi Sanchez MD    Office Visit          Future Appointments         Provider Department Appt Notes    In 5 days Priscila Mustafa, [address] [address]; Live pt--90 min; Cory Rehman    In 5 days Niles Diaz MD [address], [address], [address]  NON SX F/U    In 1 week Priscila Mustafa, [address] [address]    In 2 weeks Rony Cancer, [address] [address]    In 3 weeks Rony Cancer, [address] [address]    In 3 weeks Brian Riding, PTA [address] [address]    In 4 weeks Brian Riding, PTA [address] [address]    In 1 month Brian Riding, PTA [address] [address]    In 1 month Brian Riding, PTA [address] [address]    In 1 month Brian Riding, PTA [address] [address]    In 1 month Katheryn Anna, [address] [facility]

## 2021-09-10 NOTE — TELEPHONE ENCOUNTER
Refill passed per [facility] protocol.    Requested Prescriptions   Pending Prescriptions Disp Refills    AZELASTINE HCL 0.1 % Nasal Solution [Pharmacy Med Name: AZELASTINE 0.1% (137 MCG) SPRY]  1     Sig: SPRAY 1 SPRAY INTO EACH NOSTRIL TWICE A DAY        Allergy Medication Protocol Passed - 9/10/2021  9:31 AM        Passed - Appoinment in past 12 or next 3 months            Recent Outpatient Visits              3 weeks ago Asymptomatic varicose veins of both lower extremities    [facility], [address] MD    Office Visit    2 months ago HTN (hypertension), benign    Rosamaria Hoang MD    Office Visit    3 months ago HTN (hypertension), [facility], [address] [facility] [place], [facility]    Office Visit    5 months ago HTN (hypertension), [facility], [address] MD    Office Visit    7 months ago Other neutropenia [facility])    Lauren Willard MD    Office Visit          Future Appointments         Provider Department Appt Notes    In 5 days Alex Mustafa, [address] [place]; Live pt--90 min; [place]    In 5 days Sari Knapp, [address], [address], [address] [facility]  NON SX F/U    In 1 week Alex Mustafa, [address] [place] [facility]    In 2 weeks Marli Duel, [address] [place] [facility]    In 3 weeks Alex Mustafa, [address] [place] [facility]    In 3 weeks Stanly [place], PTA [address] [place] [facility]    In 4 weeks David [place], PTA [address] [place] [facility]    In 1 month David [place], PTA [address] [place] [facility]    In 1 month Stanly [place], PTA [address] [place] [facility]    In 1 month David [place], PTA [address] [place] [facility]    In 1 month Jenna Arce, [address] [facility]

## 2021-09-13 ENCOUNTER — TELEPHONE (OUTPATIENT)
Dept: PHYSICAL THERAPY | Facility: HOSPITAL | Age: 61
End: 2021-09-13

## 2021-09-15 ENCOUNTER — APPOINTMENT (OUTPATIENT)
Dept: PHYSICAL THERAPY | Facility: HOSPITAL | Age: 61
End: 2021-09-15
Attending: FAMILY MEDICINE
Payer: COMMERCIAL

## 2021-09-15 ENCOUNTER — OFFICE VISIT (OUTPATIENT)
Dept: SURGERY | Facility: CLINIC | Age: 61
End: 2021-09-15
Payer: COMMERCIAL

## 2021-09-15 VITALS
BODY MASS INDEX: 28.81 KG/M2 | DIASTOLIC BLOOD PRESSURE: 70 MMHG | WEIGHT: 194.5 LBS | HEART RATE: 60 BPM | OXYGEN SATURATION: 100 % | SYSTOLIC BLOOD PRESSURE: 120 MMHG | HEIGHT: 68.8 IN

## 2021-09-15 DIAGNOSIS — E66.9 OBESITY (BMI 30.0-34.9): ICD-10-CM

## 2021-09-15 DIAGNOSIS — F43.9 STRESS: ICD-10-CM

## 2021-09-15 DIAGNOSIS — Z51.81 ENCOUNTER FOR THERAPEUTIC DRUG MONITORING: ICD-10-CM

## 2021-09-15 DIAGNOSIS — I10 HTN (HYPERTENSION), BENIGN: Primary | ICD-10-CM

## 2021-09-15 PROCEDURE — 3078F DIAST BP <80 MM HG: CPT | Performed by: INTERNAL MEDICINE

## 2021-09-15 PROCEDURE — 99214 OFFICE O/P EST MOD 30 MIN: CPT | Performed by: INTERNAL MEDICINE

## 2021-09-15 PROCEDURE — 3008F BODY MASS INDEX DOCD: CPT | Performed by: INTERNAL MEDICINE

## 2021-09-15 PROCEDURE — 3074F SYST BP LT 130 MM HG: CPT | Performed by: INTERNAL MEDICINE

## 2021-09-15 RX ORDER — PHENTERMINE HYDROCHLORIDE 15 MG/1
15 CAPSULE ORAL EVERY MORNING
Qty: 30 CAPSULE | Refills: 2 | Status: SHIPPED | OUTPATIENT
Start: 2021-09-15 | End: 2021-10-15

## 2021-09-15 NOTE — PROGRESS NOTES
[address] [address]  [address] [facility] [address]  Dept: [phone]       Patient:  Subha Vasquez  :      1960  MRN:      [id number]    Chief Complaint:  Patient prese HCl 15 MG Oral Cap Take 1 capsule (15 mg total) by mouth every morning.  30 capsule 2   • Levothyroxine Sodium (SYNTHROID) 88 MCG Oral Tab TAKE 1 TABLET BEFORE       BREAKFAST 90 tablet 1   • Losartan Potassium-HCTZ 50-12.5 MG Oral Tab Take 1 tablet by mout Activity:       Days of Exercise per Week: Not on file      Minutes of Exercise per Session: Not on file  Stress:       Feeling of Stress : Not on file  Social Connections:       Frequency of Communication with Friends and Family: Not on file      Frequenc following:  · Eats 3 meal(s) per day  · Length of time it takes to consume a meal:  20  · # of snacks per day: 1 Type of snacks:  Gummies, fruit  · Amount of soda consumption per day:    · Amount of water (in ounces) per day:  64  · Drinking between meals Diagnosis(ses):   Htn (hypertension), benign  (primary encounter diagnosis)  Obesity (bmi 30.0-34. 9)  Encounter for therapeutic drug monitoring  Stress    PLAN     Patient is not interested in bariatric surgery.  Patient desires to pursue traditional weight

## 2021-09-20 ENCOUNTER — TELEPHONE (OUTPATIENT)
Dept: FAMILY MEDICINE CLINIC | Facility: CLINIC | Age: 61
End: 2021-09-20

## 2021-09-20 NOTE — TELEPHONE ENCOUNTER
Received call from [address] [facility] with [address] [facility] [phone] (option #2 for immediate assistance), regarding Order Number [id number].  Also spoke with [address] [place] [address],  Pharmacist.     They have hydrochlorothiazide 12.5mg on file AND lorsartan-hydrochloro

## 2021-09-22 ENCOUNTER — APPOINTMENT (OUTPATIENT)
Dept: PHYSICAL THERAPY | Facility: HOSPITAL | Age: 61
End: 2021-09-22
Attending: FAMILY MEDICINE
Payer: COMMERCIAL

## 2021-09-28 ENCOUNTER — APPOINTMENT (OUTPATIENT)
Dept: PHYSICAL THERAPY | Facility: HOSPITAL | Age: 61
End: 2021-09-28
Attending: FAMILY MEDICINE
Payer: COMMERCIAL

## 2021-10-01 ENCOUNTER — APPOINTMENT (OUTPATIENT)
Dept: PHYSICAL THERAPY | Facility: HOSPITAL | Age: 61
End: 2021-10-01
Attending: FAMILY MEDICINE
Payer: COMMERCIAL

## 2021-10-06 ENCOUNTER — APPOINTMENT (OUTPATIENT)
Dept: PHYSICAL THERAPY | Facility: HOSPITAL | Age: 61
End: 2021-10-06
Payer: COMMERCIAL

## 2021-10-07 DIAGNOSIS — R09.82 POSTNASAL DRIP: ICD-10-CM

## 2021-10-07 RX ORDER — AZELASTINE 1 MG/ML
SPRAY, METERED NASAL
Refills: 1 | OUTPATIENT
Start: 2021-10-07

## 2021-10-08 ENCOUNTER — APPOINTMENT (OUTPATIENT)
Dept: PHYSICAL THERAPY | Facility: HOSPITAL | Age: 61
End: 2021-10-08
Payer: COMMERCIAL

## 2021-10-13 ENCOUNTER — APPOINTMENT (OUTPATIENT)
Dept: PHYSICAL THERAPY | Facility: HOSPITAL | Age: 61
End: 2021-10-13
Payer: COMMERCIAL

## 2021-10-15 ENCOUNTER — APPOINTMENT (OUTPATIENT)
Dept: PHYSICAL THERAPY | Facility: HOSPITAL | Age: 61
End: 2021-10-15
Payer: COMMERCIAL

## 2021-10-20 ENCOUNTER — APPOINTMENT (OUTPATIENT)
Dept: PHYSICAL THERAPY | Facility: HOSPITAL | Age: 61
End: 2021-10-20
Payer: COMMERCIAL

## 2021-10-23 ENCOUNTER — OFFICE VISIT (OUTPATIENT)
Dept: FAMILY MEDICINE CLINIC | Facility: CLINIC | Age: 61
End: 2021-10-23
Payer: COMMERCIAL

## 2021-10-23 VITALS
WEIGHT: 193 LBS | TEMPERATURE: 98 F | BODY MASS INDEX: 28.58 KG/M2 | DIASTOLIC BLOOD PRESSURE: 81 MMHG | SYSTOLIC BLOOD PRESSURE: 118 MMHG | HEART RATE: 61 BPM | HEIGHT: 68.8 IN

## 2021-10-23 DIAGNOSIS — L65.9 HAIR THINNING: Primary | ICD-10-CM

## 2021-10-23 PROCEDURE — 3008F BODY MASS INDEX DOCD: CPT | Performed by: FAMILY MEDICINE

## 2021-10-23 PROCEDURE — 99213 OFFICE O/P EST LOW 20 MIN: CPT | Performed by: FAMILY MEDICINE

## 2021-10-23 PROCEDURE — 3074F SYST BP LT 130 MM HG: CPT | Performed by: FAMILY MEDICINE

## 2021-10-23 PROCEDURE — 3079F DIAST BP 80-89 MM HG: CPT | Performed by: FAMILY MEDICINE

## 2021-10-23 PROCEDURE — 90471 IMMUNIZATION ADMIN: CPT | Performed by: FAMILY MEDICINE

## 2021-10-23 PROCEDURE — 90686 IIV4 VACC NO PRSV 0.5 ML IM: CPT | Performed by: FAMILY MEDICINE

## 2021-10-23 RX ORDER — PHENTERMINE HYDROCHLORIDE 15 MG/1
CAPSULE ORAL
COMMUNITY
Start: 2021-06-30 | End: 2021-11-22

## 2021-10-23 RX ORDER — HYDROCHLOROTHIAZIDE 12.5 MG/1
TABLET ORAL
COMMUNITY
Start: 2021-09-16 | End: 2021-10-23

## 2021-10-23 NOTE — PROGRESS NOTES
HPI:    Patient ID: Aysha Zavala is a 61year old female. HPI  Patient presents with:   Other: pt would like to have her hormones check sometimes feels thomas and sometimes unable to sleep well   Referral: for dermatology for hair loss    Wt Readings fr Laterality Date   • COLPOSCOPY, CERVIX W/UPPER ADJACENT VAGINA; W/BIOPSY(S), CERVIX     • EXAM OF VAGINA,COLPOSCOPY      positive HPV   •      • REMOVAL OF FALLOPIAN TUBE Bilateral 2013    ovaries appeared normal   • TUBAL LIGATION       Soc Not on file      Frequency of Social Gatherings with Friends and Family: Not on file      Attends Judaism Services: Not on file      Active Member of Clubs or Organizations: Not on file      Attends Club or Organization Meetings: Not on file      Marital EVERY DAY IN THE MORNING *NOT COVERED BY INSURANCE*     • Azelastine HCl 0.1 % Nasal Solution 1 spray by Nasal route 2 (two) times daily.  1 each 1   • Levothyroxine Sodium (SYNTHROID) 88 MCG Oral Tab TAKE 1 TABLET BEFORE       BREAKFAST 90 tablet 1   • Los

## 2021-10-25 ENCOUNTER — APPOINTMENT (OUTPATIENT)
Dept: PHYSICAL THERAPY | Facility: HOSPITAL | Age: 61
End: 2021-10-25
Payer: COMMERCIAL

## 2021-11-04 ENCOUNTER — TELEPHONE (OUTPATIENT)
Dept: FAMILY MEDICINE CLINIC | Facility: CLINIC | Age: 61
End: 2021-11-04

## 2021-11-04 DIAGNOSIS — Z12.31 ENCOUNTER FOR SCREENING MAMMOGRAM FOR MALIGNANT NEOPLASM OF BREAST: Primary | ICD-10-CM

## 2021-11-08 ENCOUNTER — TELEPHONE (OUTPATIENT)
Dept: FAMILY MEDICINE CLINIC | Facility: CLINIC | Age: 61
End: 2021-11-08

## 2021-11-08 NOTE — TELEPHONE ENCOUNTER
Karmen Yu,  This is Justin Zamora from the [address] [place] [address] wanted to inform you that your mammogram order is in the system.  Central Scheduling phone number is [phone]

## 2021-11-22 ENCOUNTER — OFFICE VISIT (OUTPATIENT)
Dept: OBGYN CLINIC | Facility: CLINIC | Age: 61
End: 2021-11-22
Payer: COMMERCIAL

## 2021-11-22 VITALS
DIASTOLIC BLOOD PRESSURE: 70 MMHG | HEIGHT: 68.8 IN | WEIGHT: 191 LBS | SYSTOLIC BLOOD PRESSURE: 110 MMHG | BODY MASS INDEX: 28.29 KG/M2

## 2021-11-22 DIAGNOSIS — N89.8 VAGINAL DRYNESS: ICD-10-CM

## 2021-11-22 DIAGNOSIS — Z01.419 WOMEN'S ANNUAL ROUTINE GYNECOLOGICAL EXAMINATION: Primary | ICD-10-CM

## 2021-11-22 DIAGNOSIS — N95.1 MENOPAUSAL SYMPTOMS: ICD-10-CM

## 2021-11-22 LAB
CYTOLOGY CVX/VAG DOC THIN PREP: NORMAL
HPV16+18+45 E6+E7MRNA CVX NAA+PROBE: NEGATIVE

## 2021-11-22 PROCEDURE — 3078F DIAST BP <80 MM HG: CPT | Performed by: OBSTETRICS & GYNECOLOGY

## 2021-11-22 PROCEDURE — 99396 PREV VISIT EST AGE 40-64: CPT | Performed by: OBSTETRICS & GYNECOLOGY

## 2021-11-22 PROCEDURE — 87624 HPV HI-RISK TYP POOLED RSLT: CPT | Performed by: OBSTETRICS & GYNECOLOGY

## 2021-11-22 PROCEDURE — 99213 OFFICE O/P EST LOW 20 MIN: CPT | Performed by: OBSTETRICS & GYNECOLOGY

## 2021-11-22 PROCEDURE — 3008F BODY MASS INDEX DOCD: CPT | Performed by: OBSTETRICS & GYNECOLOGY

## 2021-11-22 PROCEDURE — 3074F SYST BP LT 130 MM HG: CPT | Performed by: OBSTETRICS & GYNECOLOGY

## 2021-11-22 PROCEDURE — 88175 CYTOPATH C/V AUTO FLUID REDO: CPT | Performed by: OBSTETRICS & GYNECOLOGY

## 2021-11-22 NOTE — PROGRESS NOTES
GYN ANNUAL    2021  10:51 AM    Patient presents with: Annual: last pap smear 10/15/2018 wnl  Vaginal Problem: dryness  Other: per patient, menopause 2013 and having more symptoms  .     HPI: Patient is a 61year old  LMP 2013 previous patient [facility])     saw hematologist 2 years ago, told was nl, from family    • Obesity (BMI 30-39. 9)    • Vaginal dryness, menopausal      Past Surgical History:   Procedure Laterality Date   • COLPOSCOPY, CERVIX W/UPPER ADJACENT VAGINA; W/BIOPSY(S), CERVIX     • E Genitalia: normal, no lesions, good perineal support  Urethra: meatus normal  Bladder: well supported  Vagina: normal mucosa, no lesions, no discharge   Uterus: normal size, mobile, nontender  Cervix: - normal os, no lesions or bleeding  Adnexa: normal siz

## 2021-12-02 DIAGNOSIS — R09.82 POSTNASAL DRIP: ICD-10-CM

## 2021-12-02 RX ORDER — AZELASTINE 1 MG/ML
1 SPRAY, METERED NASAL 2 TIMES DAILY
Qty: 1 EACH | Refills: 1 | Status: SHIPPED | OUTPATIENT
Start: 2021-12-02 | End: 2022-01-04

## 2021-12-15 ENCOUNTER — OFFICE VISIT (OUTPATIENT)
Dept: SURGERY | Facility: CLINIC | Age: 61
End: 2021-12-15
Payer: COMMERCIAL

## 2021-12-15 ENCOUNTER — LAB ENCOUNTER (OUTPATIENT)
Dept: LAB | Facility: HOSPITAL | Age: 61
End: 2021-12-15
Attending: INTERNAL MEDICINE
Payer: COMMERCIAL

## 2021-12-15 VITALS
DIASTOLIC BLOOD PRESSURE: 73 MMHG | SYSTOLIC BLOOD PRESSURE: 122 MMHG | HEIGHT: 68 IN | BODY MASS INDEX: 28.64 KG/M2 | WEIGHT: 189 LBS | OXYGEN SATURATION: 99 % | HEART RATE: 71 BPM

## 2021-12-15 DIAGNOSIS — I10 HTN (HYPERTENSION), BENIGN: Primary | ICD-10-CM

## 2021-12-15 DIAGNOSIS — I10 HTN (HYPERTENSION), BENIGN: ICD-10-CM

## 2021-12-15 DIAGNOSIS — E66.9 OBESITY (BMI 30.0-34.9): ICD-10-CM

## 2021-12-15 DIAGNOSIS — E66.3 OVERWEIGHT (BMI 25.0-29.9): ICD-10-CM

## 2021-12-15 DIAGNOSIS — Z51.81 ENCOUNTER FOR THERAPEUTIC DRUG MONITORING: ICD-10-CM

## 2021-12-15 DIAGNOSIS — F43.9 STRESS: ICD-10-CM

## 2021-12-15 PROCEDURE — 3078F DIAST BP <80 MM HG: CPT | Performed by: INTERNAL MEDICINE

## 2021-12-15 PROCEDURE — 93010 ELECTROCARDIOGRAM REPORT: CPT | Performed by: INTERNAL MEDICINE

## 2021-12-15 PROCEDURE — 93005 ELECTROCARDIOGRAM TRACING: CPT

## 2021-12-15 PROCEDURE — 3008F BODY MASS INDEX DOCD: CPT | Performed by: INTERNAL MEDICINE

## 2021-12-15 PROCEDURE — 3074F SYST BP LT 130 MM HG: CPT | Performed by: INTERNAL MEDICINE

## 2021-12-15 PROCEDURE — 82607 VITAMIN B-12: CPT

## 2021-12-15 PROCEDURE — 36415 COLL VENOUS BLD VENIPUNCTURE: CPT

## 2021-12-15 PROCEDURE — 99214 OFFICE O/P EST MOD 30 MIN: CPT | Performed by: INTERNAL MEDICINE

## 2021-12-15 PROCEDURE — 82397 CHEMILUMINESCENT ASSAY: CPT

## 2021-12-15 RX ORDER — PHENTERMINE HYDROCHLORIDE 15 MG/1
15 CAPSULE ORAL EVERY MORNING
Qty: 30 CAPSULE | Refills: 2 | Status: SHIPPED | OUTPATIENT
Start: 2021-12-15 | End: 2022-01-14

## 2021-12-15 NOTE — PROGRESS NOTES
[address], [place] [address]  [address] [facility] [address]  Dept: [phone]       Patient:  Lauryn Godinez  :      1960  MRN:      [id number]    Chief Complaint:  Patient prese Sodium (SYNTHROID) 88 MCG Oral Tab TAKE 1 TABLET BEFORE       BREAKFAST 90 tablet 1   • Losartan Potassium-HCTZ 50-12.5 MG Oral Tab Take 1 tablet by mouth daily.  (Patient taking differently: Take 1 tablet by mouth daily.) 90 tablet 3   • latanoprost 0.005 liver cirrhosis   • Hypertension Mother    • Glaucoma Mother    • Heart Disorder Mother    • Other (Other) Mother    • Glaucoma Sister    • Other (Other) Sister         Colon polyps   • Heart Disorder Sister         stent placement   • Diabetes Neg    • Ma obese  Head: Normocephalic, without obvious abnormality, atraumatic  Back: symmetric, no curvature. ROM normal. No CVA tenderness.   Lungs: clear to auscultation bilaterally  Heart: S1, S2 normal, no murmur, click, rub or gallop, regular rate and rhythm  Ab

## 2021-12-20 ENCOUNTER — HOSPITAL ENCOUNTER (OUTPATIENT)
Dept: MAMMOGRAPHY | Facility: HOSPITAL | Age: 61
Discharge: HOME | End: 2021-12-20
Attending: FAMILY MEDICINE
Payer: COMMERCIAL

## 2021-12-20 DIAGNOSIS — Z12.31 ENCOUNTER FOR SCREENING MAMMOGRAM FOR MALIGNANT NEOPLASM OF BREAST: ICD-10-CM

## 2021-12-20 PROCEDURE — 77063 BREAST TOMOSYNTHESIS BI: CPT | Performed by: FAMILY MEDICINE

## 2021-12-20 PROCEDURE — 77067 SCR MAMMO BI INCL CAD: CPT | Performed by: FAMILY MEDICINE

## 2021-12-21 DIAGNOSIS — R92.2 INCONCLUSIVE MAMMOGRAM: Primary | ICD-10-CM

## 2021-12-22 ENCOUNTER — PATIENT MESSAGE (OUTPATIENT)
Dept: OBGYN CLINIC | Facility: CLINIC | Age: 61
End: 2021-12-22

## 2021-12-22 RX ORDER — VALACYCLOVIR HYDROCHLORIDE 1 G/1
1000 TABLET, FILM COATED ORAL EVERY 12 HOURS
Qty: 90 TABLET | Refills: 0 | Status: SHIPPED | OUTPATIENT
Start: 2021-12-22

## 2021-12-22 NOTE — TELEPHONE ENCOUNTER
LOV: 21 with EB  Herpes simplex       vaginal     Last Refill:  Medication Quantity Refills Start End   valACYclovir HCl 1 G Oral Tab () 90 tablet 0 10/28/2020 2021   Sig:   Take 1 tablet (1,000 mg total) by mouth every 12 (twelve) ho

## 2021-12-27 ENCOUNTER — TELEPHONE (OUTPATIENT)
Dept: OBGYN CLINIC | Facility: CLINIC | Age: 61
End: 2021-12-27

## 2021-12-27 NOTE — TELEPHONE ENCOUNTER
Olinda Phelps at [address] [facility] calling to confirm reason for medication:    valACYclovir 1 G Oral Tab    Asking if medication is being used due to a Flare up or intermittent use only? States that if for intermittent use, 60 pills may suffice.  Requesting call back to

## 2022-01-04 DIAGNOSIS — R09.82 POSTNASAL DRIP: ICD-10-CM

## 2022-01-04 RX ORDER — AZELASTINE 1 MG/ML
1 SPRAY, METERED NASAL 2 TIMES DAILY
Qty: 1 EACH | Refills: 3 | Status: SHIPPED | OUTPATIENT
Start: 2022-01-04

## 2022-01-05 ENCOUNTER — HOSPITAL ENCOUNTER (OUTPATIENT)
Dept: MAMMOGRAPHY | Facility: HOSPITAL | Age: 62
Discharge: HOME | End: 2022-01-05
Attending: FAMILY MEDICINE
Payer: COMMERCIAL

## 2022-01-05 ENCOUNTER — HOSPITAL ENCOUNTER (OUTPATIENT)
Dept: ULTRASOUND IMAGING | Facility: HOSPITAL | Age: 62
Discharge: HOME | End: 2022-01-05
Attending: FAMILY MEDICINE
Payer: COMMERCIAL

## 2022-01-05 DIAGNOSIS — R92.2 INCONCLUSIVE MAMMOGRAM: ICD-10-CM

## 2022-01-05 PROCEDURE — 76642 ULTRASOUND BREAST LIMITED: CPT | Performed by: FAMILY MEDICINE

## 2022-01-05 PROCEDURE — 77065 DX MAMMO INCL CAD UNI: CPT | Performed by: FAMILY MEDICINE

## 2022-01-05 PROCEDURE — 77061 BREAST TOMOSYNTHESIS UNI: CPT | Performed by: FAMILY MEDICINE

## 2022-02-23 ENCOUNTER — LAB ENCOUNTER (OUTPATIENT)
Dept: LAB | Facility: HOSPITAL | Age: 62
End: 2022-02-23
Attending: INTERNAL MEDICINE
Payer: COMMERCIAL

## 2022-02-23 DIAGNOSIS — E03.8 OTHER SPECIFIED HYPOTHYROIDISM: ICD-10-CM

## 2022-02-23 DIAGNOSIS — E06.5 HYPOTHYROIDISM DUE TO FIBROUS INVASIVE THYROIDITIS: Primary | ICD-10-CM

## 2022-02-23 DIAGNOSIS — E03.8 HYPOTHYROIDISM DUE TO FIBROUS INVASIVE THYROIDITIS: Primary | ICD-10-CM

## 2022-02-23 LAB
T4 FREE SERPL-MCNC: 1 NG/DL (ref 0.8–1.7)
TSI SER-ACNC: 0.37 MIU/ML (ref 0.36–3.74)

## 2022-02-23 PROCEDURE — 84439 ASSAY OF FREE THYROXINE: CPT

## 2022-02-23 PROCEDURE — 84443 ASSAY THYROID STIM HORMONE: CPT

## 2022-02-23 PROCEDURE — 36415 COLL VENOUS BLD VENIPUNCTURE: CPT

## 2022-03-18 ENCOUNTER — OFFICE VISIT (OUTPATIENT)
Dept: SURGERY | Facility: CLINIC | Age: 62
End: 2022-03-18
Payer: COMMERCIAL

## 2022-03-18 VITALS
HEART RATE: 74 BPM | HEIGHT: 68 IN | WEIGHT: 187.88 LBS | OXYGEN SATURATION: 100 % | BODY MASS INDEX: 28.47 KG/M2 | DIASTOLIC BLOOD PRESSURE: 85 MMHG | SYSTOLIC BLOOD PRESSURE: 131 MMHG

## 2022-03-18 DIAGNOSIS — I10 HTN (HYPERTENSION), BENIGN: Primary | ICD-10-CM

## 2022-03-18 DIAGNOSIS — F43.9 STRESS: ICD-10-CM

## 2022-03-18 DIAGNOSIS — E66.3 OVERWEIGHT (BMI 25.0-29.9): ICD-10-CM

## 2022-03-18 DIAGNOSIS — Z51.81 ENCOUNTER FOR THERAPEUTIC DRUG MONITORING: ICD-10-CM

## 2022-03-18 PROCEDURE — 3008F BODY MASS INDEX DOCD: CPT | Performed by: INTERNAL MEDICINE

## 2022-03-18 PROCEDURE — 3079F DIAST BP 80-89 MM HG: CPT | Performed by: INTERNAL MEDICINE

## 2022-03-18 PROCEDURE — 3075F SYST BP GE 130 - 139MM HG: CPT | Performed by: INTERNAL MEDICINE

## 2022-03-18 PROCEDURE — 99214 OFFICE O/P EST MOD 30 MIN: CPT | Performed by: INTERNAL MEDICINE

## 2022-03-18 RX ORDER — PHENTERMINE HYDROCHLORIDE 30 MG/1
30 CAPSULE ORAL EVERY MORNING
Qty: 30 CAPSULE | Refills: 2 | Status: SHIPPED | OUTPATIENT
Start: 2022-03-18

## 2022-04-08 DIAGNOSIS — I10 HTN (HYPERTENSION), BENIGN: ICD-10-CM

## 2022-04-09 RX ORDER — LOSARTAN POTASSIUM AND HYDROCHLOROTHIAZIDE 12.5; 5 MG/1; MG/1
0.5 TABLET ORAL DAILY
Qty: 45 TABLET | Refills: 0 | Status: SHIPPED | OUTPATIENT
Start: 2022-04-09 | End: 2022-11-01

## 2022-04-11 NOTE — TELEPHONE ENCOUNTER
1st attempt - [employer] message sent for patient to contact the office to schedule an appointment; see notes below.

## 2022-04-14 ENCOUNTER — OFFICE VISIT (OUTPATIENT)
Dept: FAMILY MEDICINE CLINIC | Facility: CLINIC | Age: 62
End: 2022-04-14
Payer: COMMERCIAL

## 2022-04-14 VITALS
HEIGHT: 68 IN | BODY MASS INDEX: 27.74 KG/M2 | DIASTOLIC BLOOD PRESSURE: 80 MMHG | SYSTOLIC BLOOD PRESSURE: 122 MMHG | HEART RATE: 77 BPM | WEIGHT: 183 LBS | TEMPERATURE: 97 F

## 2022-04-14 DIAGNOSIS — E66.3 OVERWEIGHT (BMI 25.0-29.9): ICD-10-CM

## 2022-04-14 DIAGNOSIS — I10 HTN (HYPERTENSION), BENIGN: Primary | ICD-10-CM

## 2022-04-14 DIAGNOSIS — M77.8 FOREARM TENDONITIS: ICD-10-CM

## 2022-04-14 DIAGNOSIS — Z00.00 WELL ADULT EXAM: ICD-10-CM

## 2022-04-14 PROCEDURE — 3074F SYST BP LT 130 MM HG: CPT | Performed by: FAMILY MEDICINE

## 2022-04-14 PROCEDURE — 99214 OFFICE O/P EST MOD 30 MIN: CPT | Performed by: FAMILY MEDICINE

## 2022-04-14 PROCEDURE — 3008F BODY MASS INDEX DOCD: CPT | Performed by: FAMILY MEDICINE

## 2022-04-14 PROCEDURE — 3079F DIAST BP 80-89 MM HG: CPT | Performed by: FAMILY MEDICINE

## 2022-04-14 RX ORDER — TRAVOPROST OPHTHALMIC SOLUTION 0.04 MG/ML
1 SOLUTION OPHTHALMIC
COMMUNITY
Start: 2021-12-14

## 2022-04-29 ENCOUNTER — PATIENT MESSAGE (OUTPATIENT)
Dept: FAMILY MEDICINE CLINIC | Facility: CLINIC | Age: 62
End: 2022-04-29

## 2022-04-29 NOTE — TELEPHONE ENCOUNTER
Advised patient that can get the blood work(that was ordered on 4/14/2022) done now or a few days prior to appointment as long as she is fasting. Patient verbalized understanding and did not have any further questions.

## 2022-04-30 LAB
ABSOLUTE BASOPHILS: 68 CELLS/UL (ref 0–200)
ABSOLUTE EOSINOPHILS: 68 CELLS/UL (ref 15–500)
ABSOLUTE LYMPHOCYTES: 1581 CELLS/UL (ref 850–3900)
ABSOLUTE MONOCYTES: 275 CELLS/UL (ref 200–950)
ABSOLUTE NEUTROPHILS: 1408 CELLS/UL (ref 1500–7800)
ALBUMIN/GLOBULIN RATIO: 1.4 (CALC) (ref 1–2.5)
ALBUMIN: 4.2 G/DL (ref 3.6–5.1)
ALKALINE PHOSPHATASE: 62 U/L (ref 37–153)
ALT: 9 U/L (ref 6–29)
AST: 17 U/L (ref 10–35)
BASOPHILS: 2 %
BILIRUBIN, TOTAL: 0.5 MG/DL (ref 0.2–1.2)
BUN: 9 MG/DL (ref 7–25)
CALCIUM: 9.3 MG/DL (ref 8.6–10.4)
CARBON DIOXIDE: 30 MMOL/L (ref 20–32)
CHLORIDE: 103 MMOL/L (ref 98–110)
CHOL/HDLC RATIO: 2.5 (CALC)
CHOLESTEROL, TOTAL: 144 MG/DL
CREATININE: 0.8 MG/DL (ref 0.5–0.99)
EGFR IF AFRICN AM: 92 ML/MIN/1.73M2
EGFR IF NONAFRICN AM: 80 ML/MIN/1.73M2
EOSINOPHILS: 2 %
GLOBULIN: 3 G/DL (CALC) (ref 1.9–3.7)
GLUCOSE: 88 MG/DL (ref 65–99)
HDL CHOLESTEROL: 58 MG/DL
HEMATOCRIT: 37.6 % (ref 35–45)
HEMOGLOBIN: 12.6 G/DL (ref 11.7–15.5)
LDL-CHOLESTEROL: 71 MG/DL (CALC)
LYMPHOCYTES: 46.5 %
MCH: 29.5 PG (ref 27–33)
MCHC: 33.5 G/DL (ref 32–36)
MCV: 88.1 FL (ref 80–100)
MONOCYTES: 8.1 %
MPV: 11.6 FL (ref 7.5–12.5)
NEUTROPHILS: 41.4 %
NON-HDL CHOLESTEROL: 86 MG/DL (CALC)
PLATELET COUNT: 237 THOUSAND/UL (ref 140–400)
POTASSIUM: 4.1 MMOL/L (ref 3.5–5.3)
PROTEIN, TOTAL: 7.2 G/DL (ref 6.1–8.1)
RDW: 13.7 % (ref 11–15)
RED BLOOD CELL COUNT: 4.27 MILLION/UL (ref 3.8–5.1)
SODIUM: 139 MMOL/L (ref 135–146)
TRIGLYCERIDES: 69 MG/DL
TSH W/REFLEX TO FT4: 1.07 MIU/L (ref 0.4–4.5)
WHITE BLOOD CELL COUNT: 3.4 THOUSAND/UL (ref 3.8–10.8)

## 2022-05-04 ENCOUNTER — PATIENT MESSAGE (OUTPATIENT)
Dept: FAMILY MEDICINE CLINIC | Facility: CLINIC | Age: 62
End: 2022-05-04

## 2022-06-03 ENCOUNTER — TELEPHONE (OUTPATIENT)
Dept: PHYSICAL THERAPY | Facility: HOSPITAL | Age: 62
End: 2022-06-03

## 2022-06-06 ENCOUNTER — OFFICE VISIT (OUTPATIENT)
Dept: OCCUPATIONAL MEDICINE | Facility: HOSPITAL | Age: 62
End: 2022-06-06
Attending: FAMILY MEDICINE
Payer: COMMERCIAL

## 2022-06-06 DIAGNOSIS — M77.8 FOREARM TENDONITIS: ICD-10-CM

## 2022-06-06 PROCEDURE — 97166 OT EVAL MOD COMPLEX 45 MIN: CPT | Performed by: OCCUPATIONAL THERAPIST

## 2022-06-06 NOTE — PROGRESS NOTES
OCCUPATIONAL THERAPY UPPER EXTREMITY EVALUATION:   Referring Physician: Dr. Akila Sorensen  Date of onset: Sept., 2020  Diagnosis: Forearm tendonitis Date of Service: 06/06/22       PATIENT SUMMARY:   Jami Hernandez is a 64year old y/o female who presents to therapy today with complaints of pain in right forearm as well as right radial wrist pain. Pt describes pain level : 0/10 pain in right forearm today ,yesterday prior to medication 9/10  History of current condition:Patient reports she had swelling and pain in radial wrist beginning in Sept.,2020. She consulted orthopedic MD who provided cortisone injection. A few weeks later she developed a ganglion cyst on ulnar side of wrist as well as pain in her proximal forearm. In past 6 weeks she has been lifting weights which has helped control pain but over weekend pain in elbow/forearm at 9/10  Current functional limitations include: holding trays at work, placing things in overhead bin at work, sleeping  [place] Ashish describes prior level of function using right arm independently for self care and work. Employment: Working full duty  Hand Dominance: right  Living Situation: Alone    Pt goals include : reduce pain in elbow and radial wrist for ease in performing work duties. Past medical history was reviewed with Anna Hinojosa. Significant findings include: HTN,        ASSESSMENT:   Jami Hernandez is a pleasant middle age woman who came to therapy complaining of pain in right forearm. She works as a . She lives in a house, marital status unknown. Has recently began lifting weights and using lifting machines. Assessment indicates decreased active Right elbow extension, decreased active wrist ulnar deviation,tenderness in bicep,pain along 1st extensor compartment tendons. She has positive Eichoff's test which is suggestive of De Quervain's Tenosynovitis.  Pain in forearm and bicep is suggestive of distal bicep tendonitis, but she denies pain with resisted elbow flexion She has good strength with MMT of 5/5 in right elbow flexion/extension and wrist flexion/extension.  strength is normal bilaterally, weakness noted in right three point pinch. Given the above noted deficits in ROM, pain and strength, patient presents with impairments in occupation- based task performance for self care skills, work and leisure skills. Samuel Berkowitz could benefit from continued skilled OT to address the subcomponents of ROM, pain management and strength to allow her the ability to regain above- noted skills. In agreement with FOTO score and clinical rationale this evaluation involved Moderate complexity decision making due to 3 performance deficits, as well as no modifications of tasks or assistance. Also involves a brief review of occupational profile and medical and therapy history. Precautions: None        OBJECTIVE:   OBSERVATION: Patient was seen for initial evaluation, moist heat, AROM, STM and HEP instruction.     ORTHOTICS: thumb spica orthosis      AROM: bilateral shoulder AROM are WNL  Elbow Wrist   Flexion: R 150; L 150  Extension: R -20; L -5  Supination: R 85, L 85  Pronation: R 85, L 85 Flexion: R 70, L 70  Extension: R 80, L 85  Ulnar Deviation: R 30, L 40  Radial Deviation R 20, L 25         DIGIT ROM: Bilateral digit AROM are WNL      MANUAL MUSCLE TESTING:   Elbow Wrist   Flexion: R 5/5; L 5/5  Extension: R 5/5; L 5/5  Supination: R 5/5, L 5/5  Pronation: R 5/5, L 5/5 Flexion: R 5/5, L 5/5  Extension: R 5/5, L 5/5         Strength (lbs) Right            Trial          1           2            3          Average Left               Trial           1           2            3          Average   : 62 65 60 63    50 60 65 57      2 pt Pinch:                 3 pt Pinch: 12       17         Lateral Pinch: 15       15           Today's Treatment: Patient education provided on diagnosis, POC and HEP Pt was instructed in and issued a HEP for: elbow isometric exercises and AROM  Date 06/06/22 Visit # 1                                    Evaluation x                Manual                                                                             Ther ex                                                                                                                                                           HEP instruction   elbow AROM and isometric exercises x 10, 4x/day                                     Therapeutic Activity                                       Neuromuscular Re-education                                                             Modalities                                                             Charges: OT Eval x1      Total Timed Treatment: 7 min     Total Treatment Time: 45 min       PLAN OF CARE:   Goals:      Pt complaints of pain in right forearm will decrease at worst to 1/10 with overhead reaching. Pt will be independent and compliant with comprehensive HEP to maintain progress achieved in OT. Patient will demonstrate increase in right elbow extension to -10 degrees for ease in reaching into overhead bins. Patient will demonstrate increase in right three point pinch to at least 16 lbs for ease in opening cans of pop. Patient will demonstrate independence with don/doff thumb spica orthosis for right hand. Frequency / Duration: Patient will be seen for 1-2 x/week or a total of 8 visits over a 60 day period. Treatment will include: Manual Therapy, Soft tissue mobilization, AROM, PROM, Strengthening, Therapeutic Activity, Moist heat, cryotherapy, Ultrasound, Whirlpool (fluidotherapy), Paraffin, Electrical Stim, kinesiotape, Orthosis Patient education, Home exercise program,    Education or treatment limitation: None  Rehab Potential:good     FOTO: 65/100  Patient was advised of these findings, precautions, and treatment options and has agreed to actively participate in planning and for this course of care.     Thank you for your referral. Please co-sign or sign and return this letter via fax as soon as possible to [phone]. If you have any questions, please contact me at Dept: [phone]    Sincerely,  Electronically signed by therapist: JOAO Schumacher, CHT    [de-identified] certification required: Yes  I certify the need for these services furnished under this plan of treatment and while under my care.         X______________________________________________ Date________________  Certification From: 72/10/91      To: 08/06/22

## 2022-06-08 ENCOUNTER — APPOINTMENT (OUTPATIENT)
Dept: OCCUPATIONAL MEDICINE | Facility: HOSPITAL | Age: 62
End: 2022-06-08
Attending: FAMILY MEDICINE
Payer: COMMERCIAL

## 2022-06-13 ENCOUNTER — APPOINTMENT (OUTPATIENT)
Dept: OCCUPATIONAL MEDICINE | Facility: HOSPITAL | Age: 62
End: 2022-06-13
Payer: COMMERCIAL

## 2022-06-16 ENCOUNTER — APPOINTMENT (OUTPATIENT)
Dept: OCCUPATIONAL MEDICINE | Facility: HOSPITAL | Age: 62
End: 2022-06-16
Payer: COMMERCIAL

## 2022-06-20 ENCOUNTER — APPOINTMENT (OUTPATIENT)
Dept: OCCUPATIONAL MEDICINE | Facility: HOSPITAL | Age: 62
End: 2022-06-20
Payer: COMMERCIAL

## 2022-06-23 ENCOUNTER — OFFICE VISIT (OUTPATIENT)
Dept: OCCUPATIONAL MEDICINE | Facility: HOSPITAL | Age: 62
End: 2022-06-23
Attending: FAMILY MEDICINE
Payer: COMMERCIAL

## 2022-06-23 PROCEDURE — 97140 MANUAL THERAPY 1/> REGIONS: CPT | Performed by: OCCUPATIONAL THERAPIST

## 2022-06-23 NOTE — PROGRESS NOTES
Dx: right forearm tendonitis        Authorized # of Visits:  8         Next MD visit: none scheduled  Fall Risk: standard         Precautions: None           Medication Changes since last visit?: No  Subjective: 'My arm is feeling better, maybe 4/10 after a complete rest.\"    Objective: Treatment began with moist heat follow by IASTM, MFR, pec stretch, HEP instruction. See flow sheet  Date 06/06/22 06/23/22               Visit # 1  2/8                                  Evaluation x                Manual                   IASTM fanning, scraping of right forearm extensors   12 min               MFR of right UE  5 min          IASTM fanning, scraping of right forearm flexors    12 min                STM along  Right EPB and APL    5 min               Ther ex                   Pec stretch on 1/2 foam roll,    20 sec x 4               Self passive stretch of forearm flexors/extensors   15 sec x 4                                                                                                                   HEP instruction   elbow AROM and isometric exercises x 10, 4x/day                                     Therapeutic Activity                                       Neuromuscular Re-education                                                             Modalities                    Moist heat    3 min                                               Assessment: Patient reports she has not been working last few weeks and forearm has been feeling a lot better including radial wrist. Observed decreased elbow extension, treated with MFR of right UE. Patient responded well to treatment today. Review T band bicep and tricep PRE exercises. Goals:   Pt complaints of pain in right forearm will decrease at worst to 1/10 with overhead reaching. Pt will be independent and compliant with comprehensive HEP to maintain progress achieved in OT.   Patient will demonstrate increase in right elbow extension to -10 degrees for ease in reaching into overhead bins. Patient will demonstrate increase in right three point pinch to at least 16 lbs for ease in opening cans of pop. Patient will demonstrate independence with don/doff thumb spica orthosis for right hand. Plan: Continue to work on pain management and increasing strength.     Charges: MT3       Total Timed Treatment: 40 min  Total Treatment Time: 45 min

## 2022-06-27 ENCOUNTER — APPOINTMENT (OUTPATIENT)
Dept: OCCUPATIONAL MEDICINE | Facility: HOSPITAL | Age: 62
End: 2022-06-27
Payer: COMMERCIAL

## 2022-06-30 ENCOUNTER — OFFICE VISIT (OUTPATIENT)
Dept: SURGERY | Facility: CLINIC | Age: 62
End: 2022-06-30
Payer: COMMERCIAL

## 2022-06-30 ENCOUNTER — OFFICE VISIT (OUTPATIENT)
Dept: OCCUPATIONAL MEDICINE | Facility: HOSPITAL | Age: 62
End: 2022-06-30
Attending: FAMILY MEDICINE
Payer: COMMERCIAL

## 2022-06-30 VITALS
HEART RATE: 73 BPM | BODY MASS INDEX: 27.74 KG/M2 | WEIGHT: 183 LBS | SYSTOLIC BLOOD PRESSURE: 135 MMHG | DIASTOLIC BLOOD PRESSURE: 86 MMHG | OXYGEN SATURATION: 99 % | HEIGHT: 68 IN

## 2022-06-30 DIAGNOSIS — E66.3 OVERWEIGHT (BMI 25.0-29.9): ICD-10-CM

## 2022-06-30 DIAGNOSIS — F43.9 STRESS: ICD-10-CM

## 2022-06-30 DIAGNOSIS — I10 HTN (HYPERTENSION), BENIGN: Primary | ICD-10-CM

## 2022-06-30 DIAGNOSIS — Z51.81 ENCOUNTER FOR THERAPEUTIC DRUG MONITORING: ICD-10-CM

## 2022-06-30 PROCEDURE — 97110 THERAPEUTIC EXERCISES: CPT | Performed by: OCCUPATIONAL THERAPIST

## 2022-06-30 PROCEDURE — 3079F DIAST BP 80-89 MM HG: CPT | Performed by: INTERNAL MEDICINE

## 2022-06-30 PROCEDURE — 99214 OFFICE O/P EST MOD 30 MIN: CPT | Performed by: INTERNAL MEDICINE

## 2022-06-30 PROCEDURE — 3075F SYST BP GE 130 - 139MM HG: CPT | Performed by: INTERNAL MEDICINE

## 2022-06-30 PROCEDURE — 97140 MANUAL THERAPY 1/> REGIONS: CPT | Performed by: OCCUPATIONAL THERAPIST

## 2022-06-30 PROCEDURE — 3008F BODY MASS INDEX DOCD: CPT | Performed by: INTERNAL MEDICINE

## 2022-06-30 PROCEDURE — 97760 ORTHOTIC MGMT&TRAING 1ST ENC: CPT | Performed by: OCCUPATIONAL THERAPIST

## 2022-06-30 RX ORDER — PHENTERMINE HYDROCHLORIDE 30 MG/1
30 CAPSULE ORAL EVERY MORNING
Qty: 30 CAPSULE | Refills: 2 | Status: SHIPPED | OUTPATIENT
Start: 2022-06-30

## 2022-06-30 RX ORDER — TOPIRAMATE 25 MG/1
25 TABLET ORAL EVERY EVENING
Qty: 30 TABLET | Refills: 2 | Status: SHIPPED | OUTPATIENT
Start: 2022-06-30

## 2022-06-30 NOTE — PROGRESS NOTES
Dx: right forearm tendonitis        Authorized # of Visits:  8         Next MD visit: none scheduled  Fall Risk: standard         Precautions: None           Medication Changes since last visit?: No  Subjective: Patient reports feeling most pain in radial wrist after working. 4/10 pain    Objective: Treatment began with moist heat follow by STM,thumb AROM, Triceps/Anconeus isometric exercise, orthosis fabrication,HEP instruction. See flow sheet  Date 06/06/22 06/23/22 06/30/22             Visit # 1  2/8  3/8                                Evaluation x                Manual                   IASTM fanning, scraping of right forearm extensors   12 min  5 min Anconeus STM             MFR of right UE  5 min          IASTM fanning, scraping of right forearm flexors    12 min  right thumb orthosis fabrication with wear and care instruction              STM along  Right EPB and APL    5 min  5 min             Ther ex                   Pec stretch on 1/2 foam roll,    20 sec x 4  pulley elbow stretch x20             Self passive stretch of forearm flexors/extensors   15 sec x 4  thumb AROM extension/flexion with tissue adjusted x 10              isometric triceps/Anconeus strengthening       3 sec x 20              velcro checkers, two point pinch      x40              Velcro  three point pinch of blue clothespin      x40                                                     HEP instruction   elbow AROM and isometric exercises x 10, 4x/day    isometric triceps/Anconeus strengthening exercise x 10, 3x/day                                 Therapeutic Activity                                       Neuromuscular Re-education                                                             Modalities                    Moist heat    3 min  3 min                                       Assessment: Patient reporting pain through Anconeus Ms and along EPB/APL tendons. Responded well to STM, AAROM thumb exercises and isometric triceps exercise. Patient independent with don/doff orthosis, understands wear and care instructions. Goals:   Pt complaints of pain in right forearm will decrease at worst to 1/10 with overhead reaching. Pt will be independent and compliant with comprehensive HEP to maintain progress achieved in OT. Patient will demonstrate increase in right elbow extension to -10 degrees for ease in reaching into overhead bins. Patient will demonstrate increase in right three point pinch to at least 16 lbs for ease in opening cans of pop. Patient will demonstrate independence with don/doff thumb spica orthosis for right hand. Briana Neves .(Achieved)    Plan: Continue to work on pain management and increasing strength.     Charges: MT, ortho,TE      Total Timed Treatment: 40 min  Total Treatment Time: 45 min

## 2022-07-08 ENCOUNTER — APPOINTMENT (OUTPATIENT)
Dept: OCCUPATIONAL MEDICINE | Facility: HOSPITAL | Age: 62
End: 2022-07-08
Attending: FAMILY MEDICINE
Payer: COMMERCIAL

## 2022-07-19 ENCOUNTER — APPOINTMENT (OUTPATIENT)
Dept: OCCUPATIONAL MEDICINE | Facility: HOSPITAL | Age: 62
End: 2022-07-19
Attending: FAMILY MEDICINE
Payer: COMMERCIAL

## 2022-09-15 ENCOUNTER — LAB ENCOUNTER (OUTPATIENT)
Dept: LAB | Facility: HOSPITAL | Age: 62
End: 2022-09-15
Attending: INTERNAL MEDICINE
Payer: COMMERCIAL

## 2022-09-15 DIAGNOSIS — E06.3 CHRONIC LYMPHOCYTIC THYROIDITIS: Primary | ICD-10-CM

## 2022-09-15 LAB
T4 FREE SERPL-MCNC: 0.9 NG/DL (ref 0.8–1.7)
TSI SER-ACNC: 3.78 MIU/ML (ref 0.36–3.74)

## 2022-09-15 PROCEDURE — 84443 ASSAY THYROID STIM HORMONE: CPT

## 2022-09-15 PROCEDURE — 36415 COLL VENOUS BLD VENIPUNCTURE: CPT

## 2022-09-15 PROCEDURE — 84439 ASSAY OF FREE THYROXINE: CPT

## 2022-10-13 ENCOUNTER — OFFICE VISIT (OUTPATIENT)
Dept: SURGERY | Facility: CLINIC | Age: 62
End: 2022-10-13
Payer: COMMERCIAL

## 2022-10-13 VITALS
OXYGEN SATURATION: 100 % | BODY MASS INDEX: 26.83 KG/M2 | SYSTOLIC BLOOD PRESSURE: 123 MMHG | HEIGHT: 68 IN | WEIGHT: 177 LBS | DIASTOLIC BLOOD PRESSURE: 78 MMHG | HEART RATE: 67 BPM

## 2022-10-13 DIAGNOSIS — Z51.81 ENCOUNTER FOR THERAPEUTIC DRUG MONITORING: ICD-10-CM

## 2022-10-13 DIAGNOSIS — F43.9 STRESS: ICD-10-CM

## 2022-10-13 DIAGNOSIS — E66.3 OVERWEIGHT (BMI 25.0-29.9): ICD-10-CM

## 2022-10-13 DIAGNOSIS — I10 HTN (HYPERTENSION), BENIGN: Primary | ICD-10-CM

## 2022-10-13 PROCEDURE — 99214 OFFICE O/P EST MOD 30 MIN: CPT | Performed by: INTERNAL MEDICINE

## 2022-10-13 PROCEDURE — 3074F SYST BP LT 130 MM HG: CPT | Performed by: INTERNAL MEDICINE

## 2022-10-13 PROCEDURE — 3008F BODY MASS INDEX DOCD: CPT | Performed by: INTERNAL MEDICINE

## 2022-10-13 PROCEDURE — 3078F DIAST BP <80 MM HG: CPT | Performed by: INTERNAL MEDICINE

## 2022-10-13 RX ORDER — PHENTERMINE HYDROCHLORIDE 30 MG/1
30 CAPSULE ORAL EVERY MORNING
Qty: 30 CAPSULE | Refills: 2 | Status: SHIPPED | OUTPATIENT
Start: 2022-10-13

## 2022-10-27 ENCOUNTER — TELEPHONE (OUTPATIENT)
Dept: FAMILY MEDICINE CLINIC | Facility: CLINIC | Age: 62
End: 2022-10-27

## 2022-10-27 NOTE — TELEPHONE ENCOUNTER
Received form from [employer] for Medical compression and foot Orthosis form signed by Dr. Masoud Cobb and faxed back successful.

## 2022-10-28 ENCOUNTER — TELEPHONE (OUTPATIENT)
Dept: SURGERY | Facility: CLINIC | Age: 62
End: 2022-10-28

## 2022-10-28 ENCOUNTER — DOCUMENTATION ONLY (OUTPATIENT)
Dept: SURGERY | Facility: CLINIC | Age: 62
End: 2022-10-28

## 2022-10-28 DIAGNOSIS — E66.3 OVERWEIGHT (BMI 25.0-29.9): ICD-10-CM

## 2022-10-28 RX ORDER — TOPIRAMATE 25 MG/1
25 TABLET ORAL EVERY EVENING
Qty: 30 TABLET | Refills: 2 | Status: SHIPPED | OUTPATIENT
Start: 2022-10-28

## 2022-10-28 RX ORDER — PHENTERMINE HYDROCHLORIDE 30 MG/1
30 CAPSULE ORAL EVERY MORNING
Qty: 30 CAPSULE | Refills: 2 | Status: SHIPPED | OUTPATIENT
Start: 2022-10-28

## 2022-10-28 RX ORDER — PHENTERMINE HYDROCHLORIDE 30 MG/1
30 CAPSULE ORAL EVERY MORNING
Qty: 30 CAPSULE | Refills: 2 | Status: SHIPPED | OUTPATIENT
Start: 2022-10-28 | End: 2022-10-28

## 2022-11-01 ENCOUNTER — OFFICE VISIT (OUTPATIENT)
Dept: FAMILY MEDICINE CLINIC | Facility: CLINIC | Age: 62
End: 2022-11-01
Payer: COMMERCIAL

## 2022-11-01 VITALS
HEIGHT: 68 IN | WEIGHT: 178 LBS | TEMPERATURE: 97 F | DIASTOLIC BLOOD PRESSURE: 84 MMHG | BODY MASS INDEX: 26.98 KG/M2 | HEART RATE: 83 BPM | SYSTOLIC BLOOD PRESSURE: 120 MMHG

## 2022-11-01 DIAGNOSIS — M77.8 TENDINITIS OF RIGHT WRIST: ICD-10-CM

## 2022-11-01 DIAGNOSIS — R06.7 SNEEZING: ICD-10-CM

## 2022-11-01 DIAGNOSIS — E03.9 ACQUIRED HYPOTHYROIDISM: ICD-10-CM

## 2022-11-01 DIAGNOSIS — E66.3 OVERWEIGHT (BMI 25.0-29.9): ICD-10-CM

## 2022-11-01 DIAGNOSIS — Z00.00 WELL ADULT EXAM: Primary | ICD-10-CM

## 2022-11-01 DIAGNOSIS — E55.9 VITAMIN D DEFICIENCY: ICD-10-CM

## 2022-11-01 DIAGNOSIS — I10 HTN (HYPERTENSION), BENIGN: ICD-10-CM

## 2022-11-01 DIAGNOSIS — Z78.9 PROLONGED STANDING: ICD-10-CM

## 2022-11-01 DIAGNOSIS — Z23 NEED FOR TDAP VACCINATION: ICD-10-CM

## 2022-11-01 DIAGNOSIS — R09.81 NASAL CONGESTION: ICD-10-CM

## 2022-11-01 DIAGNOSIS — Z12.31 ENCOUNTER FOR SCREENING MAMMOGRAM FOR BREAST CANCER: ICD-10-CM

## 2022-11-01 PROCEDURE — 3074F SYST BP LT 130 MM HG: CPT | Performed by: FAMILY MEDICINE

## 2022-11-01 PROCEDURE — 90686 IIV4 VACC NO PRSV 0.5 ML IM: CPT | Performed by: FAMILY MEDICINE

## 2022-11-01 PROCEDURE — 99396 PREV VISIT EST AGE 40-64: CPT | Performed by: FAMILY MEDICINE

## 2022-11-01 PROCEDURE — 3008F BODY MASS INDEX DOCD: CPT | Performed by: FAMILY MEDICINE

## 2022-11-01 PROCEDURE — 90715 TDAP VACCINE 7 YRS/> IM: CPT | Performed by: FAMILY MEDICINE

## 2022-11-01 PROCEDURE — 90472 IMMUNIZATION ADMIN EACH ADD: CPT | Performed by: FAMILY MEDICINE

## 2022-11-01 PROCEDURE — 3079F DIAST BP 80-89 MM HG: CPT | Performed by: FAMILY MEDICINE

## 2022-11-01 PROCEDURE — 90471 IMMUNIZATION ADMIN: CPT | Performed by: FAMILY MEDICINE

## 2022-11-01 RX ORDER — LOSARTAN POTASSIUM AND HYDROCHLOROTHIAZIDE 12.5; 5 MG/1; MG/1
0.5 TABLET ORAL DAILY
Qty: 45 TABLET | Refills: 3 | Status: SHIPPED | OUTPATIENT
Start: 2022-11-01

## 2022-11-01 RX ORDER — DORZOLAMIDE HCL 20 MG/ML
1 SOLUTION/ DROPS OPHTHALMIC AS DIRECTED
COMMUNITY
End: 2022-11-01

## 2022-11-01 RX ORDER — LEVOTHYROXINE SODIUM 75 MCG
TABLET ORAL
COMMUNITY
Start: 2022-09-06

## 2022-11-09 RX ORDER — VALACYCLOVIR HYDROCHLORIDE 1 G/1
TABLET, FILM COATED ORAL
Qty: 60 TABLET | Refills: 0 | Status: SHIPPED | OUTPATIENT
Start: 2022-11-09

## 2022-11-15 DIAGNOSIS — I10 HTN (HYPERTENSION), BENIGN: ICD-10-CM

## 2022-11-15 RX ORDER — LOSARTAN POTASSIUM AND HYDROCHLOROTHIAZIDE 12.5; 5 MG/1; MG/1
0.5 TABLET ORAL DAILY
Qty: 45 TABLET | Refills: 3 | Status: SHIPPED | OUTPATIENT
Start: 2022-11-15

## 2022-11-15 RX ORDER — PHENTERMINE HYDROCHLORIDE 15 MG/1
15 CAPSULE ORAL EVERY MORNING
Qty: 30 CAPSULE | Refills: 2 | Status: SHIPPED | OUTPATIENT
Start: 2022-11-15

## 2022-11-22 ENCOUNTER — TELEPHONE (OUTPATIENT)
Dept: SURGERY | Facility: CLINIC | Age: 62
End: 2022-11-22

## 2022-11-23 LAB
ABSOLUTE BASOPHILS: 49 CELLS/UL (ref 0–200)
ABSOLUTE EOSINOPHILS: 49 CELLS/UL (ref 15–500)
ABSOLUTE LYMPHOCYTES: 1401 CELLS/UL (ref 850–3900)
ABSOLUTE MONOCYTES: 168 CELLS/UL (ref 200–950)
ABSOLUTE NEUTROPHILS: 1233 CELLS/UL (ref 1500–7800)
ALBUMIN/GLOBULIN RATIO: 1.5 (CALC) (ref 1–2.5)
ALBUMIN: 4.2 G/DL (ref 3.6–5.1)
ALKALINE PHOSPHATASE: 69 U/L (ref 37–153)
ALT: 19 U/L (ref 6–29)
AST: 20 U/L (ref 10–35)
BASOPHILS: 1.7 %
BILIRUBIN, TOTAL: 0.5 MG/DL (ref 0.2–1.2)
BUN: 12 MG/DL (ref 7–25)
CALCIUM: 9.1 MG/DL (ref 8.6–10.4)
CARBON DIOXIDE: 28 MMOL/L (ref 20–32)
CHLORIDE: 105 MMOL/L (ref 98–110)
CHOL/HDLC RATIO: 2.2 (CALC)
CHOLESTEROL, TOTAL: 164 MG/DL
CREATININE: 0.82 MG/DL (ref 0.5–1.05)
EGFR: 81 ML/MIN/1.73M2
EOSINOPHILS: 1.7 %
GLOBULIN: 2.8 G/DL (CALC) (ref 1.9–3.7)
GLUCOSE: 84 MG/DL (ref 65–99)
HDL CHOLESTEROL: 74 MG/DL
HEMATOCRIT: 38.9 % (ref 35–45)
HEMOGLOBIN: 12.9 G/DL (ref 11.7–15.5)
LDL-CHOLESTEROL: 77 MG/DL (CALC)
LYMPHOCYTES: 48.3 %
MCH: 29.4 PG (ref 27–33)
MCHC: 33.2 G/DL (ref 32–36)
MCV: 88.6 FL (ref 80–100)
MONOCYTES: 5.8 %
MPV: 11.6 FL (ref 7.5–12.5)
NEUTROPHILS: 42.5 %
NON-HDL CHOLESTEROL: 90 MG/DL (CALC)
PLATELET COUNT: 235 THOUSAND/UL (ref 140–400)
POTASSIUM: 3.9 MMOL/L (ref 3.5–5.3)
PROTEIN, TOTAL: 7 G/DL (ref 6.1–8.1)
RDW: 13 % (ref 11–15)
RED BLOOD CELL COUNT: 4.39 MILLION/UL (ref 3.8–5.1)
SODIUM: 139 MMOL/L (ref 135–146)
TRIGLYCERIDES: 57 MG/DL
TSH W/REFLEX TO FT4: 1.18 MIU/L (ref 0.4–4.5)
VITAMIN D, 25-OH, TOTAL: 39 NG/ML (ref 30–100)
WHITE BLOOD CELL COUNT: 2.9 THOUSAND/UL (ref 3.8–10.8)

## 2022-11-23 RX ORDER — PHENTERMINE HYDROCHLORIDE 37.5 MG/1
37.5 TABLET ORAL
Qty: 30 TABLET | Refills: 2 | Status: SHIPPED | OUTPATIENT
Start: 2022-11-23

## 2022-12-19 DIAGNOSIS — M77.8 TENDINITIS OF RIGHT WRIST: ICD-10-CM

## 2023-01-02 NOTE — ED INITIAL ASSESSMENT (HPI)
Problem: Pain  Goal: #Acceptable pain level achieved/maintained at rest using NRS/Faces  Description: This goal is used for patients who can self-report.  Acceptable means the level is at or below the identified comfort/function goal.  Outcome: Outcome Not Met, Continue to Monitor  Goal: # Acceptable pain level achieved/maintained at rest using NRS/Faces without oversedation (opioid naive or PCA/Epidural infusion)  Description: This goal is used if Opioid-naïve or on PCA/Epidural Infusion.  Outcome: Outcome Not Met, Continue to Monitor  Goal: # Acceptable pain level achieved/maintained with activity using NRS/Faces  Description: This goal is used for patients who can self-report and are not achieving acceptable pain control during activity.  Outcome: Outcome Not Met, Continue to Monitor  Goal: Acceptable pain/comfort level is achieved/maintained at rest (based on Pain Behaviors Scale)  Description: This goal is used for patients who are not able to self-report pain and are assessed for pain using the Pain Behaviors Scale  Outcome: Outcome Not Met, Continue to Monitor  Goal: Acceptable pain/comfort level is achieved/maintained at rest based on PAINAID scale (Dementia)  Description: This goal is used for patients who are not able to self-report pain, have dementia, and assessed using the PAINAD scale.  Outcome: Outcome Not Met, Continue to Monitor  Goal: Acceptable pain/comfort level is achieved/maintained at rest (based on pediatric behavior tool: NIPS, NPASS, or FLACC)  Description: This goal is used for pediatric patients who are not able to self report pain.  Outcome: Outcome Not Met, Continue to Monitor  Goal: # Verbalizes understanding of pain management  Description: Documented in Patient Education Activity  Outcome: Outcome Not Met, Continue to Monitor  Goal: Verbalizes understanding and effective use of Patient Controlled Analgesia (PCA)  Description: Documented in Patient Education Activity  This goal is  Pt c/o diarrhea and abdominal pain x 24 hours. used for patients with PCA  Outcome: Outcome Not Met, Continue to Monitor  Goal: Maximum comfort achieved/maintained at end of life (Hospice)  Outcome: Outcome Not Met, Continue to Monitor     Problem: Activity Intolerance  Goal: # Functional status is maintained or returned to baseline  Outcome: Outcome Not Met, Continue to Monitor  Goal: # Tolerates activity for d/c setting with no clinical problems  Outcome: Outcome Not Met, Continue to Monitor     Problem: VTE, Risk for  Goal: # No s/s of VTE  Outcome: Outcome Not Met, Continue to Monitor  Goal: # Verbalizes understanding of VTE risk factors and prevention  Description: Document education using the patient education activity.   Outcome: Outcome Not Met, Continue to Monitor  Goal: Demonstrates ability to administer injectable anticoagulants if ordered for d/c  Description: Document education using the patient education activity.  Outcome: Outcome Not Met, Continue to Monitor

## 2023-01-19 ENCOUNTER — OFFICE VISIT (OUTPATIENT)
Dept: SURGERY | Facility: CLINIC | Age: 63
End: 2023-01-19
Payer: COMMERCIAL

## 2023-01-19 VITALS
OXYGEN SATURATION: 98 % | BODY MASS INDEX: 27.41 KG/M2 | SYSTOLIC BLOOD PRESSURE: 132 MMHG | HEART RATE: 78 BPM | WEIGHT: 180.88 LBS | HEIGHT: 68 IN | DIASTOLIC BLOOD PRESSURE: 89 MMHG

## 2023-01-19 DIAGNOSIS — F43.9 STRESS: ICD-10-CM

## 2023-01-19 DIAGNOSIS — Z51.81 ENCOUNTER FOR THERAPEUTIC DRUG MONITORING: ICD-10-CM

## 2023-01-19 DIAGNOSIS — E66.3 OVERWEIGHT (BMI 25.0-29.9): ICD-10-CM

## 2023-01-19 DIAGNOSIS — I10 HTN (HYPERTENSION), BENIGN: Primary | ICD-10-CM

## 2023-01-19 PROCEDURE — 3008F BODY MASS INDEX DOCD: CPT | Performed by: INTERNAL MEDICINE

## 2023-01-19 PROCEDURE — 3077F SYST BP >= 140 MM HG: CPT | Performed by: INTERNAL MEDICINE

## 2023-01-19 PROCEDURE — 3079F DIAST BP 80-89 MM HG: CPT | Performed by: INTERNAL MEDICINE

## 2023-01-19 PROCEDURE — 99214 OFFICE O/P EST MOD 30 MIN: CPT | Performed by: INTERNAL MEDICINE

## 2023-01-19 RX ORDER — PHENTERMINE HYDROCHLORIDE 30 MG/1
30 CAPSULE ORAL EVERY MORNING
Qty: 30 CAPSULE | Refills: 1 | Status: SHIPPED | OUTPATIENT
Start: 2023-01-19

## 2023-01-19 RX ORDER — TOPIRAMATE 25 MG/1
25 TABLET ORAL EVERY EVENING
Qty: 30 TABLET | Refills: 2 | Status: SHIPPED | OUTPATIENT
Start: 2023-01-19

## 2023-03-08 ENCOUNTER — OFFICE VISIT (OUTPATIENT)
Dept: OBGYN CLINIC | Facility: CLINIC | Age: 63
End: 2023-03-08
Payer: COMMERCIAL

## 2023-03-08 VITALS — DIASTOLIC BLOOD PRESSURE: 82 MMHG | BODY MASS INDEX: 28 KG/M2 | SYSTOLIC BLOOD PRESSURE: 126 MMHG | WEIGHT: 181.31 LBS

## 2023-03-08 DIAGNOSIS — N90.7 SEBACEOUS CYST OF LABIA: Primary | ICD-10-CM

## 2023-03-08 PROBLEM — N89.8 VAGINAL CYST: Status: ACTIVE | Noted: 2023-03-08

## 2023-03-08 PROCEDURE — 3079F DIAST BP 80-89 MM HG: CPT | Performed by: OBSTETRICS & GYNECOLOGY

## 2023-03-08 PROCEDURE — 3074F SYST BP LT 130 MM HG: CPT | Performed by: OBSTETRICS & GYNECOLOGY

## 2023-03-08 PROCEDURE — 99213 OFFICE O/P EST LOW 20 MIN: CPT | Performed by: OBSTETRICS & GYNECOLOGY

## 2023-03-08 RX ORDER — DORZOLAMIDE HCL 20 MG/ML
1 SOLUTION/ DROPS OPHTHALMIC AS DIRECTED
COMMUNITY

## 2023-03-13 ENCOUNTER — TELEPHONE (OUTPATIENT)
Dept: OBGYN CLINIC | Facility: CLINIC | Age: 63
End: 2023-03-13

## 2023-03-13 NOTE — TELEPHONE ENCOUNTER
The patient called asking for a code for a sebaceous cyst of the right labia. She stated it's for insurance purposes.

## 2023-03-14 ENCOUNTER — OFFICE VISIT (OUTPATIENT)
Dept: FAMILY MEDICINE CLINIC | Facility: CLINIC | Age: 63
End: 2023-03-14

## 2023-03-14 VITALS
SYSTOLIC BLOOD PRESSURE: 114 MMHG | HEIGHT: 68 IN | BODY MASS INDEX: 26.83 KG/M2 | WEIGHT: 177 LBS | DIASTOLIC BLOOD PRESSURE: 77 MMHG | HEART RATE: 64 BPM

## 2023-03-14 DIAGNOSIS — R09.82 POST-NASAL DRIP: ICD-10-CM

## 2023-03-14 DIAGNOSIS — Z76.89 ESTABLISHING CARE WITH NEW DOCTOR, ENCOUNTER FOR: Primary | ICD-10-CM

## 2023-03-14 DIAGNOSIS — E03.9 HYPOTHYROIDISM, UNSPECIFIED TYPE: ICD-10-CM

## 2023-03-14 DIAGNOSIS — I10 HTN (HYPERTENSION), BENIGN: ICD-10-CM

## 2023-03-14 PROCEDURE — 3078F DIAST BP <80 MM HG: CPT | Performed by: FAMILY MEDICINE

## 2023-03-14 PROCEDURE — 3008F BODY MASS INDEX DOCD: CPT | Performed by: FAMILY MEDICINE

## 2023-03-14 PROCEDURE — 99214 OFFICE O/P EST MOD 30 MIN: CPT | Performed by: FAMILY MEDICINE

## 2023-03-14 PROCEDURE — 3074F SYST BP LT 130 MM HG: CPT | Performed by: FAMILY MEDICINE

## 2023-03-14 RX ORDER — LEVOCETIRIZINE DIHYDROCHLORIDE 5 MG/1
5 TABLET, FILM COATED ORAL EVERY EVENING
Qty: 30 TABLET | Refills: 1 | Status: SHIPPED | OUTPATIENT
Start: 2023-03-14

## 2023-03-14 NOTE — PATIENT INSTRUCTIONS
Medication reviewed and renewed where needed and appropriate. Comply with medications. Monitor blood pressures and record at home. Limit salt intake. Encouraged physical fitness and daily physical activity daily. Standby prescription for Xyzal to be taken nightly as needed for postnasal drainage. Increase H2O consumption. Minimize dairy products as this thickens secretions. Patient has been encouraged to make sure that she chews her food adequately in the oral phase of consumption. Increase mouth rinses with diluted oral rinse products his nose and/or peroxide.

## 2023-03-28 ENCOUNTER — OFFICE VISIT (OUTPATIENT)
Dept: OBGYN CLINIC | Facility: CLINIC | Age: 63
End: 2023-03-28
Payer: COMMERCIAL

## 2023-03-28 DIAGNOSIS — N90.7 SEBACEOUS CYST OF LABIA: Primary | ICD-10-CM

## 2023-03-28 PROCEDURE — 88304 TISSUE EXAM BY PATHOLOGIST: CPT | Performed by: OBSTETRICS & GYNECOLOGY

## 2023-03-28 PROCEDURE — 56605 BIOPSY OF VULVA/PERINEUM: CPT | Performed by: OBSTETRICS & GYNECOLOGY

## 2023-03-28 NOTE — PROCEDURES
PROCEDURE NOTE     Vulvar Biopsy   Daria Vargas MD      Procedure discussed with patient in detail including indication, risk, benefits, alternatives and complications. Consent obtained. Lesion Description:   3 mm right labial sebaceous cyst    Procedure:  Betadine wash of procedural site. 1% lidocaine without epinephrine used topically for local anesthesia. Vulvar biopsy done using 3 mm  key punch biopsy of area. Silver nitrate used to achieve hemostasis. Patient tolerated procedure well. Plan:  Site care discussed with patient. Neosporin twice a day. Follow-up in 2 weeks.       Daria Vargas MD  3/28/2023

## 2023-03-30 NOTE — PROGRESS NOTES
Released to [employer] and message from provider/results was viewed by patient.    Seen by patient Mateo Chin on 3/29/2023  7:06 PM

## 2023-05-17 ENCOUNTER — OFFICE VISIT (OUTPATIENT)
Dept: SURGERY | Facility: CLINIC | Age: 63
End: 2023-05-17
Payer: COMMERCIAL

## 2023-05-17 VITALS
SYSTOLIC BLOOD PRESSURE: 134 MMHG | HEART RATE: 67 BPM | DIASTOLIC BLOOD PRESSURE: 84 MMHG | BODY MASS INDEX: 27.33 KG/M2 | OXYGEN SATURATION: 98 % | HEIGHT: 68 IN | WEIGHT: 180.31 LBS

## 2023-05-17 DIAGNOSIS — E66.3 OVERWEIGHT (BMI 25.0-29.9): ICD-10-CM

## 2023-05-17 DIAGNOSIS — I10 HTN (HYPERTENSION), BENIGN: Primary | ICD-10-CM

## 2023-05-17 DIAGNOSIS — Z51.81 ENCOUNTER FOR THERAPEUTIC DRUG MONITORING: ICD-10-CM

## 2023-05-17 DIAGNOSIS — F43.9 STRESS: ICD-10-CM

## 2023-05-17 PROCEDURE — 99214 OFFICE O/P EST MOD 30 MIN: CPT | Performed by: INTERNAL MEDICINE

## 2023-05-17 PROCEDURE — 3079F DIAST BP 80-89 MM HG: CPT | Performed by: INTERNAL MEDICINE

## 2023-05-17 PROCEDURE — 3008F BODY MASS INDEX DOCD: CPT | Performed by: INTERNAL MEDICINE

## 2023-05-17 PROCEDURE — 3075F SYST BP GE 130 - 139MM HG: CPT | Performed by: INTERNAL MEDICINE

## 2023-05-17 RX ORDER — PHENTERMINE HYDROCHLORIDE 37.5 MG/1
37.5 TABLET ORAL
Qty: 30 TABLET | Refills: 2 | Status: SHIPPED | OUTPATIENT
Start: 2023-05-17

## 2023-05-20 DIAGNOSIS — E66.3 OVERWEIGHT (BMI 25.0-29.9): ICD-10-CM

## 2023-05-22 ENCOUNTER — TELEPHONE (OUTPATIENT)
Dept: SURGERY | Facility: CLINIC | Age: 63
End: 2023-05-22

## 2023-05-22 RX ORDER — TOPIRAMATE 25 MG/1
TABLET ORAL
Qty: 90 TABLET | Refills: 0 | Status: SHIPPED | OUTPATIENT
Start: 2023-05-22

## 2023-05-22 NOTE — TELEPHONE ENCOUNTER
Per [address], KPVRAL and Phentermine medications are a plan exclusion to pharmacy benefits. Patient informed. Also contacted [address] [place] [address] to apply a Good Rx savings coupon card to Phentermine script. Out of pocket cost will be $17.42. Patient notified.

## 2023-05-23 NOTE — PROGRESS NOTES
Return Office Visit     CHIEF COMPLAINT:    Hypothyroidism     HISTORY OF PRESENT ILLNESS:  Lizy Alvares is a 62year old female who presents for follow up for Hypothyroidism. Diagnosed around age 48.   FH of thyroid problems: no      Current dose: 75 What Is The Reason For Today's Visit?: Upper Body Skin Exam nausea, vomiting, diarrhea, constipation, bleeding  Neurology: Negative for: headache, numbness, weakness  Genito-Urinary: Negative for: dysuria, frequency  Psychiatric: Negative for:  depression, anxiety  Hematology/Lymphatics: Negative for: bruising, low needed.                    No orders of the defined types were placed in this encounter.         10/8/2018  Min Bello MD

## 2023-05-25 ENCOUNTER — TELEPHONE (OUTPATIENT)
Dept: SURGERY | Facility: CLINIC | Age: 63
End: 2023-05-25

## 2023-06-22 NOTE — PROGRESS NOTES
I spoke to the patient and scheduled his procedure for 8/15/2023 at [facility] with Dr Larry Baker     -instructions given verbally and mailed  -patient aware to be NPO, needs a  and use an enema 1 hour prior to leaving the house morning of procedure  -patient aware to avoid any potentially blood thinning medications 7 days prior  -CBC, CMP, Urine C&S and EKG 2 weeks prior  -PO/PB 8/29/2023 Shelbi Sanchez is a 62year old female. Patient presents with:   Follow - Up: regarding postnasal discharge, improvement in symptoms       HISTORY OF PRESENT ILLNESS    She presents with a one-week history of a sore throat with swollen red tonsils and nasa • Clotting Disorder Neg        Past Medical History:   Diagnosis Date   • Essential hypertension    • Glaucoma 2006 6/20/17 1st visit- was on Cosopt bid OU and Latanoprost qhs OU- 6/17/17 normal OCT and VF, so D/C Cosopt, but continue Latanoprost qhs Normal. Cranial nerves - Cranial nerves II through XII grossly intact.    Head/Face Normal Facial features - Normal. Eyebrows - Normal. Skull - Normal.        Nasopharynx Normal External nose - Normal. Lips/teeth/gums - Normal. Tonsils - Normal. Oropharynx and to return to see me on a 1 year basis for routine exam and refill of all her medications. She agrees with this plan. Alberto Aguilera.  Franca Herrera MD    7/9/2019    9:08 AM

## 2023-09-09 NOTE — PROGRESS NOTES
Ceci Alvarado is a 62year old female. HPI:     HPI     Patient is here for an IOP check after stopping Latanoprost OU QHS on 9/16/17 and Cosopt on 6/20/17.         Last edited by Mynor Diaz OT on 1/17/2018  7:42 AM. (History)        Patient Histor General Surgery Operative Note    Pre-operative diagnosis:  Gall stone pancreatitis [K85.10]   Post-operative diagnosis: same   Procedure: Laparoscopic Cholecystectomy   Surgeon: Marco Almaraz MD   Assistant(s): Ellen Garcia PA-C - the physician assistant was medically necessary to assist in prepping, positioning, camera operation, retraction/exposure and closure of the port site.   Meron Rojas, MS-3   Anesthesia: General    Estimated blood loss: 5 cc's   Drains placed: None   Complications:  None   Findings:  Gallbladder filled with stones     INDICATIONS FOR OPERATION: This is a patient with upper abdominal pain, gallstones and resolving pancreatitis.  He underwent an ERCP yesterday.  Laparoscopic cholecystectomy was recommended.  The procedure along with its risks and complications was discussed in detail and the patient agreed to proceed.    DETAILS OF THE OPERATION: After informed consent the patient was taken to the operating room where he underwent satisfactory induction of general anesthesia.  The patient was then sterilely prepped and draped.  A supraumbilical skin incision was made using a skin knife.  The dissection was carried bluntly down to the fascia.  The fascia was opened using electrocautery and the Finn trocar was then inserted.  Pneumoperitoneum was achieved using CO2 insufflation, and under direct visualization  three  5 mm upper abdominal ports were placed.  The gallbladder was visualized and was grasped. It was pulled up over the liver and some adhesions to the gallbladder were taken down, suggesting previous inflammation.  The cystic duct was now exposed.  ICG fluorescence imaging was used to facilitate ductal identification.  The cystic duct was skeletonized, triple clipped and divided.  The cystic artery was likewise triple clipped and divided, along with any posterior branches.  The gallbladder was then dissected away from the liver using electrocautery.  The gallbladder was  BOWEL PREP KIT) 17.5-3.13-1.6 GM/180ML Oral Solution Take as directed per GI consult notes Disp: 1 Bottle Rfl: 0   Multiple Vitamins-Minerals (HAIR/SKIN/NAILS) Oral Tab Take by mouth. Disp:  Rfl:    Probiotic Product (PROBIOTIC DAILY OR) Take by mouth.  Dis then placed in an Endo Catch bag and removed through the supraumbilical incision.  The gallbladder fossa was irrigated out.  There was excellent hemostasis and the clips were in good position.  The trocar sites were now infiltrated with half percent Marcaine with epinephrine.  The trochars were removed under direct visualization.  The supraumbilical fascia was then closed using 0 Vicryl suture.  Skin incisions were closed using 4-0 subcuticular Vicryl followed by Steri-Strips.    The patient was transferred to the recovery room in satisfactory condition.  Sponge and needle counts were correct at the close of the case.      Specimens:   ID Type Source Tests Collected by Time Destination   1 : Gallbladder and contents Tissue Gallbladder with Stone(s) SURGICAL PATHOLOGY EXAM Marco Almaraz MD 9/9/2023 12:00 PM            Marco Almaraz MD     elevated pressure and very strong family history of glaucoma, will resume Latanoprost at bedtime in both eyes.     Will have patient back in 5 months for a visual field, OCT and dilated eye exam.   Discussed potential side effects of Latanoprost drops such

## 2023-09-17 DIAGNOSIS — I10 HTN (HYPERTENSION), BENIGN: ICD-10-CM

## 2023-09-18 RX ORDER — LOSARTAN POTASSIUM AND HYDROCHLOROTHIAZIDE 12.5; 5 MG/1; MG/1
0.5 TABLET ORAL DAILY
Qty: 45 TABLET | Refills: 0 | Status: SHIPPED | OUTPATIENT
Start: 2023-09-18

## 2023-09-25 RX ORDER — VALACYCLOVIR HYDROCHLORIDE 1 G/1
1000 TABLET, FILM COATED ORAL EVERY 12 HOURS PRN
Qty: 90 TABLET | Refills: 0 | Status: SHIPPED | OUTPATIENT
Start: 2023-09-25 | End: 2023-11-21

## 2023-09-26 ENCOUNTER — OFFICE VISIT (OUTPATIENT)
Dept: SURGERY | Facility: CLINIC | Age: 63
End: 2023-09-26
Payer: COMMERCIAL

## 2023-09-26 VITALS
WEIGHT: 174.69 LBS | BODY MASS INDEX: 26.48 KG/M2 | HEIGHT: 68 IN | SYSTOLIC BLOOD PRESSURE: 126 MMHG | DIASTOLIC BLOOD PRESSURE: 68 MMHG | OXYGEN SATURATION: 98 % | HEART RATE: 57 BPM

## 2023-09-26 DIAGNOSIS — I10 HTN (HYPERTENSION), BENIGN: Primary | ICD-10-CM

## 2023-09-26 DIAGNOSIS — F43.9 STRESS: ICD-10-CM

## 2023-09-26 DIAGNOSIS — E66.3 OVERWEIGHT (BMI 25.0-29.9): ICD-10-CM

## 2023-09-26 DIAGNOSIS — Z51.81 ENCOUNTER FOR THERAPEUTIC DRUG MONITORING: ICD-10-CM

## 2023-09-26 PROCEDURE — 3078F DIAST BP <80 MM HG: CPT | Performed by: INTERNAL MEDICINE

## 2023-09-26 PROCEDURE — 3074F SYST BP LT 130 MM HG: CPT | Performed by: INTERNAL MEDICINE

## 2023-09-26 PROCEDURE — 99214 OFFICE O/P EST MOD 30 MIN: CPT | Performed by: INTERNAL MEDICINE

## 2023-09-26 PROCEDURE — 3008F BODY MASS INDEX DOCD: CPT | Performed by: INTERNAL MEDICINE

## 2023-09-26 RX ORDER — PHENTERMINE HYDROCHLORIDE 37.5 MG/1
37.5 TABLET ORAL
Qty: 30 TABLET | Refills: 2 | Status: SHIPPED | OUTPATIENT
Start: 2023-09-26

## 2023-11-21 ENCOUNTER — TELEPHONE (OUTPATIENT)
Dept: OBGYN CLINIC | Facility: CLINIC | Age: 63
End: 2023-11-21

## 2023-11-21 RX ORDER — VALACYCLOVIR HYDROCHLORIDE 1 G/1
1000 TABLET, FILM COATED ORAL EVERY 12 HOURS PRN
Qty: 90 TABLET | Refills: 1 | Status: SHIPPED | OUTPATIENT
Start: 2023-11-21

## 2023-11-21 NOTE — TELEPHONE ENCOUNTER
Patient is requesting the following refill. She states she finished the 90 that were previously prescribed. valACYclovir 1 G Oral Tab       [facility] #[id number]- [place], [place] - [address]  AT [address] [place] [address], [phone], [phone]

## 2023-11-21 NOTE — TELEPHONE ENCOUNTER
Refill ordered, LVM informed pt. Matilda Klinefelter, MD  You5 minutes ago (12:54 PM)         Yes, thanks     You  Matilda Klinefelter, MD2 hours ago (10:50 AM)     AD  Ok to refill         Called pt using medication daily for suppression therapy needs refill, taking 1,000 mg every 12 hours. Informed will ask EB and call back. Disp Refills Start End    valACYclovir 1 G Oral Tab 90 tablet 0 9/25/2023     Sig - Route: Take 1 tablet (1,000 mg total) by mouth every 12 (twelve) hours as needed.  - Oral    Sent to pharmacy as: valACYclovir HCl 1 GM Oral Tablet (Valtrex)    E-Prescribing Status: Receipt confirmed by pharmacy (9/25/2023 11:57 AM CDT)

## 2024-02-03 DIAGNOSIS — E66.3 OVERWEIGHT (BMI 25.0-29.9): ICD-10-CM

## 2024-02-05 RX ORDER — PHENTERMINE HYDROCHLORIDE 37.5 MG/1
37.5 TABLET ORAL
Qty: 30 TABLET | Refills: 2 | OUTPATIENT
Start: 2024-02-05

## 2024-02-07 ENCOUNTER — OFFICE VISIT (OUTPATIENT)
Dept: SURGERY | Facility: CLINIC | Age: 64
End: 2024-02-07
Payer: COMMERCIAL

## 2024-02-07 VITALS
OXYGEN SATURATION: 96 % | HEART RATE: 73 BPM | HEIGHT: 68 IN | SYSTOLIC BLOOD PRESSURE: 128 MMHG | BODY MASS INDEX: 26.01 KG/M2 | DIASTOLIC BLOOD PRESSURE: 78 MMHG | WEIGHT: 171.63 LBS

## 2024-02-07 DIAGNOSIS — Z51.81 ENCOUNTER FOR THERAPEUTIC DRUG MONITORING: ICD-10-CM

## 2024-02-07 DIAGNOSIS — I10 HTN (HYPERTENSION), BENIGN: Primary | ICD-10-CM

## 2024-02-07 DIAGNOSIS — E66.3 OVERWEIGHT (BMI 25.0-29.9): ICD-10-CM

## 2024-02-07 DIAGNOSIS — F43.9 STRESS: ICD-10-CM

## 2024-02-07 PROCEDURE — 99214 OFFICE O/P EST MOD 30 MIN: CPT | Performed by: INTERNAL MEDICINE

## 2024-02-07 RX ORDER — PHENTERMINE HYDROCHLORIDE 37.5 MG/1
37.5 TABLET ORAL
Qty: 30 TABLET | Refills: 2 | Status: SHIPPED | OUTPATIENT
Start: 2024-02-07

## 2024-02-07 NOTE — PROGRESS NOTES
[facility], [place]  [address]  [facility] [address]  Dept: [phone]       Patient:  Abigail Andrade  :      1960  MRN:      [id number]    Chief Complaint:    Chief Complaint   Patient presents with    Follow - Up     Weight check        SUBJECTIVE     History of Present Illness:  Abigail is being seen today for a follow-up for non surgical weight loss.     Past Medical History:   Past Medical History:   Diagnosis Date    Essential hypertension     Fibroids     Genital herpes simplex     Glaucoma 17 1st visit- was on Cosopt bid OU and Latanoprost qhs OU- 17 normal OCT and VF, so D/C Cosopt, but continue Latanoprost qhs OU, 17- stable IOP, so D/C Latanoprost qhs OU, 18 RESUME LATANOPROST QHS OU DUE TO IOP OF 22/26 AND PT CONCERN    Herpes simplex     vaginal    Human papilloma virus infection     Hypothyroidism     Neutropenia (HCC)     saw hematologist 2 years ago, told was nl, from family     Obesity (BMI 30-39.9)     Vaginal dryness, menopausal         Comorbidities:  Back pain-Improvement?  yes, Joint pain-Improvement?  yes, Hypertension-Improvement?  yes and SHUKRI-Improvement?  yes    OBJECTIVE     Vitals: /78   Pulse 73   Ht 5' 8\" (1.727 m)   Wt 171 lb 9.6 oz (77.8 kg)   LMP 10/01/2013 (Within Weeks)   SpO2 96%   BMI 26.09 kg/m²     Initial weight loss:-03   Total weight loss: -302   Start weight: 203    Wt Readings from Last 3 Encounters:   24 171 lb 9.6 oz (77.8 kg)   23 174 lb 11.2 oz (79.2 kg)   23 180 lb 4.8 oz (81.8 kg)       Patient Medications:    Current Outpatient Medications   Medication Sig Dispense Refill    valACYclovir 1 G Oral Tab Take 1 tablet (1,000 mg total) by mouth every 12 (twelve) hours as needed. 90 tablet 1    Phentermine HCl 37.5 MG Oral Tab Take 1 tablet (37.5 mg total) by mouth every morning before breakfast. 30 tablet 2     losartan-hydroCHLOROthiazide 50-12.5 MG Oral Tab Take 0.5 tablets by mouth daily. Physical due 2023 45 tablet 0    levocetirizine 5 MG Oral Tab Take 1 tablet (5 mg total) by mouth every evening. 90 tablet 3    dorzolamide 2 % Ophthalmic Solution Apply 1 drop to eye As Directed.      DICLOFENAC 1 % External Gel APPLY 2 GRAMS TOPICALLY 3 TIMES A DAY AS NEEDED 200 g 0    SYNTHROID 75 MCG Oral Tab        Allergies:  Patient has no known allergies.     Social History:    Social History     Socioeconomic History    Marital status: Single     Spouse name: Not on file    Number of children: Not on file    Years of education: Not on file    Highest education level: Not on file   Occupational History    Not on file   Tobacco Use    Smoking status: Never    Smokeless tobacco: Never   Vaping Use    Vaping Use: Never used   Substance and Sexual Activity    Alcohol use: No     Alcohol/week: 0.0 standard drinks of alcohol    Drug use: No    Sexual activity: Not Currently   Other Topics Concern    Caffeine Concern Yes     Comment: black tea, 1 cup and expresso beans    Exercise Yes     Comment: exercises daily    Seat Belt Not Asked    Special Diet Not Asked    Stress Concern Not Asked    Weight Concern Not Asked   Social History Narrative    Lives alone     No abuse     Social Determinants of Health     Financial Resource Strain: Not on file   Food Insecurity: Not on file   Transportation Needs: Not on file   Physical Activity: Not on file   Stress: Not on file   Social Connections: Not on file   Housing Stability: Not on file     Surgical History:    Past Surgical History:   Procedure Laterality Date    COLPOSCOPY, CERVIX W/UPPER ADJACENT VAGINA; W/BIOPSY(S), CERVIX      EXAM OF VAGINA,COLPOSCOPY      positive HPV          REMOVAL OF FALLOPIAN TUBE Bilateral 2013    ovaries appeared normal    TUBAL LIGATION       Family History:    Family History   Problem Relation Age of Onset    Other (Other) Father          liver cirrhosis    Hypertension Mother     Glaucoma Mother     Heart Disorder Mother     Other (Other) Mother     Glaucoma Sister     Other (Other) Sister         Colon polyps    Heart Disorder Sister         stent placement    Diabetes Neg     Macular degeneration Neg     Cancer Neg     Bleeding Disorders Neg     Clotting Disorder Neg        Food Journal  Reviewed and Discussed:       Patient has a Food Journal?: yes   Patient is reading nutrition labels?  yes  Average Caloric Intake:     Average CHO Intake: 100  Is patient exercising? yes  Type of exercise? Walking  Weights at home    Eating Habits  Patient states the following:  Eats 3 meal(s) per day  Length of time it takes to consume a meal:  20  # of snacks per day: 1 Type of snacks:  Snap peas,  fruit  Amount of soda consumption per day:    Amount of water (in ounces) per day:  64  Drinking between meals only:  yes  Toughest challenge:    Nutritional Goals  Limit carbohydrates to 100 gms per day, Eat 100-200 calories within 1 hour of waking  and Eat 3-4 cups of fresh fruits or vegetables daily    Behavior Modifications Reviewed and Discussed  Eat breakfast, Eat 3 meals per day, Plan meals in advance, Read nutrition labels, Drink 64 oz of water per day, Maintain a daily food journal, No drinking 30 minutes before or after meals, Utlize portion control strategies to reduce calorie intake, Identify triggers for eating and manage cues and Eat slowly and take 20 to 30 minutes to complete each meal    Exercise Goals Reviewed and Discussed        ROS:    Constitutional: negative  Respiratory: negative  Cardiovascular: negative  Gastrointestinal: negative  Musculoskeletal:positive for arthralgias and back pain  Neurological: negative  Behavioral/Psych: negative  Endocrine: negative  All other systems were reviewed and are negative    Physical Exam:   General appearance: alert, appears stated age, cooperative and mildly obese  Head: Normocephalic, without obvious  abnormality, atraumatic  Back: symmetric, no curvature. ROM normal. No CVA tenderness.  Lungs: clear to auscultation bilaterally  Heart: S1, S2 normal, no murmur, click, rub or gallop, regular rate and rhythm  Abdomen: soft, non-tender; bowel sounds normal; no masses,  no organomegaly  Extremities: extremities normal, atraumatic, no cyanosis or edema  Pulses: 2+ and symmetric  Neurologic: Grossly normal    ASSESSMENT     HYPERTENSION:  The patient's blood pressure has been well controlled.  she has been checking it as instructed and has remained in relatively good control.    Encounter Diagnosis(ses):   Encounter Diagnoses   Name Primary?    HTN (hypertension), benign Yes    Stress     Encounter for therapeutic drug monitoring     Overweight (BMI 25.0-29.9)        PLAN     Patient is not interested in bariatric surgery. Patient desires to pursue traditional weight loss at this time.      HYPERTENSION: Blood pressure stable on the above medications. No interval change in antihypertensive medication.     Goals for next month:  1. Keep a food log.  2. Drink 48-64 ounces of non-caloric beverages per day. No fruit juices or regular soda.  3. Increase activity-upper body exercises, walk 10 minutes per day.  4. Increase fruit and vegetable servings to 5-6 per day.      Tolerating Phentermine  ekg due    Wegovy not covered    Needs to follow up with PCP  Needs annual blood work      Diagnoses and all orders for this visit:    HTN (hypertension), benign    Stress    Encounter for therapeutic drug monitoring    Overweight (BMI 25.0-29.9)          Daniel Mack MD

## 2024-02-29 ENCOUNTER — OFFICE VISIT (OUTPATIENT)
Dept: FAMILY MEDICINE CLINIC | Facility: CLINIC | Age: 64
End: 2024-02-29

## 2024-02-29 VITALS
SYSTOLIC BLOOD PRESSURE: 130 MMHG | BODY MASS INDEX: 26 KG/M2 | WEIGHT: 171 LBS | RESPIRATION RATE: 18 BRPM | HEART RATE: 61 BPM | OXYGEN SATURATION: 98 % | TEMPERATURE: 98 F | DIASTOLIC BLOOD PRESSURE: 84 MMHG

## 2024-02-29 DIAGNOSIS — J30.9 ALLERGIC RHINITIS, UNSPECIFIED SEASONALITY, UNSPECIFIED TRIGGER: ICD-10-CM

## 2024-02-29 DIAGNOSIS — J02.9 SORE THROAT: ICD-10-CM

## 2024-02-29 DIAGNOSIS — R09.82 POST-NASAL DRIP: ICD-10-CM

## 2024-02-29 DIAGNOSIS — I10 HTN (HYPERTENSION), BENIGN: ICD-10-CM

## 2024-02-29 DIAGNOSIS — R49.0 HOARSENESS: ICD-10-CM

## 2024-02-29 DIAGNOSIS — Z12.31 ENCOUNTER FOR SCREENING MAMMOGRAM FOR BREAST CANCER: Primary | ICD-10-CM

## 2024-02-29 RX ORDER — LEVOCETIRIZINE DIHYDROCHLORIDE 5 MG/1
5 TABLET, FILM COATED ORAL EVERY EVENING
Qty: 30 TABLET | Refills: 1 | Status: SHIPPED | OUTPATIENT
Start: 2024-02-29

## 2024-02-29 RX ORDER — LATANOPROST 50 UG/ML
SOLUTION/ DROPS OPHTHALMIC
COMMUNITY
Start: 2023-12-29

## 2024-02-29 NOTE — PATIENT INSTRUCTIONS
Monitor blood pressures and record at home. Limit salt intake.  Mammogram order placed.  Increase water intake.  Medication reviewed and renewed where needed and appropriate.

## 2024-03-09 NOTE — PROGRESS NOTES
Subjective:     Patient ID: Abigail Andrade is a 63 year old female.    The following information has been communicated to the allergist who will likely make an assessment of this 63-year-old -American female:    Please evaluate this 63-year-old -American female with allergic rhinitis and postnasal drainage.  There is a very good chance that she is atopic and allergy testing may be beneficial.    Patient is also due for mammogram.  Order has been placed on the chart.    Patient needs a return visit for complete annual exam.        History/Other:   Review of Systems  Current Outpatient Medications   Medication Sig Dispense Refill    latanoprost 0.005 % Ophthalmic Solution       levocetirizine 5 MG Oral Tab Take 1 tablet (5 mg total) by mouth every evening. 30 tablet 1    Phentermine HCl 37.5 MG Oral Tab Take 1 tablet (37.5 mg total) by mouth every morning before breakfast. 30 tablet 2    valACYclovir 1 G Oral Tab Take 1 tablet (1,000 mg total) by mouth every 12 (twelve) hours as needed. 90 tablet 1    losartan-hydroCHLOROthiazide 50-12.5 MG Oral Tab Take 0.5 tablets by mouth daily. Physical due nov 2023 45 tablet 0    levocetirizine 5 MG Oral Tab Take 1 tablet (5 mg total) by mouth every evening. 90 tablet 3    dorzolamide 2 % Ophthalmic Solution Apply 1 drop to eye As Directed.      DICLOFENAC 1 % External Gel APPLY 2 GRAMS TOPICALLY 3 TIMES A DAY AS NEEDED 200 g 0    SYNTHROID 75 MCG Oral Tab        Allergies:No Known Allergies    Past Medical History:   Diagnosis Date    Essential hypertension     Fibroids     Genital herpes simplex     Glaucoma 2006 6/20/17 1st visit- was on Cosopt bid OU and Latanoprost qhs OU- 6/17/17 normal OCT and VF, so D/C Cosopt, but continue Latanoprost qhs OU, 9/16/17- stable IOP, so D/C Latanoprost qhs OU, 1/17/18 RESUME LATANOPROST QHS OU DUE TO IOP OF 22/26 AND PT CONCERN    Herpes simplex 2005    vaginal    Human papilloma virus infection     Hypothyroidism      Neutropenia (HCC)     saw hematologist 2 years ago, told was nl, from family     Obesity (BMI 30-39.9)     Vaginal dryness, menopausal       Past Surgical History:   Procedure Laterality Date    COLPOSCOPY, CERVIX W/UPPER ADJACENT VAGINA; W/BIOPSY(S), CERVIX      EXAM OF VAGINA,COLPOSCOPY      positive HPV          REMOVAL OF FALLOPIAN TUBE Bilateral 2013    ovaries appeared normal    TUBAL LIGATION        Family History   Problem Relation Age of Onset    Other (Other) Father         liver cirrhosis    Hypertension Mother     Glaucoma Mother     Heart Disorder Mother     Other (Other) Mother     Glaucoma Sister     Other (Other) Sister         Colon polyps    Heart Disorder Sister         stent placement    Diabetes Neg     Macular degeneration Neg     Cancer Neg     Bleeding Disorders Neg     Clotting Disorder Neg       Social History:   Social History     Socioeconomic History    Marital status: Single   Tobacco Use    Smoking status: Never    Smokeless tobacco: Never   Vaping Use    Vaping Use: Never used   Substance and Sexual Activity    Alcohol use: No     Alcohol/week: 0.0 standard drinks of alcohol    Drug use: No    Sexual activity: Not Currently   Other Topics Concern    Caffeine Concern Yes     Comment: black tea, 1 cup and expresso beans    Exercise Yes     Comment: exercises daily   Social History Narrative    Lives alone     No abuse        Objective:   Vitals:    24 0831   BP: 130/84   Pulse: 61   Resp: 18   Temp: 98.1 °F (36.7 °C)       Physical Exam  Constitutional:       Appearance: Normal appearance.   HENT:      Head: Normocephalic and atraumatic.      Right Ear: Tympanic membrane normal.      Left Ear: Tympanic membrane normal.      Nose: Congestion present.      Mouth/Throat:      Mouth: Mucous membranes are moist.      Comments: Posterior pharyngeal cobblestoning  Cardiovascular:      Rate and Rhythm: Normal rate and regular rhythm.      Heart sounds:      No gallop.    Pulmonary:      Effort: Pulmonary effort is normal.      Breath sounds: Normal breath sounds.   Neurological:      Mental Status: She is alert.   Psychiatric:         Mood and Affect: Mood normal.         Assessment & Plan:   1. Sore throat  Likely throat irritation secondary to continuous postnasal drainage    2. Hoarseness  Referred  - Allergy Referral - In Network    3. Allergic rhinitis, unspecified seasonality, unspecified trigger  Referred and the following antihistamine prescribed.  - Allergy Referral - In Network  - levocetirizine 5 MG Oral Tab; Take 1 tablet (5 mg total) by mouth every evening.  Dispense: 30 tablet; Refill: 1    4. Post-nasal drip  Same as #3.  - Allergy Referral - In Network  - levocetirizine 5 MG Oral Tab; Take 1 tablet (5 mg total) by mouth every evening.  Dispense: 30 tablet; Refill: 1    5. Encounter for screening mammogram for breast cancer  Ordered.  - [facility] MARGOTH 2D+3D SCREENING BILAT (CPT=77067/82777); Future    6. HTN (hypertension), benign  By manual measure as the patient to goal.  Medication compliance emphasized and encouraged.      No orders of the defined types were placed in this encounter.      Meds This Visit:  Requested Prescriptions     Signed Prescriptions Disp Refills    levocetirizine 5 MG Oral Tab 30 tablet 1     Sig: Take 1 tablet (5 mg total) by mouth every evening.       Imaging & Referrals:  ALLERGY - INTERNAL  [facility] MARGOTH 2D+3D SCREENING BILAT (CPT=77067/82377)     Patient Instructions   Monitor blood pressures and record at home. Limit salt intake.  Mammogram order placed.  Increase water intake.  Medication reviewed and renewed where needed and appropriate.    Return if symptoms worsen or fail to improve.

## 2024-03-23 DIAGNOSIS — R09.82 POST-NASAL DRIP: ICD-10-CM

## 2024-03-23 DIAGNOSIS — J30.9 ALLERGIC RHINITIS, UNSPECIFIED SEASONALITY, UNSPECIFIED TRIGGER: ICD-10-CM

## 2024-03-23 NOTE — TELEPHONE ENCOUNTER
levocetirizine 5 MG Oral Tab, Take 1 tablet (5 mg total) by mouth every evening., Disp: 90 tablet, Rfl: 3

## 2024-03-25 RX ORDER — LEVOCETIRIZINE DIHYDROCHLORIDE 5 MG/1
5 TABLET, FILM COATED ORAL EVERY EVENING
Qty: 90 TABLET | Refills: 3 | Status: SHIPPED | OUTPATIENT
Start: 2024-03-25

## 2024-03-25 NOTE — TELEPHONE ENCOUNTER
Refill passed per [facility] protocol.    Requested Prescriptions   Pending Prescriptions Disp Refills    levocetirizine 5 MG Oral Tab 90 tablet 3     Sig: Take 1 tablet (5 mg total) by mouth every evening.       Allergy Medication Protocol Passed - 3/25/2024  4:34 PM        Passed - In person appointment or virtual visit in the past 12 mos or appointment in next 3 mos     Recent Outpatient Visits              3 weeks ago Encounter for screening mammogram for breast cancer    [facility] Brendan Hickey DO    Office Visit    [facility] month [facility]    [facility]    6 months ago HTN (hypertension), [facility] Daniel Sauceda MD    Office Visit    10 months ago [facility]    Office Visit    12 months ago Sebaceous cyst of labia    [facility] - OB/GYN Biester, Neha PARNELL MD    Office Visit          Future Appointments         Provider Department Appt Notes    In 1 month Frantz Slater MD [facility] seasonal allergies  No Antihistamines  Policy advised  Patient advised not to take antihistamines 5 days prior to appt for poss same day testing.    In 4 months [facility]                    Future Appointments         Provider Department Appt Notes    In 1 month Frantz Slater MD [facility] seasonal allergies  No Antihistamines  Policy advised  Patient advised not to take antihistamines 5 days prior to appt for poss same day testing.    [facility]            Recent  Outpatient Visits              3 weeks ago Encounter for screening mammogram for breast cancer    [facility] Brendan Hickey DO    Office Visit    1 month ago [facility]    [facility]    [facility]    [facility]    10 months [facility]    [facility]    12 months ago Sebaceous cyst of labia    [facility] - OB/GYN Neha Piedra MD    Office Visit

## 2024-03-25 NOTE — TELEPHONE ENCOUNTER
Called patient and she does not need refill right now, she is saying we can decline it for now. She generally uses and prefers the [facility] in [place] as pharmacy of choice.

## 2024-03-25 NOTE — TELEPHONE ENCOUNTER
Dear Nursing Staff,    Please verify with patient   RX request was from [facility]. Last RX was sent 02/29/24 was sent to [facility] in [place]

## 2024-03-26 ENCOUNTER — HOSPITAL ENCOUNTER (OUTPATIENT)
Dept: MAMMOGRAPHY | Age: 64
Discharge: HOME | End: 2024-03-26
Attending: FAMILY MEDICINE
Payer: COMMERCIAL

## 2024-03-26 DIAGNOSIS — Z12.31 ENCOUNTER FOR SCREENING MAMMOGRAM FOR BREAST CANCER: ICD-10-CM

## 2024-03-26 PROCEDURE — 77063 BREAST TOMOSYNTHESIS BI: CPT | Performed by: FAMILY MEDICINE

## 2024-03-26 PROCEDURE — 77067 SCR MAMMO BI INCL CAD: CPT | Performed by: FAMILY MEDICINE

## 2024-04-01 ENCOUNTER — HOSPITAL ENCOUNTER (OUTPATIENT)
Dept: ULTRASOUND IMAGING | Facility: HOSPITAL | Age: 64
Discharge: HOME | End: 2024-04-01
Attending: FAMILY MEDICINE
Payer: COMMERCIAL

## 2024-04-01 ENCOUNTER — HOSPITAL ENCOUNTER (OUTPATIENT)
Dept: MAMMOGRAPHY | Facility: HOSPITAL | Age: 64
Discharge: HOME | End: 2024-04-01
Attending: FAMILY MEDICINE
Payer: COMMERCIAL

## 2024-04-01 DIAGNOSIS — R92.8 ABNORMAL MAMMOGRAM: ICD-10-CM

## 2024-04-01 LAB — HM MAMMOGRAPHY BILATERAL: NORMAL

## 2024-04-01 PROCEDURE — 77062 BREAST TOMOSYNTHESIS BI: CPT | Performed by: FAMILY MEDICINE

## 2024-04-01 PROCEDURE — 76642 ULTRASOUND BREAST LIMITED: CPT | Performed by: FAMILY MEDICINE

## 2024-04-01 PROCEDURE — 77066 DX MAMMO INCL CAD BI: CPT | Performed by: FAMILY MEDICINE

## 2024-04-03 DIAGNOSIS — R92.8 ABNORMAL MAMMOGRAM OF BOTH BREASTS: Primary | ICD-10-CM

## 2024-04-04 ENCOUNTER — EKG ENCOUNTER (OUTPATIENT)
Dept: LAB | Facility: HOSPITAL | Age: 64
End: 2024-04-04
Attending: INTERNAL MEDICINE
Payer: COMMERCIAL

## 2024-04-04 DIAGNOSIS — E66.3 OVERWEIGHT (BMI 25.0-29.9): ICD-10-CM

## 2024-04-04 DIAGNOSIS — Z51.81 ENCOUNTER FOR THERAPEUTIC DRUG MONITORING: ICD-10-CM

## 2024-04-04 DIAGNOSIS — I10 HTN (HYPERTENSION), BENIGN: ICD-10-CM

## 2024-04-04 DIAGNOSIS — F43.9 STRESS: ICD-10-CM

## 2024-04-04 PROCEDURE — 93010 ELECTROCARDIOGRAM REPORT: CPT | Performed by: STUDENT IN AN ORGANIZED HEALTH CARE EDUCATION/TRAINING PROGRAM

## 2024-04-04 PROCEDURE — 93005 ELECTROCARDIOGRAM TRACING: CPT

## 2024-04-05 LAB
ATRIAL RATE: 68 BPM
P AXIS: 58 DEGREES
P-R INTERVAL: 126 MS
Q-T INTERVAL: 416 MS
QRS DURATION: 64 MS
QTC CALCULATION (BEZET): 442 MS
R AXIS: 14 DEGREES
T AXIS: 39 DEGREES
VENTRICULAR RATE: 68 BPM

## 2024-05-22 DIAGNOSIS — E66.3 OVERWEIGHT (BMI 25.0-29.9): ICD-10-CM

## 2024-05-22 RX ORDER — PHENTERMINE HYDROCHLORIDE 37.5 MG/1
37.5 TABLET ORAL
Qty: 30 TABLET | Refills: 0 | Status: SHIPPED | OUTPATIENT
Start: 2024-05-22

## 2024-06-13 ENCOUNTER — CLINICAL ABSTRACT (OUTPATIENT)
Dept: OTHER | Age: 64
End: 2024-06-13

## 2024-06-24 DIAGNOSIS — E66.3 OVERWEIGHT (BMI 25.0-29.9): ICD-10-CM

## 2024-06-25 RX ORDER — PHENTERMINE HYDROCHLORIDE 37.5 MG/1
37.5 TABLET ORAL
Qty: 30 TABLET | Refills: 0 | Status: SHIPPED | OUTPATIENT
Start: 2024-06-25

## 2024-07-30 DIAGNOSIS — E66.3 OVERWEIGHT (BMI 25.0-29.9): ICD-10-CM

## 2024-07-30 RX ORDER — PHENTERMINE HYDROCHLORIDE 37.5 MG/1
37.5 TABLET ORAL
Qty: 30 TABLET | Refills: 0 | Status: SHIPPED | OUTPATIENT
Start: 2024-07-30

## 2024-08-08 ENCOUNTER — TELEPHONE (OUTPATIENT)
Dept: FAMILY MEDICINE CLINIC | Facility: CLINIC | Age: 64
End: 2024-08-08

## 2024-08-08 ENCOUNTER — LAB ENCOUNTER (OUTPATIENT)
Dept: LAB | Age: 64
End: 2024-08-08
Attending: FAMILY MEDICINE
Payer: COMMERCIAL

## 2024-08-08 ENCOUNTER — OFFICE VISIT (OUTPATIENT)
Dept: FAMILY MEDICINE CLINIC | Facility: CLINIC | Age: 64
End: 2024-08-08
Payer: COMMERCIAL

## 2024-08-08 VITALS
TEMPERATURE: 98 F | SYSTOLIC BLOOD PRESSURE: 127 MMHG | WEIGHT: 168 LBS | RESPIRATION RATE: 18 BRPM | OXYGEN SATURATION: 99 % | BODY MASS INDEX: 25.46 KG/M2 | DIASTOLIC BLOOD PRESSURE: 74 MMHG | HEART RATE: 81 BPM | HEIGHT: 68 IN

## 2024-08-08 DIAGNOSIS — Z00.00 ROUTINE PHYSICAL EXAMINATION: ICD-10-CM

## 2024-08-08 DIAGNOSIS — Z00.00 ROUTINE PHYSICAL EXAMINATION: Primary | ICD-10-CM

## 2024-08-08 DIAGNOSIS — Z82.49 FAMILY HISTORY OF EARLY CAD: ICD-10-CM

## 2024-08-08 DIAGNOSIS — I10 HTN (HYPERTENSION), BENIGN: ICD-10-CM

## 2024-08-08 DIAGNOSIS — R09.82 POST-NASAL DRIP: ICD-10-CM

## 2024-08-08 DIAGNOSIS — M79.601 RIGHT ARM PAIN: ICD-10-CM

## 2024-08-08 DIAGNOSIS — J30.9 ALLERGIC RHINITIS, UNSPECIFIED SEASONALITY, UNSPECIFIED TRIGGER: ICD-10-CM

## 2024-08-08 LAB
ALBUMIN SERPL-MCNC: 4.3 G/DL (ref 3.2–4.8)
ALBUMIN/GLOB SERPL: 1.4 {RATIO} (ref 1–2)
ALP LIVER SERPL-CCNC: 66 U/L
ALT SERPL-CCNC: 11 U/L
ANION GAP SERPL CALC-SCNC: 5 MMOL/L (ref 0–18)
AST SERPL-CCNC: 21 U/L (ref ?–34)
BASOPHILS # BLD AUTO: 0.06 X10(3) UL (ref 0–0.2)
BASOPHILS NFR BLD AUTO: 1.8 %
BILIRUB SERPL-MCNC: 0.6 MG/DL (ref 0.2–1.1)
BILIRUB UR QL: NEGATIVE
BUN BLD-MCNC: 9 MG/DL (ref 9–23)
BUN/CREAT SERPL: 9.2 (ref 10–20)
CALCIUM BLD-MCNC: 9.5 MG/DL (ref 8.7–10.4)
CHLORIDE SERPL-SCNC: 108 MMOL/L (ref 98–112)
CHOLEST SERPL-MCNC: 141 MG/DL (ref ?–200)
CLARITY UR: CLEAR
CO2 SERPL-SCNC: 29 MMOL/L (ref 21–32)
COLOR UR: YELLOW
CREAT BLD-MCNC: 0.98 MG/DL
DEPRECATED RDW RBC AUTO: 46.3 FL (ref 35.1–46.3)
EGFRCR SERPLBLD CKD-EPI 2021: 65 ML/MIN/1.73M2 (ref 60–?)
EOSINOPHIL # BLD AUTO: 0.09 X10(3) UL (ref 0–0.7)
EOSINOPHIL NFR BLD AUTO: 2.7 %
ERYTHROCYTE [DISTWIDTH] IN BLOOD BY AUTOMATED COUNT: 13.5 % (ref 11–15)
FASTING PATIENT LIPID ANSWER: YES
FASTING STATUS PATIENT QL REPORTED: YES
GLOBULIN PLAS-MCNC: 3.1 G/DL (ref 2–3.5)
GLUCOSE BLD-MCNC: 90 MG/DL (ref 70–99)
GLUCOSE UR-MCNC: NORMAL MG/DL
HCT VFR BLD AUTO: 40.2 %
HDLC SERPL-MCNC: 66 MG/DL (ref 40–59)
HGB BLD-MCNC: 13.1 G/DL
HGB UR QL STRIP.AUTO: NEGATIVE
IMM GRANULOCYTES # BLD AUTO: 0.01 X10(3) UL (ref 0–1)
IMM GRANULOCYTES NFR BLD: 0.3 %
KETONES UR-MCNC: NEGATIVE MG/DL
LDLC SERPL CALC-MCNC: 61 MG/DL (ref ?–100)
LEUKOCYTE ESTERASE UR QL STRIP.AUTO: 25
LYMPHOCYTES # BLD AUTO: 1.38 X10(3) UL (ref 1–4)
LYMPHOCYTES NFR BLD AUTO: 41.6 %
MCH RBC QN AUTO: 30.3 PG (ref 26–34)
MCHC RBC AUTO-ENTMCNC: 32.6 G/DL (ref 31–37)
MCV RBC AUTO: 92.8 FL
MONOCYTES # BLD AUTO: 0.38 X10(3) UL (ref 0.1–1)
MONOCYTES NFR BLD AUTO: 11.4 %
NEUTROPHILS # BLD AUTO: 1.4 X10 (3) UL (ref 1.5–7.7)
NEUTROPHILS # BLD AUTO: 1.4 X10(3) UL (ref 1.5–7.7)
NEUTROPHILS NFR BLD AUTO: 42.2 %
NITRITE UR QL STRIP.AUTO: NEGATIVE
NONHDLC SERPL-MCNC: 75 MG/DL (ref ?–130)
OSMOLALITY SERPL CALC.SUM OF ELEC: 292 MOSM/KG (ref 275–295)
PH UR: 5.5 [PH] (ref 5–8)
PLATELET # BLD AUTO: 253 10(3)UL (ref 150–450)
POTASSIUM SERPL-SCNC: 4.1 MMOL/L (ref 3.5–5.1)
PROT SERPL-MCNC: 7.4 G/DL (ref 5.7–8.2)
PROT UR-MCNC: 20 MG/DL
RBC # BLD AUTO: 4.33 X10(6)UL
SODIUM SERPL-SCNC: 142 MMOL/L (ref 136–145)
SP GR UR STRIP: 1.03 (ref 1–1.03)
TRIGL SERPL-MCNC: 67 MG/DL (ref 30–149)
TSI SER-ACNC: 2.61 MIU/ML (ref 0.55–4.78)
UROBILINOGEN UR STRIP-ACNC: NORMAL
VLDLC SERPL CALC-MCNC: 10 MG/DL (ref 0–30)
WBC # BLD AUTO: 3.3 X10(3) UL (ref 4–11)

## 2024-08-08 PROCEDURE — 80053 COMPREHEN METABOLIC PANEL: CPT

## 2024-08-08 PROCEDURE — 36415 COLL VENOUS BLD VENIPUNCTURE: CPT

## 2024-08-08 PROCEDURE — 80061 LIPID PANEL: CPT

## 2024-08-08 PROCEDURE — 81001 URINALYSIS AUTO W/SCOPE: CPT

## 2024-08-08 PROCEDURE — 85025 COMPLETE CBC W/AUTO DIFF WBC: CPT

## 2024-08-08 PROCEDURE — 84443 ASSAY THYROID STIM HORMONE: CPT

## 2024-08-08 PROCEDURE — 99396 PREV VISIT EST AGE 40-64: CPT | Performed by: FAMILY MEDICINE

## 2024-08-08 NOTE — PATIENT INSTRUCTIONS
All adult screening ordered and done appropriate for patient's age and gender and risk factors and complaints.  Encouraged physical fitness and daily physical activity daily.  Monitor blood pressures and record at home. Limit salt intake.  FMLA to be completed regarding the patient's chronic right upper extremity pain.  Patient to be [employer] intermittent leave 4 days and 30-day.  To be utilized consecutively or intermittently as deemed necessary.  Form completed for compression stockings.  Patient is at occupational risk for development blood clots.

## 2024-08-08 NOTE — TELEPHONE ENCOUNTER
Patient brought in fmla forms. IVETTE was signed. Fee was paid. Forms were emailed and sent to forms dept. PT would like a call when forms are faxed over.

## 2024-08-09 ENCOUNTER — OFFICE VISIT (OUTPATIENT)
Dept: SURGERY | Facility: CLINIC | Age: 64
End: 2024-08-09
Payer: COMMERCIAL

## 2024-08-09 VITALS
HEIGHT: 68 IN | SYSTOLIC BLOOD PRESSURE: 128 MMHG | HEART RATE: 86 BPM | DIASTOLIC BLOOD PRESSURE: 70 MMHG | BODY MASS INDEX: 25.76 KG/M2 | OXYGEN SATURATION: 93 % | WEIGHT: 170 LBS

## 2024-08-09 DIAGNOSIS — I10 HTN (HYPERTENSION), BENIGN: Primary | ICD-10-CM

## 2024-08-09 DIAGNOSIS — Z51.81 ENCOUNTER FOR THERAPEUTIC DRUG MONITORING: ICD-10-CM

## 2024-08-09 DIAGNOSIS — F43.9 STRESS: ICD-10-CM

## 2024-08-09 DIAGNOSIS — E66.3 OVERWEIGHT (BMI 25.0-29.9): ICD-10-CM

## 2024-08-09 PROCEDURE — 99214 OFFICE O/P EST MOD 30 MIN: CPT | Performed by: INTERNAL MEDICINE

## 2024-08-09 RX ORDER — PHENTERMINE HYDROCHLORIDE 37.5 MG/1
37.5 TABLET ORAL
Qty: 30 TABLET | Refills: 5 | Status: SHIPPED | OUTPATIENT
Start: 2024-08-29

## 2024-08-09 NOTE — PROGRESS NOTES
[facility] [place], [facility], [place]  [address]  [facility] [address]  Dept: [phone]       Patient:  Abigail Andrade  :      1960  MRN:      [id number]    Chief Complaint:    Chief Complaint   Patient presents with    Follow - Up    Weight Management       SUBJECTIVE     History of Present Illness:  Abigail is being seen today for a follow-up for non surgical weight loss.     Past Medical History:   Past Medical History:    Essential hypertension    Fibroids    Genital herpes simplex    Glaucoma    17 1st visit- was on Cosopt bid OU and Latanoprost qhs OU- 17 normal OCT and VF, so D/C Cosopt, but continue Latanoprost qhs OU, 17- stable IOP, so D/C Latanoprost qhs OU, 18 RESUME LATANOPROST QHS OU DUE TO IOP OF 22/26 AND PT CONCERN    Herpes simplex    vaginal    Human papilloma virus infection    Hypothyroidism    Neutropenia (HCC)    saw hematologist 2 years ago, told was nl, from family     Obesity (BMI 30-39.9)    Vaginal dryness, menopausal        Comorbidities:  Back pain-Improvement?  yes, Joint pain-Improvement?  yes, Hypertension-Improvement?  yes and SHUKRI-Improvement?  yes    OBJECTIVE     Vitals: /70 (BP Location: Right arm, Patient Position: Sitting, Cuff Size: adult)   Pulse 86   Ht 5' 8\" (1.727 m)   Wt 170 lb (77.1 kg)   LMP 10/01/2013 (Within Weeks)   SpO2 93%   BMI 25.85 kg/m²     Initial weight loss:-01   Total weight loss: -170   Start weight: 203    Wt Readings from Last 3 Encounters:   24 170 lb (77.1 kg)   24 168 lb (76.2 kg)   24 171 lb (77.6 kg)       Patient Medications:    Current Outpatient Medications   Medication Sig Dispense Refill    PHENTERMINE HCL 37.5 MG Oral Tab TAKE 1 TABLET BY MOUTH EVERY MORNING WITH BREAKFAST (Patient not taking: Reported on 2024) 30 tablet 0    levocetirizine 5 MG Oral Tab Take 1 tablet (5 mg total) by mouth every evening. 90 tablet 3     latanoprost 0.005 % Ophthalmic Solution       valACYclovir 1 G Oral Tab Take 1 tablet (1,000 mg total) by mouth every 12 (twelve) hours as needed. (Patient not taking: Reported on 8/8/2024) 90 tablet 1    losartan-hydroCHLOROthiazide 50-12.5 MG Oral Tab Take 0.5 tablets by mouth daily. Physical due nov 2023 45 tablet 0    dorzolamide 2 % Ophthalmic Solution Apply 1 drop to eye As Directed.      DICLOFENAC 1 % External Gel APPLY 2 GRAMS TOPICALLY 3 TIMES A DAY AS NEEDED 200 g 0    SYNTHROID 75 MCG Oral Tab        Allergies:  Dust mite extract     Social History:    Social History     Socioeconomic History    Marital status: Single     Spouse name: Not on file    Number of children: Not on file    Years of education: Not on file    Highest education level: Not on file   Occupational History    Not on file   Tobacco Use    Smoking status: Never    Smokeless tobacco: Never   Vaping Use    Vaping status: Never Used   Substance and Sexual Activity    Alcohol use: No     Alcohol/week: 0.0 standard drinks of alcohol    Drug use: No    Sexual activity: Not Currently   Other Topics Concern    Caffeine Concern Yes     Comment: black tea, 1 cup and expresso beans    Exercise Yes     Comment: exercises daily    Seat Belt Not Asked    Special Diet Not Asked    Stress Concern Not Asked    Weight Concern Not Asked   Social History Narrative    Lives alone     No abuse     Social Determinants of Health     Financial Resource Strain: Not on file   Food Insecurity: Not on file   Transportation Needs: Not on file   Physical Activity: Not on file   Stress: Not on file   Social Connections: Unknown (3/13/2021)    Received [facility], [facility]    Social Connections     Conversations with friends/family/neighbors per week: Not on file   Housing Stability: Not on file     Surgical History:    Past Surgical History:   Procedure Laterality Date    Colposcopy, cervix w/upper adjacent vagina;  w/biopsy(s), cervix      Exam of vagina,colposcopy      positive HPV          Removal of fallopian tube Bilateral 2013    ovaries appeared normal    Tubal ligation       Family History:    Family History   Problem Relation Age of Onset    Other (Other) Father         liver cirrhosis    Hypertension Mother     Glaucoma Mother     Heart Disorder Mother     Other (Other) Mother     Glaucoma Sister     Other (Other) Sister         Colon polyps    Heart Disorder Sister         stent placement    Diabetes Neg     Macular degeneration Neg     Cancer Neg     Bleeding Disorders Neg     Clotting Disorder Neg        Food Journal  Reviewed and Discussed:       Patient has a Food Journal?: yes   Patient is reading nutrition labels?  yes  Average Caloric Intake:     Average CHO Intake: 100  Is patient exercising? yes  Type of exercise? Walking  Weights at home    Eating Habits  Patient states the following:  Eats 3 meal(s) per day  Length of time it takes to consume a meal:  20  # of snacks per day: 1 Type of snacks:  Snap peas,  fruit  Amount of soda consumption per day:    Amount of water (in ounces) per day:  64  Drinking between meals only:  yes  Toughest challenge:    Nutritional Goals  Limit carbohydrates to 100 gms per day, Eat 100-200 calories within 1 hour of waking  and Eat 3-4 cups of fresh fruits or vegetables daily    Behavior Modifications Reviewed and Discussed  Eat breakfast, Eat 3 meals per day, Plan meals in advance, Read nutrition labels, Drink 64 oz of water per day, Maintain a daily food journal, No drinking 30 minutes before or after meals, Utlize portion control strategies to reduce calorie intake, Identify triggers for eating and manage cues and Eat slowly and take 20 to 30 minutes to complete each meal    Exercise Goals Reviewed and Discussed        ROS:    Constitutional: negative  Respiratory: negative  Cardiovascular: negative  Gastrointestinal: negative  Musculoskeletal:positive for  arthralgias and back pain  Neurological: negative  Behavioral/Psych: negative  Endocrine: negative  All other systems were reviewed and are negative    Physical Exam:   General appearance: alert, appears stated age, cooperative and mildly obese  Head: Normocephalic, without obvious abnormality, atraumatic  Back: symmetric, no curvature. ROM normal. No CVA tenderness.  Lungs: clear to auscultation bilaterally  Heart: S1, S2 normal, no murmur, click, rub or gallop, regular rate and rhythm  Abdomen: soft, non-tender; bowel sounds normal; no masses,  no organomegaly  Extremities: extremities normal, atraumatic, no cyanosis or edema  Pulses: 2+ and symmetric  Neurologic: Grossly normal    ASSESSMENT     HYPERTENSION:  The patient's blood pressure has been well controlled.  she has been checking it as instructed and has remained in relatively good control.    Encounter Diagnosis(ses):   Encounter Diagnoses   Name Primary?    HTN (hypertension), benign Yes    Stress     Encounter for therapeutic drug monitoring     Overweight (BMI 25.0-29.9)        PLAN     Patient is not interested in bariatric surgery. Patient desires to pursue traditional weight loss at this time.      HYPERTENSION: Blood pressure stable on the above medications. No interval change in antihypertensive medication.     Goals for next month:  1. Keep a food log.  2. Drink 48-64 ounces of non-caloric beverages per day. No fruit juices or regular soda.  3. Increase activity-upper body exercises, walk 10 minutes per day.  4. Increase fruit and vegetable servings to 5-6 per day.      Tolerating Phentermine  ekg done    Wegovy not covered    Needs to follow up with PCP    Diagnoses and all orders for this visit:    HTN (hypertension), benign    Stress    Encounter for therapeutic drug monitoring    Overweight (BMI 25.0-29.9)          Daniel Mack MD

## 2024-08-12 NOTE — TELEPHONE ENCOUNTER
Dr Hickey,    Patient is requesting for intermittent FMLA due to left hand pain/ shoulder pain. Patient is requesting for start 8/12/24 end 08/12/25. 3-4 flare up per month each episode lasting 7-24 hours. Do you support?    Thanks,    Marybeth

## 2024-08-12 NOTE — TELEPHONE ENCOUNTER
Type of Leave: Intermittent   Reason for Leave: left shoulder pain/ left hand pain   Start date of leave: 08/12/24 - 08/12/25  How much time needed?: 3-4 episodes per month lasting 7-24 hours   Forms Due Date: 8/19/24  Was Fee and Turnaround info Given?: yes

## 2024-08-13 RX ORDER — LEVOCETIRIZINE DIHYDROCHLORIDE 5 MG/1
5 TABLET, FILM COATED ORAL EVERY EVENING
Qty: 90 TABLET | Refills: 3 | OUTPATIENT
Start: 2024-08-13

## 2024-08-13 RX ORDER — LOSARTAN POTASSIUM AND HYDROCHLOROTHIAZIDE 12.5; 5 MG/1; MG/1
0.5 TABLET ORAL DAILY
Qty: 45 TABLET | Refills: 3 | Status: SHIPPED | OUTPATIENT
Start: 2024-08-13

## 2024-08-13 NOTE — TELEPHONE ENCOUNTER
Disp Refills Start End    levocetirizine 5 MG Oral Tab 90 tablet 3 3/25/2024 --    Sig - Route: Take 1 tablet (5 mg total) by mouth every evening. - Oral    Sent to pharmacy as: Levocetirizine Dihydrochloride 5 MG Oral Tablet (Xyzal)    E-Prescribing Status: Receipt confirmed by pharmacy (3/25/2024  4:36 PM CDT)      Associated Diagnoses    Allergic rhinitis, unspecified seasonality, unspecified trigger      Post-nasal drip        Pharm[facility]    [facility] - [address] AT PORTAL TO REGISTERED [facility] SITES, [phone], [phone]

## 2024-08-13 NOTE — TELEPHONE ENCOUNTER
Refill Per Protocol     Requested Prescriptions   Pending Prescriptions Disp Refills    losartan-hydroCHLOROthiazide 50-12.5 MG Oral Tab 45 tablet 0     Sig: Take 0.5 tablets by mouth daily. Physical due nov 2023       Hypertension Medications Protocol Passed - 8/8/2024 10:20 PM        Passed - CMP or BMP in past 12 months        Passed - Last BP reading less than 140/90     BP Readings from Last 1 Encounters:   08/09/24 128/70               Passed - In person appointment or virtual visit in the past 12 mos or appointment in next 3 mos     Recent Outpatient Visits              [facility]    Office Visit    5 days ago Routine physical examination    [facility] Brendan Hickey DO    Office Visit    5 months ago Encounter for screening mammogram for breast cancer    [facility] Brendan Hickey DO    Office Visit    6 months ago [facility]hypert[facility]    Office Visit    10 months ago [facility]    [facility] Visit          Future Appointments         Provider Department Appt Notes    In 4 weeks LMLIZA RN RADIOLOGY 1; [facility] [facility] [place] $75    In 1 month Biester, Neha PARNELL MD [facility] - OB/GYN routine physical examination    In 3 months Daniel Mack MD [facility] Following up from my last appointment canceled due to the  was out of the office    In [facility] months [facility]                     Passed - EGFRCR or GFRAA > 50     GFR Evaluation  EGFRCR: 65 , resulted on 8/8/2024                 Future Appointments          Provider Department Appt Notes    In 4 weeks L[facility] RN RADIOLOGY 1; [facility] CT RM[facility] - [place] $75    In 1 month Biester, Neha PARNELL MD [facility] [place] - OB/GYN routine physical examination    In 3 months Daniel Mack MD [facility] Following up from my last appointment canceled due to the Dr. was out of the office    In 5 months Daniel Mack MD [facility]           Recent Outpatient Visits              4 days ago HTN (hypertension), [facility] Daniel Mack MD    Office Visit    5 days ago Routine physical examination    [facility] [place] Brendan Hickey, DO    Office Visit    5 months ago Encounter for screening mammogram for breast cancer    [facility] Brendan Hickey, DO    Office Visit    6 months [facility]    [facility]    10 months [facility]    Office Visit

## 2024-08-15 NOTE — TELEPHONE ENCOUNTER
Please try to avoid signing forms in the corner as it is not visible when printing and forms are not accepted this way. Thank you!     Dr. Lorenzana      *The ACKNOWLEDGE button has been moved to the top right ribbon*    Please sign off on form if you agree to: FMLA - As discussed  (place your signature on the first page only)    -From your Inbasket, Highlight the patient and click Chart   -Double click the 08/08/24 Forms Completion telephone encounter  -Scroll down to the Media section   -Click the blue Hyperlink: FESTUS Lorenzana 08/15/24  -Click Acknowledge located in the top right ribbon/menu   -Drag the mouse into the blank space of the document and a + sign will appear. Left click to   electronically sign the document.     Thank you,     Marybeth

## 2024-08-17 NOTE — PROGRESS NOTES
Subjective:     Patient ID: Abigail Andrade is a 63 year old female.    This patient is a 63-year-old hypothyroid -American female here for complete preventive care physical and for status update on any confirmed chronic medical illnesses and follow up on any previous labs or procedures that were suggestive or in need of further work up. Colonoscopy is current. Bowel and bladder functions are intact.    Patient currently denies fatigue, chest pain, dizziness, shortness of breath, palpitations, acute visual changes, and/or exertional fatigue.    Patient will soon be due for her Pap smear.  We will refer.    Patient is a  and has a variety of responsibilities on a busy schedule.  She does have chronic and reoccurring problem/pain with her right upper extremity and there are times when she cannot work and will need [facility] support and cover for the days that she cannot effectively do her job-I do support.    Patient has a strong family history for coronary artery disease and the patient would like to be screened and the best way that provides the best information regarding her coronary artery status.  I have suggested that she get CT calcium score.  Patient wants the test ordered.          History/Other:   Review of Systems  Current Outpatient Medications   Medication Sig Dispense Refill    levocetirizine 5 MG Oral Tab Take 1 tablet (5 mg total) by mouth every evening. 90 tablet 3    latanoprost 0.005 % Ophthalmic Solution       dorzolamide 2 % Ophthalmic Solution Apply 1 drop to eye As Directed.      DICLOFENAC 1 % External Gel APPLY 2 GRAMS TOPICALLY 3 TIMES A DAY AS NEEDED 200 g 0    SYNTHROID 75 MCG Oral Tab       losartan-hydroCHLOROthiazide 50-12.5 MG Oral Tab Take 0.5 tablets by mouth daily. Physical due nov 2023 45 tablet 3    [START ON 8/29/2024] Phentermine HCl 37.5 MG Oral Tab Take 1 tablet (37.5 mg total) by mouth daily with breakfast. 30 tablet 5    valACYclovir 1 G Oral Tab Take 1  tablet (1,000 mg total) by mouth every 12 (twelve) hours as needed. (Patient not taking: Reported on 2024) 90 tablet 1     Allergies:  Allergies   Allergen Reactions    Dust Mite Extract OTHER (SEE COMMENTS)       Past Medical History:    Essential hypertension    Fibroids    Genital herpes simplex    Glaucoma    17 1st visit- was on Cosopt bid OU and Latanoprost qhs OU- 17 normal OCT and VF, so D/C Cosopt, but continue Latanoprost qhs OU, 17- stable IOP, so D/C Latanoprost qhs OU, 18 RESUME LATANOPROST QHS OU DUE TO IOP OF 22/26 AND PT CONCERN    Herpes simplex    vaginal    Human papilloma virus infection    Hypothyroidism    Neutropenia (HCC)    saw hematologist 2 years ago, told was nl, from family     Obesity (BMI 30-39.9)    Vaginal dryness, menopausal      Past Surgical History:   Procedure Laterality Date    Colposcopy, cervix w/upper adjacent vagina; w/biopsy(s), cervix      Exam of vagina,colposcopy      positive HPV          Removal of fallopian tube Bilateral 2013    ovaries appeared normal    Tubal ligation        Family History   Problem Relation Age of Onset    Other (Other) Father         liver cirrhosis    Hypertension Mother     Glaucoma Mother     Heart Disorder Mother     Other (Other) Mother     Glaucoma Sister     Other (Other) Sister         Colon polyps    Heart Disorder Sister         stent placement    Diabetes Neg     Macular degeneration Neg     Cancer Neg     Bleeding Disorders Neg     Clotting Disorder Neg       Social History:   Social History     Socioeconomic History    Marital status: Single   Tobacco Use    Smoking status: Never    Smokeless tobacco: Never   Vaping Use    Vaping status: Never Used   Substance and Sexual Activity    Alcohol use: No     Alcohol/week: 0.0 standard drinks of alcohol    Drug use: No    Sexual activity: Not Currently   Other Topics Concern    Caffeine Concern Yes     Comment: black tea, 1 cup and expresso beans     Exercise Yes     Comment: exercises daily   Social History Narrative    Lives alone     No abuse     Social Determinants of Health      [facility]    Social Connections        Objective:   Vitals:    08/08/24 0905   BP: 127/74   Pulse: 81   Resp: 18   Temp: 97.9 °F (36.6 °C)       Physical Exam  Constitutional:       Appearance: Normal appearance.   HENT:      Head: Normocephalic and atraumatic.      Right Ear: Tympanic membrane normal.      Left Ear: Tympanic membrane normal.      Nose: Nose normal.      Mouth/Throat:      Mouth: Mucous membranes are moist.   Neck:      Thyroid: No thyromegaly.   Cardiovascular:      Rate and Rhythm: Normal rate and regular rhythm.      Heart sounds:      No gallop.   Pulmonary:      Effort: Pulmonary effort is normal.      Breath sounds: Normal breath sounds.   Abdominal:      General: Bowel sounds are normal.      Palpations: Abdomen is soft. There is no mass.   Musculoskeletal:      Right shoulder: Tenderness present. Decreased range of motion.        Arms:       Comments: Region of discomfort as depicted.     Neurological:      Mental Status: She is alert.   Psychiatric:         Mood and Affect: Mood normal.         Assessment & Plan:   1. Routine physical examination  The following labs have been ordered.  Patient referred for Pap.  - CBC W Differential W Platelet [E]; Future  - Comp Metabolic Panel (14) [E]; Future  - Lipid Panel [E]; Future  - TSH [E]; Future  - Urinalysis, Routine [E]; Future  - OBG Referral - [place] ([facility])    2. Right arm pain  Patient's pain is chronic in etiology is unsure.  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    3. Family history of early CAD  Ordered.  - CT CALCIUM SCORING; Future      Orders Placed This Encounter   Procedures    CBC W Differential W Platelet [E]    Comp Metabolic Panel (14) [E]    Lipid Panel [E]    TSH [E]     Urinalysis, Routine [E]       Meds This Visit:  Requested Prescriptions      No prescriptions requested or ordered in this encounter       Imaging & Referrals:  OBG - INTERNAL  CT CALCIUM SCORING     Patient Instructions   All adult screening ordered and done appropriate for patient's age and gender and risk factors and complaints.  Encouraged physical fitness and daily physical activity daily.  Monitor blood pressures and record at home. Limit salt intake.  FMLA to be completed regarding the patient's chronic right upper extremity pain.  Patient to be [place] intermittent leave 4 days and 30-day.  To be utilized consecutively or intermittently as deemed necessary.  Form completed for compression stockings.  Patient is at occupational risk for development blood clots.    Return in about 1 year (around 8/8/2025), or if symptoms worsen or fail to improve.  Will

## 2024-08-19 NOTE — TELEPHONE ENCOUNTER
Family Medical Leave Act forms completed ans signed by provider. Faxed to   [facility] Attn: Judy Wang [phone] confirmation received.

## 2024-08-20 NOTE — TELEPHONE ENCOUNTER
Patient called stating employer is requesting a revision on form due to only one office visit was listed. Forms will be revised and faxed to to employer once completed. Patient would like copy uploaded to [employer]

## 2024-08-21 NOTE — TELEPHONE ENCOUNTER
Revision completed. Scanned into chart. eFaxed to Judy Wang/[employer] [place] [id number] [phone]. E fax completed. Sent [employer] message.

## 2024-08-25 DIAGNOSIS — D70.9 NEUTROPENIA, UNSPECIFIED TYPE (HCC): Primary | ICD-10-CM

## 2024-09-03 DIAGNOSIS — E66.3 OVERWEIGHT (BMI 25.0-29.9): ICD-10-CM

## 2024-09-03 RX ORDER — PHENTERMINE HYDROCHLORIDE 37.5 MG/1
37.5 TABLET ORAL
Qty: 30 TABLET | Refills: 5 | Status: SHIPPED | OUTPATIENT
Start: 2024-09-03

## 2024-09-04 ENCOUNTER — OFFICE VISIT (OUTPATIENT)
Dept: FAMILY MEDICINE CLINIC | Facility: CLINIC | Age: 64
End: 2024-09-04

## 2024-09-04 VITALS
DIASTOLIC BLOOD PRESSURE: 80 MMHG | WEIGHT: 170 LBS | OXYGEN SATURATION: 98 % | HEART RATE: 73 BPM | SYSTOLIC BLOOD PRESSURE: 118 MMHG | RESPIRATION RATE: 18 BRPM | BODY MASS INDEX: 25.76 KG/M2 | HEIGHT: 68 IN

## 2024-09-04 DIAGNOSIS — M79.602 LEFT ARM PAIN: Primary | ICD-10-CM

## 2024-09-04 DIAGNOSIS — M54.9 ACUTE LEFT-SIDED BACK PAIN, UNSPECIFIED BACK LOCATION: ICD-10-CM

## 2024-09-04 DIAGNOSIS — J30.9 ALLERGIC RHINITIS, UNSPECIFIED SEASONALITY, UNSPECIFIED TRIGGER: ICD-10-CM

## 2024-09-04 DIAGNOSIS — I10 HTN (HYPERTENSION), BENIGN: ICD-10-CM

## 2024-09-04 PROCEDURE — 99214 OFFICE O/P EST MOD 30 MIN: CPT | Performed by: FAMILY MEDICINE

## 2024-09-04 RX ORDER — ACETAMINOPHEN 500 MG
500 TABLET ORAL EVERY 6 HOURS PRN
COMMUNITY

## 2024-09-04 RX ORDER — LEVOCETIRIZINE DIHYDROCHLORIDE 5 MG/1
5 TABLET, FILM COATED ORAL EVERY EVENING
Qty: 90 TABLET | Refills: 1 | Status: SHIPPED | OUTPATIENT
Start: 2024-09-04

## 2024-09-04 NOTE — PROGRESS NOTES
HPI:    Abigail Andrade is a 63 year old female presents to clinic with a 1 week history of left sided upper extremity/back pain, Right hand dominant. She works as a . Pain is intermittent, sharp, some tingling. No rashes noted - has been vaccinated against shingles.       HISTORY:  Past Medical History:    Essential hypertension    Fibroids    Genital herpes simplex    Glaucoma    17 1st visit- was on Cosopt bid OU and Latanoprost qhs OU- 17 normal OCT and VF, so D/C Cosopt, but continue Latanoprost qhs OU, 17- stable IOP, so D/C Latanoprost qhs OU, 18 RESUME LATANOPROST QHS OU DUE TO IOP OF 22/26 AND PT CONCERN    Herpes simplex    vaginal    Human papilloma virus infection    Hypothyroidism    Neutropenia (HCC)    saw hematologist 2 years ago, told was nl, from family     Obesity (BMI 30-39.9)    Vaginal dryness, menopausal      Past Surgical History:   Procedure Laterality Date    Colposcopy, cervix w/upper adjacent vagina; w/biopsy(s), cervix      Exam of vagina,colposcopy      positive HPV          Removal of fallopian tube Bilateral 2013    ovaries appeared normal    Tubal ligation        Family History   Problem Relation Age of Onset    Other (Other) Father         liver cirrhosis    Hypertension Mother     Glaucoma Mother     Heart Disorder Mother     Other (Other) Mother     Glaucoma Sister     Other (Other) Sister         Colon polyps    Heart Disorder Sister         stent placement    Diabetes Neg     Macular degeneration Neg     Cancer Neg     Bleeding Disorders Neg     Clotting Disorder Neg       Social History:   Social History     Socioeconomic History    Marital status: Single   Tobacco Use    Smoking status: Never    Smokeless tobacco: Never   Vaping Use    Vaping status: Never Used   Substance and Sexual Activity    Alcohol use: No     Alcohol/week: 0.0 standard drinks of alcohol    Drug use: No    Sexual activity: Not Currently   Other Topics  Concern    Caffeine Concern Yes     Comment: black tea, 1 cup and expresso beans    Exercise Yes     Comment: exercises daily   Social History Narrative    Lives alone     No abuse     Social Determinants of Health      Received [facility], [facility]    Social Connections        Medications (Active prior to today's visit):  Current Outpatient Medications   Medication Sig Dispense Refill    acetaminophen 500 MG Oral Tab Take 1 tablet (500 mg total) by mouth every 6 (six) hours as needed for Pain.      levocetirizine 5 MG Oral Tab Take 1 tablet (5 mg total) by mouth every evening. 90 tablet 1    Phentermine HCl 37.5 MG Oral Tab Take 1 tablet (37.5 mg total) by mouth daily with breakfast. 30 tablet 5    losartan-hydroCHLOROthiazide 50-12.5 MG Oral Tab Take 0.5 tablets by mouth daily. Physical due nov 2023 45 tablet 3    latanoprost 0.005 % Ophthalmic Solution       dorzolamide 2 % Ophthalmic Solution Apply 1 drop to eye As Directed.      DICLOFENAC 1 % External Gel APPLY 2 GRAMS TOPICALLY 3 TIMES A DAY AS NEEDED 200 g 0    SYNTHROID 75 MCG Oral Tab       valACYclovir 1 G Oral Tab Take 1 tablet (1,000 mg total) by mouth every 12 (twelve) hours as needed. (Patient not taking: Reported on 9/4/2024) 90 tablet 1       Allergies:  Allergies   Allergen Reactions    Dust Mite Extract OTHER (SEE COMMENTS)         Depression Screening (PHQ-2/PHQ-9): Over the LAST 2 WEEKS                         ROS:   Review of Systems   All other systems reviewed and are negative.      PHYSICAL EXAM:     Vitals:    09/04/24 0947 09/04/24 1214   BP: 131/83 118/80   BP Location: Right arm    Patient Position: Sitting    Cuff Size: adult    Pulse: 73    Resp: 18    SpO2: 98%    Weight: 170 lb (77.1 kg)    Height: 5' 8\" (1.727 m)      Physical Exam  Vitals reviewed.   Constitutional:       General: She is not in acute distress.  Cardiovascular:      Rate and Rhythm: Normal rate.   Pulmonary:       Effort: Pulmonary effort is normal. No respiratory distress.   Neurological:      Mental Status: She is alert.         ASSESSMENT/PLAN:   (M79.602) Left arm pain  (primary encounter diagnosis)  (M54.9) Acute left-sided back pain, unspecified back location  Plan: Physical Therapy Referral - [place] [facility]  - some muscular tightness noted of left side. Supportive care measures discussed. PT referral placed. Follow up as needed     (I10) HTN (hypertension), benign  Plan:   -Blood pressure at goal, to continue current management.  Continued lifestyle modifications encouraged.  Follow-up in 6 months or sooner if needed.      (J30.9) Allergic rhinitis, unspecified seasonality, unspecified trigger  Plan: levocetirizine 5 MG Oral Tab  - she has been experiencing some symptoms of post nasal drip. Levocetirizine refilled. Follow up if symptoms don't improve.                 Responsible party/patient verbalized understanding of information discussed. No barriers to learning observed.            Orders This Visit:  No orders of the defined types were placed in this encounter.      Meds This Visit:  Requested Prescriptions     Signed Prescriptions Disp Refills    levocetirizine 5 MG Oral Tab 90 tablet 1     Sig: Take 1 tablet (5 mg total) by mouth every evening.       Imaging & Referrals:  PHYSICAL THERAPY - INTERNAL     Chaperone offered at visit today.     The 21st Century cures Act makes medical notes like these available to patients in the interest of transparency.  However, be advised that this is a medical document.  It is intended as peer to peer communication.  It is written in medical language and may contain abbreviations or verbiage that are unfamiliar.  It may appear blunt or direct.  Medical documents are intended to carry relevant information, facts as evident, and the clinical opinion of the practitioner.      This note was created by [employer] voice recognition. Errors in content may be related to improper  recognition by the system; efforts to review and correct have been done but errors may still exist. Please contact me with any questions.       9/4/2024  Darvin Jeff MD

## 2024-09-10 ENCOUNTER — HOSPITAL ENCOUNTER (OUTPATIENT)
Dept: CT IMAGING | Age: 64
Discharge: HOME | End: 2024-09-10
Attending: FAMILY MEDICINE

## 2024-09-10 DIAGNOSIS — Z82.49 FAMILY HISTORY OF EARLY CAD: ICD-10-CM

## 2024-09-10 NOTE — PROGRESS NOTES
Date of Service 9/10/2024    JENNIFER VALENZUELA  Date of Birth 11/28/1960    Patient Age: 63 year old    PCP: Brendan Hickey DO  [address]  [address]  [facility] [address]    Heart Scan Consult  Preliminary Heart Scan Score: 83.5    Previous Screening  Heart Scan Completed Previously: No        Peripheral Vascular Scan Completed Previously: No          Risk Factors  Personal Risk Factors  Non-alterable Risk Factors: Family History (2 of her sisters both had stents recently - they are 67 yo and 72 yo.)  Alterable Risk Factors: High Blood Pressure      Body Mass Index  BMI 25    Blood Pressure  /80 on medication.  (Normal =< 120/80,  Elevated = 120-129/ >80,  High Stage1 130-139/80-89 , Stage2 >140/>90)    Lipid Profile  Cholesterol: 141, done on 8/8/2024.  HDL Cholesterol: 66, done on 8/8/2024.  LDL Cholesterol: 61, done on 8/8/2024.  TriGlycerides 67, done on 8/8/2024.  She is not on medication.    Cholesterol Goals  Value   Total  =< 200   HDL  = > 45 Men = > 55 Women   LDL   =< 100   Triglycerides  =< 150       Glucose and Hemoglobin A1C  Lab Results   Component Value Date    GLU 90 08/08/2024    A1C 5.5 10/28/2017     (Normal Fasting Glucose < 100mg/dl )    Nurse Review  Risk factor information and results reviewed with Nurse: Yes    Recommended Follow Up:  Consult your physician regarding:: Final Heart Scan Report;Discuss potential for Incidental Finding      Recommendations for Change:  Nutrition Changes: Low Saturated Fat    Cholesterol Modification (goal of therapy depends upon your risk): No Change Needed    Exercise: Enhance Current Program    Smoking Cessation: No Change Needed         Stress Management: Adopt Stress Management Techniques    Repeat Heart Scan: 3 Years if Calcium Score is > 0.0;Discuss with your Physician              [place] Recommended Resources:  Recommended Resources: PV Screening;Upcoming Classes, Medical Services and Health Library [email or web address]  Recommended PV  Screening: Abdomen;Carotids         Mary GOOD, RN        Please Contact the Nurse Heart Line with any Questions or Concerns [phone].

## 2024-09-17 RX ORDER — DORZOLAMIDE HCL 20 MG/ML
1 SOLUTION/ DROPS OPHTHALMIC AS DIRECTED
Refills: 0 | OUTPATIENT
Start: 2024-09-17

## 2024-10-01 ENCOUNTER — OFFICE VISIT (OUTPATIENT)
Dept: OBGYN CLINIC | Facility: CLINIC | Age: 64
End: 2024-10-01
Payer: COMMERCIAL

## 2024-10-01 ENCOUNTER — HOSPITAL ENCOUNTER (OUTPATIENT)
Dept: MAMMOGRAPHY | Facility: HOSPITAL | Age: 64
Discharge: HOME | End: 2024-10-01
Attending: FAMILY MEDICINE
Payer: COMMERCIAL

## 2024-10-01 VITALS
DIASTOLIC BLOOD PRESSURE: 72 MMHG | BODY MASS INDEX: 25.33 KG/M2 | SYSTOLIC BLOOD PRESSURE: 116 MMHG | WEIGHT: 167.13 LBS | HEIGHT: 68 IN

## 2024-10-01 DIAGNOSIS — Z01.419 WOMEN'S ANNUAL ROUTINE GYNECOLOGICAL EXAMINATION: ICD-10-CM

## 2024-10-01 DIAGNOSIS — R92.8 ABNORMAL MAMMOGRAM OF BOTH BREASTS: Primary | ICD-10-CM

## 2024-10-01 DIAGNOSIS — R92.8 ABNORMAL MAMMOGRAM OF BOTH BREASTS: ICD-10-CM

## 2024-10-01 DIAGNOSIS — Z12.4 ROUTINE CERVICAL SMEAR: Primary | ICD-10-CM

## 2024-10-01 PROCEDURE — 87624 HPV HI-RISK TYP POOLED RSLT: CPT | Performed by: OBSTETRICS & GYNECOLOGY

## 2024-10-01 PROCEDURE — 88175 CYTOPATH C/V AUTO FLUID REDO: CPT | Performed by: OBSTETRICS & GYNECOLOGY

## 2024-10-01 PROCEDURE — 77062 BREAST TOMOSYNTHESIS BI: CPT | Performed by: FAMILY MEDICINE

## 2024-10-01 PROCEDURE — 99396 PREV VISIT EST AGE 40-64: CPT | Performed by: OBSTETRICS & GYNECOLOGY

## 2024-10-01 PROCEDURE — 77066 DX MAMMO INCL CAD BI: CPT | Performed by: FAMILY MEDICINE

## 2024-10-01 PROCEDURE — 99459 PELVIC EXAMINATION: CPT | Performed by: OBSTETRICS & GYNECOLOGY

## 2024-10-01 RX ORDER — VALACYCLOVIR HYDROCHLORIDE 1 G/1
1000 TABLET, FILM COATED ORAL EVERY 12 HOURS PRN
Qty: 90 TABLET | Refills: 3 | Status: SHIPPED | OUTPATIENT
Start: 2024-10-01

## 2024-10-01 NOTE — PROGRESS NOTES
GYN ANNUAL    10/1/2024  11:11 AM    Chief Complaint   Patient presents with    Annual     Annual exam/pap   .    HPI: Patient is a 63 year old  LMP 2013 presents for annual gyn exam and PAP. Reports no gynecologic issues or complaints. No pelvic pain. No abnormal vaginal discharge or bleeding.     OB History    Para Term  AB Living   1 1 1     1   SAB IAB Ectopic Multiple Live Births           1      # Outcome Date GA Lbr Zay/2nd Weight Sex Type Anes PTL Lv   1 Term 1977    M NORMAL SPONT   SURI         GYN hx:    Hx Prior Abnormal Pap: Yes  Pap Date: 21  Pap Result Notes: wnl  Follow Up Recommendation: Mammo: 2024=abnormal  DX mammo and usn done 2024, needs 6 month follow up, appointment schedueled 10/01/24  CONTRACEPTION: N/A  LAST MAMMOGRAM: 2024 - repeat exams today      Current Outpatient Medications   Medication Sig Dispense Refill    levocetirizine 5 MG Oral Tab Take 1 tablet (5 mg total) by mouth every evening. 90 tablet 1    Phentermine HCl 37.5 MG Oral Tab Take 1 tablet (37.5 mg total) by mouth daily with breakfast. 30 tablet 5    losartan-hydroCHLOROthiazide 50-12.5 MG Oral Tab Take 0.5 tablets by mouth daily. Physical due 2023 45 tablet 3    latanoprost 0.005 % Ophthalmic Solution       dorzolamide 2 % Ophthalmic Solution Apply 1 drop to eye As Directed.      DICLOFENAC 1 % External Gel APPLY 2 GRAMS TOPICALLY 3 TIMES A DAY AS NEEDED 200 g 0    SYNTHROID 75 MCG Oral Tab       valACYclovir 1 G Oral Tab Take 1 tablet (1,000 mg total) by mouth every 12 (twelve) hours as needed. (Patient not taking: Reported on 2024) 90 tablet 1       Past Medical History:    Essential hypertension    Fibroids    Genital herpes simplex    Genital warts    Glaucoma    17 1st visit- was on Cosopt bid OU and Latanoprost qhs OU- 17 normal OCT and VF, so D/C Cosopt, but continue Latanoprost qhs OU, 17- stable IOP, so D/C Latanoprost qhs OU, 18 RESUME LATANOPROST  QHS OU DUE TO IOP OF  AND PT CONCERN    Herpes simplex    vaginal    Human papilloma virus infection    Hypothyroidism    Neutropenia (HCC)    saw hematologist 2 years ago, told was nl, from family     Obesity (BMI 30-39.9)    Sexually transmitted disease    Vaginal dryness, menopausal     Past Surgical History:   Procedure Laterality Date    Colposcopy, cervix w/upper adjacent vagina; w/biopsy(s), cervix      Exam of vagina,colposcopy      positive HPV    Insert intrauterine device                Other surgical history  ONLY -removed Fallopian Tube Removal        Removal of fallopian tube Bilateral 2013    ovaries appeared normal    Remove intrauterine device          Tubal ligation       Allergies   Allergen Reactions    Dust Mite Extract OTHER (SEE COMMENTS)     Family History   Problem Relation Age of Onset    Other (Other) Father         liver cirrhosis    Hypertension Mother     Glaucoma Mother     Heart Disorder Mother         AFIB  Pace Maker    Other (Other) Mother     Glaucoma Sister     Other (Other) Sister         Colon polyps    Heart Disorder Sister         stent placement    Diabetes Neg     Macular degeneration Neg     Cancer Neg     Bleeding Disorders Neg     Clotting Disorder Neg      Social History     Socioeconomic History    Marital status: Single   Tobacco Use    Smoking status: Never    Smokeless tobacco: Never    Tobacco comments:     na   Vaping Use    Vaping status: Never Used   Substance and Sexual Activity    Alcohol use: No    Drug use: No    Sexual activity: Not Currently     Social History     Social History Narrative    Lives alone     No abuse       ROS:     Review of Systems:  A comprehensive 10 point ROS was completed. All pertinent positives and negatives noted in the HPI        /72   Ht 68\"   Wt 167 lb 1.6 oz (75.8 kg)   LMP 10/01/2013 (Within Weeks)   BMI 25.41 kg/m²     Exam:   GENERAL: well developed, well nourished, in no apparent  distress  SKIN: no rashes, no lesions  HEENT: normal  LUNGS: respiration unlabored  CARDIOVASCULAR: no peripheral edema or varicosities, skin warm and dry  BREASTS: bilaterally nontender, no palpable masses, no nipple discharge, no skin changes, no axillary adenopathy  ABDOMEN: Soft, non distended; non tender, no masses  GYNE/:   External Genitalia: normal, no lesions, good perineal support  Urethra: meatus normal  Bladder: well supported  Vagina: normal mucosa, no lesions, no discharge   Uterus: normal size, mobile, nontender  Cervix:  normal os, no lesions or bleeding  Adnexa: normal size, bilaterally nontender, no palpable masses  Cul-de-sac: normal  R/V: normal perineum, no hemorrhoids  EXTREMITIES: nontender without edema      A/P: Patient is 63 year old female here for well-woman exam.     1. Women's annual routine gynecological examination  - Normal findings  - PAP today        10/1/2024  Neha Piedra MD

## 2024-10-07 ENCOUNTER — TELEPHONE (OUTPATIENT)
Dept: PHYSICAL THERAPY | Facility: HOSPITAL | Age: 64
End: 2024-10-07

## 2024-10-08 ENCOUNTER — APPOINTMENT (OUTPATIENT)
Dept: PHYSICAL THERAPY | Facility: HOSPITAL | Age: 64
End: 2024-10-08
Attending: FAMILY MEDICINE
Payer: COMMERCIAL

## 2024-10-10 ENCOUNTER — APPOINTMENT (OUTPATIENT)
Dept: PHYSICAL THERAPY | Facility: HOSPITAL | Age: 64
End: 2024-10-10
Attending: FAMILY MEDICINE
Payer: COMMERCIAL

## 2024-10-10 ENCOUNTER — TELEPHONE (OUTPATIENT)
Dept: PHYSICAL THERAPY | Facility: HOSPITAL | Age: 64
End: 2024-10-10

## 2024-10-15 ENCOUNTER — APPOINTMENT (OUTPATIENT)
Dept: PHYSICAL THERAPY | Facility: HOSPITAL | Age: 64
End: 2024-10-15
Attending: FAMILY MEDICINE
Payer: COMMERCIAL

## 2024-10-17 ENCOUNTER — APPOINTMENT (OUTPATIENT)
Dept: PHYSICAL THERAPY | Facility: HOSPITAL | Age: 64
End: 2024-10-17
Attending: FAMILY MEDICINE
Payer: COMMERCIAL

## 2024-10-21 ENCOUNTER — APPOINTMENT (OUTPATIENT)
Dept: PHYSICAL THERAPY | Facility: HOSPITAL | Age: 64
End: 2024-10-21
Attending: FAMILY MEDICINE
Payer: COMMERCIAL

## 2024-11-14 ENCOUNTER — TELEPHONE (OUTPATIENT)
Dept: FAMILY MEDICINE CLINIC | Facility: CLINIC | Age: 64
End: 2024-11-14

## 2024-11-14 NOTE — TELEPHONE ENCOUNTER
Patient calling with continued hand, thumb and wrist pain. She feels this is from carrying or pushing trays at work. Patient did see provider back in Sept and mentioned it was related to her back and given PT order but she did not go and is not sure that will help.  Patient wants imaging. Advised that needs visit to discuss and scheduled for next week Monday per her request due to work schedule.   Patient is also scheduled with Dr. Hickey for 11/21/24 and will likely keep that.     Future Appointments   Date Time Provider Department Center   11/18/2024  1:00 PM Herve Oglesby PA-C [facility] [place]   11/21/2024  9:00 AM Brendan Hickey DO [facility]   12/2/2024  2:30 PM Daniel Mack MD [address]   1/28/2025 12:15 PM Daniel Mack MD [facility]       Patient will plan for visit Monday when back in town.

## 2024-11-20 ENCOUNTER — OFFICE VISIT (OUTPATIENT)
Dept: FAMILY MEDICINE CLINIC | Facility: CLINIC | Age: 64
End: 2024-11-20

## 2024-11-20 VITALS
DIASTOLIC BLOOD PRESSURE: 80 MMHG | SYSTOLIC BLOOD PRESSURE: 131 MMHG | BODY MASS INDEX: 25.31 KG/M2 | WEIGHT: 167 LBS | HEIGHT: 68 IN | HEART RATE: 87 BPM

## 2024-11-20 DIAGNOSIS — M65.4 DE QUERVAIN'S TENOSYNOVITIS, BILATERAL: Primary | ICD-10-CM

## 2024-11-20 PROCEDURE — 99213 OFFICE O/P EST LOW 20 MIN: CPT | Performed by: PHYSICIAN ASSISTANT

## 2024-11-20 NOTE — PROGRESS NOTES
HPI:     HPI  A 63-year-old female is in the office complaining of intermittent hand, thumb and wrist pain for the past few months. She feels this is from carrying or pushing trays at work. The pain does not radiate. She does not take pain relief.  She denies swelling, redness, weakness, numbness or tingling sensation.    Medications:     Current Outpatient Medications   Medication Sig Dispense Refill    valACYclovir 1 G Oral Tab Take 1 tablet (1,000 mg total) by mouth every 12 (twelve) hours as needed. 90 tablet 3    levocetirizine 5 MG Oral Tab Take 1 tablet (5 mg total) by mouth every evening. 90 tablet 1    Phentermine HCl 37.5 MG Oral Tab Take 1 tablet (37.5 mg total) by mouth daily with breakfast. 30 tablet 5    losartan-hydroCHLOROthiazide 50-12.5 MG Oral Tab Take 0.5 tablets by mouth daily. Physical due nov 2023 45 tablet 3    latanoprost 0.005 % Ophthalmic Solution       dorzolamide 2 % Ophthalmic Solution Apply 1 drop to eye As Directed.      DICLOFENAC 1 % External Gel APPLY 2 GRAMS TOPICALLY 3 TIMES A DAY AS NEEDED 200 g 0    SYNTHROID 75 MCG Oral Tab          Allergies:   Allergies[1]    History:     Health Maintenance   Topic Date Due    Annual Physical  08/08/2025    Mammogram  10/01/2025    Colorectal Cancer Screening  01/19/2028    Pap Smear  10/01/2029    DTaP,Tdap,and Td Vaccines (3 - Td or Tdap) 11/01/2032    Influenza Vaccine  Completed    Annual Depression Screening  Completed    Zoster Vaccines  Completed    COVID-19 Vaccine  Completed       Patient's last menstrual period was 10/01/2013 (within weeks).   Past Medical History:     Past Medical History:    Essential hypertension    Fibroids    Genital herpes simplex    Genital warts    Glaucoma    6/20/17 1st visit- was on Cosopt bid OU and Latanoprost qhs OU- 6/17/17 normal OCT and VF, so D/C Cosopt, but continue Latanoprost qhs OU, 9/16/17- stable IOP, so D/C Latanoprost qhs OU, 1/17/18 RESUME LATANOPROST QHS OU DUE TO IOP OF 22/26 AND PT  CONCERN    Herpes simplex    vaginal    Human papilloma virus infection    Hypothyroidism    Neutropenia (HCC)    saw hematologist 2 years ago, told was nl, from family     Obesity (BMI 30-39.9)    Sexually transmitted disease    Vaginal dryness, menopausal       Past Surgical History:     Past Surgical History:   Procedure Laterality Date    Colposcopy, cervix w/upper adjacent vagina; w/biopsy(s), cervix      Exam of vagina,colposcopy      positive HPV    Insert intrauterine device                Other surgical history  ONLY -removed Fallopian Tube Removal        Removal of fallopian tube Bilateral 2013    ovaries appeared normal    Remove intrauterine device          Tubal ligation         Family History:     Family History   Problem Relation Age of Onset    Other (Other) Father         liver cirrhosis    Hypertension Mother     Glaucoma Mother     Heart Disorder Mother         AFIB  Pace Maker    Other (Other) Mother     Glaucoma Sister     Other (Other) Sister         Colon polyps    Heart Disorder Sister         stent placement    Diabetes Neg     Macular degeneration Neg     Cancer Neg     Bleeding Disorders Neg     Clotting Disorder Neg        Social History:     Social History     Socioeconomic History    Marital status: Single     Spouse name: Not on file    Number of children: Not on file    Years of education: Not on file    Highest education level: Not on file   Occupational History    Not on file   Tobacco Use    Smoking status: Never    Smokeless tobacco: Never    Tobacco comments:     na   Vaping Use    Vaping status: Never Used   Substance and Sexual Activity    Alcohol use: No    Drug use: No    Sexual activity: Not Currently   Other Topics Concern    Caffeine Concern Yes    Exercise Yes    Seat Belt No    Special Diet No    Stress Concern No    Weight Concern No   Social History Narrative    Lives alone     No abuse     Social Drivers of Health     Financial Resource  Strain: Not on file   Food Insecurity: Not on file   Transportation Needs: Not on file   Physical Activity: Not on file   Stress: Not on file   Social Connections: Unknown (3/13/2021)    Received [facility], [facility]    Social Connections     Conversations with friends/family/neighbors per week: Not on file   Housing Stability: Not on file       Review of Systems:   Review of Systems   Hand, wrist and thumb pain.    Vitals:    11/20/24 1438   BP: 131/80   Pulse: 87   Weight: 167 lb (75.8 kg)   Height: 5' 8\" (1.727 m)     Body mass index is 25.39 kg/m².    Physical Exam:   Physical Exam  Vitals reviewed.      Bilateral hand, wrist, and thumb: no swelling, no erythema, no tenderness to palpation. Full ROM.    Assessment and Plan::     Problem List Items Addressed This Visit    None  Visit Diagnoses       De Quervain's tenosynovitis, bilateral    -  Primary        Advise patient to take Tylenol or Ibuprofen as needed for pain.  Advise the patient to apply ice compresses BID and wear splint to support.    Discussed plan of care with pt and pt is in agreement.All questions answered. Pt to call with questions or concerns.         [1]   Allergies  Allergen Reactions    Dust Mite Extract OTHER (SEE COMMENTS)

## 2024-11-21 ENCOUNTER — PATIENT MESSAGE (OUTPATIENT)
Dept: FAMILY MEDICINE CLINIC | Facility: CLINIC | Age: 64
End: 2024-11-21

## 2024-11-21 ENCOUNTER — OFFICE VISIT (OUTPATIENT)
Dept: FAMILY MEDICINE CLINIC | Facility: CLINIC | Age: 64
End: 2024-11-21
Payer: COMMERCIAL

## 2024-11-21 VITALS
RESPIRATION RATE: 18 BRPM | HEART RATE: 87 BPM | OXYGEN SATURATION: 98 % | BODY MASS INDEX: 25 KG/M2 | SYSTOLIC BLOOD PRESSURE: 102 MMHG | TEMPERATURE: 98 F | WEIGHT: 167 LBS | DIASTOLIC BLOOD PRESSURE: 76 MMHG

## 2024-11-21 DIAGNOSIS — M99.02 SOMATIC DYSFUNCTION OF THORACOLUMBAR REGION: ICD-10-CM

## 2024-11-21 DIAGNOSIS — M79.642 BILATERAL HAND PAIN: Primary | ICD-10-CM

## 2024-11-21 DIAGNOSIS — M79.641 BILATERAL HAND PAIN: Primary | ICD-10-CM

## 2024-11-21 DIAGNOSIS — M54.50 THORACOLUMBAR BACK PAIN: ICD-10-CM

## 2024-11-21 DIAGNOSIS — M54.6 THORACOLUMBAR BACK PAIN: ICD-10-CM

## 2024-11-21 PROCEDURE — 99214 OFFICE O/P EST MOD 30 MIN: CPT | Performed by: FAMILY MEDICINE

## 2024-11-21 PROCEDURE — 98925 OSTEOPATH MANJ 1-2 REGIONS: CPT | Performed by: FAMILY MEDICINE

## 2024-11-21 NOTE — PROGRESS NOTES
Subjective:     Patient ID: Abigail Andrade is a 63 year old female.    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  For several months she has had discomfort in bilateral hands with the focal discomfort being in the thenar eminence bilaterally.  Please evaluate and treat as deemed necessary and appropriate.  Please help to rule out a form of tenosynovitis, etc.    In addition the patient complains of a thoracic pain on the leftt provoked by activity and movement x 8 days.  There was no acute event and the patient did not report any trauma or a fall related to her left thoracic discomfort.  Patient has described this as a sharp and fleeting discomfort and it is related to position and activity.        History/Other:   Review of Systems  Current Outpatient Medications   Medication Sig Dispense Refill    valACYclovir 1 G Oral Tab Take 1 tablet (1,000 mg total) by mouth every 12 (twelve) hours as needed. 90 tablet 3    Phentermine HCl 37.5 MG Oral Tab Take 1 tablet (37.5 mg total) by mouth daily with breakfast. 30 tablet 5    losartan-hydroCHLOROthiazide 50-12.5 MG Oral Tab Take 0.5 tablets by mouth daily. Physical due nov 2023 45 tablet 3    latanoprost 0.005 % Ophthalmic Solution       dorzolamide 2 % Ophthalmic Solution Apply 1 drop to eye As Directed.      DICLOFENAC 1 % External Gel APPLY 2 GRAMS TOPICALLY 3 TIMES A DAY AS NEEDED 200 g 0    SYNTHROID 75 MCG Oral Tab       levocetirizine 5 MG Oral Tab Take 1 tablet (5 mg total) by mouth every evening. (Patient not taking: Reported on 11/21/2024) 90 tablet 1     Allergies:Allergies[1]    Past Medical History:    Essential hypertension    Fibroids    Genital herpes simplex    Genital warts    Glaucoma    6/20/17 1st  visit- was on Cosopt bid OU and Latanoprost qhs OU- 17 normal OCT and VF, so D/C Cosopt, but continue Latanoprost qhs OU, 17- stable IOP, so D/C Latanoprost qhs OU, 18 RESUME LATANOPROST QHS OU DUE TO IOP OF 22/26 AND PT CONCERN    Herpes simplex    vaginal    Human papilloma virus infection    Hypothyroidism    Neutropenia (HCC)    saw hematologist 2 years ago, told was nl, from family     Obesity (BMI 30-39.9)    Sexually transmitted disease    Vaginal dryness, menopausal      Past Surgical History:   Procedure Laterality Date    Colposcopy, cervix w/upper adjacent vagina; w/biopsy(s), cervix      Exam of vagina,colposcopy      positive HPV    Insert intrauterine device                Other surgical history  ONLY -removed Fallopian Tube Removal        Removal of fallopian tube Bilateral 2013    ovaries appeared normal    Remove intrauterine device          Tubal ligation        Family History   Problem Relation Age of Onset    Other (Other) Father         liver cirrhosis    Hypertension Mother     Glaucoma Mother     Heart Disorder Mother         AFIB  Pace Maker    Other (Other) Mother     Glaucoma Sister     Other (Other) Sister         Colon polyps    Heart Disorder Sister         stent placement    Diabetes Neg     Macular degeneration Neg     Cancer Neg     Bleeding Disorders Neg     Clotting Disorder Neg       Social History:   Social History     Socioeconomic History    Marital status: Single   Tobacco Use    Smoking status: Never    Smokeless tobacco: Never    Tobacco comments:     na   Vaping Use    Vaping status: Never Used   Substance and Sexual Activity    Alcohol use: No    Drug use: No    Sexual activity: Not Currently   Other Topics Concern    Caffeine Concern Yes    Exercise Yes    Seat Belt No    Special Diet No    Stress Concern No    Weight Concern No   Social History Narrative    Lives alone     No abuse     Social Drivers of Health      [facility]  [facility]    Social Connections        Objective:   Vitals:    11/21/24 0928   BP: 102/76   Pulse:    Resp:    Temp:        Physical Exam  Constitutional:       General: She is not in acute distress.     Appearance: She is not ill-appearing.   Cardiovascular:      Rate and Rhythm: Normal rate and regular rhythm.      Heart sounds: Normal heart sounds.   Pulmonary:      Breath sounds: Normal breath sounds.   Musculoskeletal:      Right hand: Tenderness present.      Left hand: Tenderness present.        Back:       Comments: Thenar eminence discomfort bilateral hands-subjective    Region of discomfort in the thoracic area as depicted.   Neurological:      Mental Status: She is alert.         Assessment & Plan:   1. Bilateral hand pain  Referred.  See patient instructions.  - Plastic Surgery Referral - In Network    2. Thoracolumbar back pain  Osteopathic manipulation performed in the thoracolumbar region with minimal release, however stretching did occur.  Patient advised to proceed with plans for chiropractic assessment and care.  - OSTEOPATHIC MANIP,1-2 BODY REGN    3. Somatic dysfunction of thoracolumbar region  Same as #2.  See #2.  - OSTEOPATHIC MANIP,1-2 BODY REGN      No orders of the defined types were placed in this encounter.      Meds This Visit:  Requested Prescriptions      No prescriptions requested or ordered in this encounter       Imaging & Referrals:  None     Patient Instructions   Osteopathic manipulation attempted in the thoracolumbar region.  Patient displays significant spasm in the thoracic region.  Stretches have been placed on the patient's MyChart as well as attached to the after visit summary.  Patient being referred to hand specialist for formal diagnosis of hand complaint.    Return in about 6 weeks (around 1/2/2025), or if symptoms worsen or fail to improve.         [1]   Allergies  Allergen Reactions    Dust Mite Extract OTHER (SEE  COMMENTS)

## 2024-11-21 NOTE — PROCEDURES
Multiple vertebral segments in the thoracolumbar region misaligned. Manual osteopathic manipulative therapy performed and minimal relief obtained. Patient instantaneously improved, but not resolved.

## 2024-11-21 NOTE — PATIENT INSTRUCTIONS
Osteopathic manipulation attempted in the thoracolumbar region.  Patient displays significant spasm in the thoracic region.  Stretches have been placed on the patient's MyChart as well as attached to the after visit summary.  Patient being referred to hand specialist for formal diagnosis of hand complaint.

## 2024-12-05 ENCOUNTER — OFFICE VISIT (OUTPATIENT)
Dept: SURGERY | Facility: CLINIC | Age: 64
End: 2024-12-05
Payer: COMMERCIAL

## 2024-12-05 VITALS
DIASTOLIC BLOOD PRESSURE: 76 MMHG | HEIGHT: 68 IN | BODY MASS INDEX: 25.41 KG/M2 | RESPIRATION RATE: 16 BRPM | SYSTOLIC BLOOD PRESSURE: 114 MMHG | WEIGHT: 167.63 LBS | HEART RATE: 87 BPM

## 2024-12-05 DIAGNOSIS — Z51.81 ENCOUNTER FOR THERAPEUTIC DRUG MONITORING: ICD-10-CM

## 2024-12-05 DIAGNOSIS — I10 HTN (HYPERTENSION), BENIGN: Primary | ICD-10-CM

## 2024-12-05 DIAGNOSIS — F43.9 STRESS: ICD-10-CM

## 2024-12-05 DIAGNOSIS — E66.3 OVERWEIGHT (BMI 25.0-29.9): ICD-10-CM

## 2024-12-05 PROCEDURE — 99214 OFFICE O/P EST MOD 30 MIN: CPT | Performed by: INTERNAL MEDICINE

## 2024-12-05 NOTE — PROGRESS NOTES
[facility], [facility], [place]  [address]  [facility] [address]  Dept: [phone]       Patient:  Abigail Andrade  :      1960  MRN:      [id number]    Chief Complaint:    Chief Complaint   Patient presents with    Follow - Up    Weight Management       SUBJECTIVE     History of Present Illness:  Abigail is being seen today for a follow-up for non surgical weight loss.     Past Medical History:   Past Medical History:    Essential hypertension    Fibroids    Genital herpes simplex    Genital warts    Glaucoma    17 1st visit- was on Cosopt bid OU and Latanoprost qhs OU- 17 normal OCT and VF, so D/C Cosopt, but continue Latanoprost qhs OU, 17- stable IOP, so D/C Latanoprost qhs OU, 18 RESUME LATANOPROST QHS OU DUE TO IOP OF 22/26 AND PT CONCERN    Herpes simplex    vaginal    Human papilloma virus infection    Hypothyroidism    Neutropenia (HCC)    saw hematologist 2 years ago, told was nl, from family     Obesity (BMI 30-39.9)    Sexually transmitted disease    Vaginal dryness, menopausal        Comorbidities:  Back pain-Improvement?  yes, Joint pain-Improvement?  yes, Hypertension-Improvement?  yes and SHUKRI-Improvement?  yes    OBJECTIVE     Vitals: /76   Pulse 87   Resp 16   Ht 5' 8\" (1.727 m)   Wt 167 lb 9.6 oz (76 kg)   LMP 10/01/2013 (Within Weeks)   BMI 25.48 kg/m²     Initial weight loss:-03   Total weight loss: -35   Start weight: 203    Wt Readings from Last 3 Encounters:   24 167 lb 9.6 oz (76 kg)   24 167 lb (75.8 kg)   24 167 lb (75.8 kg)       Patient Medications:    Current Outpatient Medications   Medication Sig Dispense Refill    valACYclovir 1 G Oral Tab Take 1 tablet (1,000 mg total) by mouth every 12 (twelve) hours as needed. 90 tablet 3    levocetirizine 5 MG Oral Tab Take 1 tablet (5 mg total) by mouth every evening. 90 tablet 1    Phentermine HCl 37.5 MG Oral Tab Take 1  tablet (37.5 mg total) by mouth daily with breakfast. 30 tablet 5    losartan-hydroCHLOROthiazide 50-12.5 MG Oral Tab Take 0.5 tablets by mouth daily. Physical due 2023 45 tablet 3    latanoprost 0.005 % Ophthalmic Solution       dorzolamide 2 % Ophthalmic Solution Apply 1 drop to eye As Directed.      DICLOFENAC 1 % External Gel APPLY 2 GRAMS TOPICALLY 3 TIMES A DAY AS NEEDED 200 g 0    SYNTHROID 75 MCG Oral Tab        Allergies:  Dust mite extract     Social History:    Social History     Socioeconomic History    Marital status: Single     Spouse name: Not on file    Number of children: Not on file    Years of education: Not on file    Highest education level: Not on file   Occupational History    Not on file   Tobacco Use    Smoking status: Never    Smokeless tobacco: Never    Tobacco comments:     na   Vaping Use    Vaping status: Never Used   Substance and Sexual Activity    Alcohol use: No    Drug use: No    Sexual activity: Not Currently   Other Topics Concern    Caffeine Concern Yes    Exercise Yes    Seat Belt No    Special Diet No    Stress Concern No    Weight Concern No   Social History Narrative    Lives alone     No abuse     Social Drivers of Health     Financial Resource Strain: Not on file   Food Insecurity: Not on file   Transportation Needs: Not on file   Physical Activity: Not on file   Stress: Not on file   Social Connections: Unknown (3/13/2021)    [facility], [facility]    Social Connections     Conversations with friends/family/neighbors per week: Not on file   Housing Stability: Not on file     Surgical History:    Past Surgical History:   Procedure Laterality Date    Colposcopy, cervix w/upper adjacent vagina; w/biopsy(s), cervix      Exam of vagina,colposcopy      positive HPV    Insert intrauterine device                Other surgical history  ONLY -removed Fallopian Tube Removal        Removal of fallopian tube  Bilateral 12/2013    ovaries appeared normal    Remove intrauterine device      1991    Tubal ligation       Family History:    Family History   Problem Relation Age of Onset    Other (Other) Father         liver cirrhosis    Hypertension Mother     Glaucoma Mother     Heart Disorder Mother         AFIB  Pace Maker    Other (Other) Mother     Glaucoma Sister     Other (Other) Sister         Colon polyps    Heart Disorder Sister         stent placement    Diabetes Neg     Macular degeneration Neg     Cancer Neg     Bleeding Disorders Neg     Clotting Disorder Neg        Food Journal  Reviewed and Discussed:       Patient has a Food Journal?: yes   Patient is reading nutrition labels?  yes  Average Caloric Intake:     Average CHO Intake: 100  Is patient exercising? yes  Type of exercise? Walking  Weights at home    Eating Habits  Patient states the following:  Eats 3 meal(s) per day  Length of time it takes to consume a meal:  20  # of snacks per day: 1 Type of snacks:  Snap peas,  fruit  Amount of soda consumption per day:    Amount of water (in ounces) per day:  64  Drinking between meals only:  yes  Toughest challenge:    Nutritional Goals  Limit carbohydrates to 100 gms per day, Eat 100-200 calories within 1 hour of waking  and Eat 3-4 cups of fresh fruits or vegetables daily    Behavior Modifications Reviewed and Discussed  Eat breakfast, Eat 3 meals per day, Plan meals in advance, Read nutrition labels, Drink 64 oz of water per day, Maintain a daily food journal, No drinking 30 minutes before or after meals, Utlize portion control strategies to reduce calorie intake, Identify triggers for eating and manage cues and Eat slowly and take 20 to 30 minutes to complete each meal    Exercise Goals Reviewed and Discussed        ROS:    Constitutional: negative  Respiratory: negative  Cardiovascular: negative  Gastrointestinal: negative  Musculoskeletal:positive for arthralgias and back pain  Neurological:  negative  Behavioral/Psych: negative  Endocrine: negative  All other systems were reviewed and are negative    Physical Exam:   General appearance: alert, appears stated age, cooperative and mildly obese  Head: Normocephalic, without obvious abnormality, atraumatic  Back: symmetric, no curvature. ROM normal. No CVA tenderness.  Lungs: clear to auscultation bilaterally  Heart: S1, S2 normal, no murmur, click, rub or gallop, regular rate and rhythm  Abdomen: soft, non-tender; bowel sounds normal; no masses,  no organomegaly  Extremities: extremities normal, atraumatic, no cyanosis or edema  Pulses: 2+ and symmetric  Neurologic: Grossly normal    ASSESSMENT     HYPERTENSION:  The patient's blood pressure has been well controlled.  she has been checking it as instructed and has remained in relatively good control.    Encounter Diagnosis(ses):   Encounter Diagnoses   Name Primary?    HTN (hypertension), benign Yes    Stress     Encounter for therapeutic drug monitoring     Overweight (BMI 25.0-29.9)        PLAN     Patient is not interested in bariatric surgery. Patient desires to pursue traditional weight loss at this time.      HYPERTENSION: Blood pressure stable on the above medications. No interval change in antihypertensive medication.     Goals for next month:  1. Keep a food log.  2. Drink 48-64 ounces of non-caloric beverages per day. No fruit juices or regular soda.  3. Increase activity-upper body exercises, walk 10 minutes per day.  4. Increase fruit and vegetable servings to 5-6 per day.      Tolerating Phentermine  ekg done      Needs to follow up with PCP    Diagnoses and all orders for this visit:    HTN (hypertension), benign    Stress    Encounter for therapeutic drug monitoring    Overweight (BMI 25.0-29.9)          Daniel Mack MD

## 2024-12-16 ENCOUNTER — TELEPHONE (OUTPATIENT)
Dept: SURGERY | Facility: CLINIC | Age: 64
End: 2024-12-16

## 2024-12-16 NOTE — TELEPHONE ENCOUNTER
LVM for pt.  Dr Murray reviewed her chart, we are canceling 1/6 10:30 AM, he is referring her to physiatry for leonor hand pain.  Physiatry dept [phone].  Please call our office to confirm receipt of this message, [phone].

## 2025-02-07 NOTE — PROGRESS NOTES
[facility], [facility], [place]  [address]  [facility] [address]  Dept: [phone]       Patient:  Abigail Andrade  :      1960  MRN:      [id number]    Chief Complaint:    Chief Complaint   Patient presents with    Follow - Up    Weight Management       SUBJECTIVE     History of Present Illness:  Abigail is being seen today for a follow-up for non surgical weight loss.     Past Medical History:   Past Medical History:    Essential hypertension    Fibroids    Genital herpes simplex    Genital warts    Glaucoma    17 1st visit- was on Cosopt bid OU and Latanoprost qhs OU- 17 normal OCT and VF, so D/C Cosopt, but continue Latanoprost qhs OU, 17- stable IOP, so D/C Latanoprost qhs OU, 18 RESUME LATANOPROST QHS OU DUE TO IOP OF 22/26 AND PT CONCERN    Herpes simplex    vaginal    Human papilloma virus infection    Hypothyroidism    Neutropenia    saw hematologist 2 years ago, told was nl, from family     Obesity (BMI 30-39.9)    Sexually transmitted disease    Vaginal dryness, menopausal        Comorbidities:  Back pain-Improvement?  yes, Joint pain-Improvement?  yes, Hypertension-Improvement?  yes and SHUKRI-Improvement?  yes    OBJECTIVE     Vitals: /68 (BP Location: Left arm, Patient Position: Sitting, Cuff Size: adult)   Pulse 87   Ht 5' 8\" (1.727 m)   Wt 161 lb 8 oz (73.3 kg)   LMP 10/01/2013 (Within Weeks)   SpO2 98%   BMI 24.56 kg/m²     Initial weight loss:-06   Total weight loss: -41   Start weight: 203    Wt Readings from Last 3 Encounters:   25 161 lb 8 oz (73.3 kg)   24 167 lb 9.6 oz (76 kg)   24 167 lb (75.8 kg)       Patient Medications:    Current Outpatient Medications   Medication Sig Dispense Refill    valACYclovir 1 G Oral Tab Take 1 tablet (1,000 mg total) by mouth every 12 (twelve) hours as needed. 90 tablet 3    levocetirizine 5 MG Oral Tab Take 1 tablet (5 mg total) by mouth every  evening. 90 tablet 1    Phentermine HCl 37.5 MG Oral Tab Take 1 tablet (37.5 mg total) by mouth daily with breakfast. 30 tablet 5    losartan-hydroCHLOROthiazide 50-12.5 MG Oral Tab Take 0.5 tablets by mouth daily. Physical due 2023 45 tablet 3    latanoprost 0.005 % Ophthalmic Solution       dorzolamide 2 % Ophthalmic Solution Apply 1 drop to eye As Directed.      DICLOFENAC 1 % External Gel APPLY 2 GRAMS TOPICALLY 3 TIMES A DAY AS NEEDED 200 g 0    SYNTHROID 75 MCG Oral Tab        Allergies:  Dust mite extract     Social History:    Social History     Socioeconomic History    Marital status: Single     Spouse name: Not on file    Number of children: Not on file    Years of education: Not on file    Highest education level: Not on file   Occupational History    Not on file   Tobacco Use    Smoking status: Never    Smokeless tobacco: Never    Tobacco comments:     na   Vaping Use    Vaping status: Never Used   Substance and Sexual Activity    Alcohol use: No    Drug use: No    Sexual activity: Not Currently   Other Topics Concern    Caffeine Concern Yes    Exercise Yes    Seat Belt No    Special Diet No    Stress Concern No    Weight Concern No   Social History Narrative    Lives alone     No abuse     Social Drivers of Health     Food Insecurity: Not on file   Transportation Needs: Not on file   Stress: Not on file   Housing Stability: Not on file     Surgical History:    Past Surgical History:   Procedure Laterality Date    Colposcopy, cervix w/upper adjacent vagina; w/biopsy(s), cervix      Exam of vagina,colposcopy      positive HPV    Insert intrauterine device                Other surgical history  ONLY -removed Fallopian Tube Removal        Removal of fallopian tube Bilateral 2013    ovaries appeared normal    Remove intrauterine device          Tubal ligation       Family History:    Family History   Problem Relation Age of Onset    Other (Other) Father         liver cirrhosis     Hypertension Mother     Glaucoma Mother     Heart Disorder Mother         AFIB  Pace Maker    Other (Other) Mother     Glaucoma Sister     Other (Other) Sister         Colon polyps    Heart Disorder Sister         stent placement    Diabetes Neg     Macular degeneration Neg     Cancer Neg     Bleeding Disorders Neg     Clotting Disorder Neg        Food Journal  Reviewed and Discussed:       Patient has a Food Journal?: yes   Patient is reading nutrition labels?  yes  Average Caloric Intake:     Average CHO Intake: 100  Is patient exercising? yes  Type of exercise? Walking  Weights at home    Eating Habits  Patient states the following:  Eats 3 meal(s) per day  Length of time it takes to consume a meal:  20  # of snacks per day: 1 Type of snacks:  Snap peas,  fruit  Amount of soda consumption per day:    Amount of water (in ounces) per day:  64  Drinking between meals only:  yes  Toughest challenge:    Nutritional Goals  Limit carbohydrates to 100 gms per day, Eat 100-200 calories within 1 hour of waking  and Eat 3-4 cups of fresh fruits or vegetables daily    Behavior Modifications Reviewed and Discussed  Eat breakfast, Eat 3 meals per day, Plan meals in advance, Read nutrition labels, Drink 64 oz of water per day, Maintain a daily food journal, No drinking 30 minutes before or after meals, Utlize portion control strategies to reduce calorie intake, Identify triggers for eating and manage cues and Eat slowly and take 20 to 30 minutes to complete each meal    Exercise Goals Reviewed and Discussed        ROS:    Constitutional: negative  Respiratory: negative  Cardiovascular: negative  Gastrointestinal: negative  Musculoskeletal:positive for arthralgias and back pain  Neurological: negative  Behavioral/Psych: negative  Endocrine: negative  All other systems were reviewed and are negative    Physical Exam:   General appearance: alert, appears stated age, cooperative and mildly obese  Head: Normocephalic, without  obvious abnormality, atraumatic  Back: symmetric, no curvature. ROM normal. No CVA tenderness.  Lungs: clear to auscultation bilaterally  Heart: S1, S2 normal, no murmur, click, rub or gallop, regular rate and rhythm  Abdomen: soft, non-tender; bowel sounds normal; no masses,  no organomegaly  Extremities: extremities normal, atraumatic, no cyanosis or edema  Pulses: 2+ and symmetric  Neurologic: Grossly normal    ASSESSMENT     HYPERTENSION:  The patient's blood pressure has been well controlled.  she has been checking it as instructed and has remained in relatively good control.    Encounter Diagnosis(ses):   Encounter Diagnoses   Name Primary?    HTN (hypertension), benign Yes    Stress     Encounter for therapeutic drug monitoring        PLAN     Patient is not interested in bariatric surgery. Patient desires to pursue traditional weight loss at this time.      HYPERTENSION: Blood pressure stable on the above medications. No interval change in antihypertensive medication.     Goals for next month:  1. Keep a food log.  2. Drink 48-64 ounces of non-caloric beverages per day. No fruit juices or regular soda.  3. Increase activity-upper body exercises, walk 10 minutes per day.  4. Increase fruit and vegetable servings to 5-6 per day.      Tolerating Phentermine  ekg done      Needs to follow up with PCP    Diagnoses and all orders for this visit:    HTN (hypertension), benign    Stress    Encounter for therapeutic drug monitoring          Daniel Mack MD

## (undated) NOTE — MR AVS SNAPSHOT
After Visit Summary   11/22/2021    Rebeca Mike   MRN: [id number]           Visit Information     Date & Time  11/22/2021 10:30 AM Provider  David Luong MD [address] [facility] Dept.  Phone  33 such as allergies, back pain and cold symptoms? Skip the drive and waiting room and online chat with a doctor face-to-face using your web-cam enabled computer or mobile device wherever you are.  Video Visits cost $50 and can be paid hassle-free using a cred

## (undated) NOTE — MR AVS SNAPSHOT
[facility] at [address] [place] [address] Road [address] [phone]               Thank you for choosing us for your health care visit with [place] [address] NURSE.   We are glad to serve you and happy to provide you Take 1 tablet by mouth daily. Patient takes half daily                   Results of Recent Testing       MyChart     Sign up for [facility], your secure online medical record.   [facility] will allow you to access patient instructions from your recent visit,  vie

## (undated) NOTE — LETTER
08/29/18        Selvin Poag  [address] [facility]      Dear Andrea Cole records indicate that you have outstanding lab work and or testing that was ordered for you and has not yet been completed:          CBC W Differential W

## (undated) NOTE — MR AVS SNAPSHOT
[facility] at [address] [place], [place] [id number] [phone]               Thank you for choosing us for your health care visit with Peter Camacho MD.  We are glad to serve you and happy to Your physician has referred you to a specialist.  Your physician or the clinic staff will provide you with the phone number you should call to schedule your appointment.      If you are confident that your benefit plan will not require a referral or authori Commonly known as:  VALTREX           Valsartan-Hydrochlorothiazide 80-12.5 MG Tabs   Take 1 tablet by mouth daily.  Patient takes half daily                Where to Get Your Medications      These medications were sent to [id number] [place]

## (undated) NOTE — MR AVS SNAPSHOT
After Visit Summary   10/28/2020    Lizy Alvares    MRN: [id number]           Visit Information     Date & Time  10/28/2020  9:45 AM Provider  Shruthi Flores, [facility], [facility] Dept.  Phone  [phone] 11/2/2020 11:00 AM [facility] [address] [facility]    11/9/2020 8:30 AM [facility]    11/19/2020 5:30 PM Kenzie Newton [address], [place]      Follow-up Instructions If you receive a survey from [employer], please take a few minutes to complete it and provide feedback. We strive to deliver the best patient experience and are looking for ways to make improvements. Your feedback will help us do so.  For more information EMERGENCY ROOM Life-threatening emergencies needing immediate intervention at a hospital emergency room.  Average cost  $2,300*   *Cost varies based on your insurance coverage  For more information about hours, locations or appointment options available at

## (undated) NOTE — LETTER
11/21/2018              Speedy Fang, [address]        [facility]         Dear Jonn Razo,    [facility] records indicate that the tests ordered for you by Joseline Cagle MD  have not been done.   If you have, in fact, already complete

## (undated) NOTE — MR AVS SNAPSHOT
[facility] acute hospital [facility] at [address] [place] [address] Road [address] [phone]               Thank you for choosing us for your health care visit with Kurt Davis MD.  We are glad to serve you and happy to Commonly known as:  NASONEX           SYNTHROID 75 MCG Tabs   Generic drug:  Levothyroxine Sodium   Take 1 tablet (75 mcg total) by mouth before breakfast.           ValACYclovir HCl 500 MG Tabs   Take 1 tablet (500 mg total) by mouth 3 (three) times daily

## (undated) NOTE — Clinical Note
Lily 10, 2017    Nicola Gomez MD  [address] [facility], [address] [facility]     Patient: Dali Muse   YOB: 1960   Date of Visit: 6/10/2017       Dear Dr. Woodroe Alpers, MD:    Thank you for referring Carmella Mejía to me fo SYNTHROID 75 MCG Oral Tab Take 1 tablet (75 mcg total) by mouth before breakfast. Disp: 90 tablet Rfl: 1   Dorzolamide HCl-Timolol Mal 22.3-6.8 MG/ML Ophthalmic Solution 1 drop 2 (two) times daily.  Disp:  Rfl:    ValACYclovir HCl 500 MG Oral Tab Take 1 tab Macula Normal Normal    Vessels Normal Normal    Periphery Normal Normal            Refraction     Wearing Rx      Sphere Cylinder Axis Add   Right +0.25 +0.50 040 2.00   Left +0.25 +0.50 020 2.00       Type:  Progressive bifocal      Manifest Refraction

## (undated) NOTE — ED AVS SNAPSHOT
Maggy Mayfield   MRN: [id number]    Department:  [facility] Emergency Department   Date of Visit:  7/15/2019           Disclosure     Insurance plans vary and the physician(s) referred by the ER may not be covered by your plan.  Please contact y CARE PHYSICIAN AT ONCE OR RETURN IMMEDIATELY TO THE EMERGENCY DEPARTMENT. If you have been prescribed any medication(s), please fill your prescription right away and begin taking the medication(s) as directed.   If you believe that any of the medications

## (undated) NOTE — MR AVS SNAPSHOT
[place]iffany  [address]  [phone]               Thank you for choosing us for your health care visit with Emma Gonzales MD.  We are glad to serve you and happy to provide you with this summa Take 1 tablet by mouth daily.  Patient takes half daily                   Results of Recent Testing     OCT, OPTIC NERVE - OU - BOTH EYES           MORTON VISUAL FIELD - OU - BOTH EYES             [facility]     Sign up for [employer], your secure online medica HOW TO GET STARTED: HOW TO STAY MOTIVATED:   Start activities slowly and build up over time Do what you like   Get your heart pumping – brisk walking, biking, swimming Even 10 minute increments are effective and add up over the week   2 ½ hours per week –

## (undated) NOTE — ED AVS SNAPSHOT
Michelle Shafer   MRN: [id number]    Department:  [facility] Emergency Department   Date of Visit:  9/22/2019           Disclosure     Insurance plans vary and the physician(s) referred by the ER may not be covered by your plan.  Please contact y CARE PHYSICIAN AT ONCE OR RETURN IMMEDIATELY TO THE EMERGENCY DEPARTMENT. If you have been prescribed any medication(s), please fill your prescription right away and begin taking the medication(s) as directed.   If you believe that any of the medications

## (undated) NOTE — Clinical Note
Thank you for the consult. I saw Ms. Mars Liu in the endocrine/diabetes clinic today. Please see attached my note. Please feel free to contact me with any questions. Thanks!

## (undated) NOTE — MR AVS SNAPSHOT
[facility] at [address] [place] [address] Road [address] [phone]               Thank you for choosing us for your health care visit with Anton An MD.  We are glad to serve you and happy to This list is accurate as of: 2/20/17  9:41 PM.  Always use your most recent med list.                latanoprost 0.005 % Soln   Commonly known as:  XALATAN           SYNTHROID 75 MCG Tabs   Generic drug:  Levothyroxine Sodium           ValACYclovir HCl 500 TSH [E]    Complete by:  Feb 20, 2017 (Approximate)    Assoc Dx:  Acquired hypothyroidism [E03.9]           Occult Blood, Fecal, Immunoassay [E]    Complete by:  Feb 20, 2017    Assoc Dx:  Essential hypertension [I10]           Vitamin D, 25-Hydroxy    [place], [address], [address]     [phone]    [address]  [address], [address]  [address]  [address] [facility], [address]     [phone]    [facility] authorization numbers or be assured that none are required. You can then schedule your appointment. Failure to obtain required authorization numbers can create reimbursement difficulties for you. Assoc Dx:   Open-angle glaucoma of left eye, unspecified g

## (undated) NOTE — LETTER
No referring provider defined for this encounter. 06/13/19        Patient: Nadya Veras   YOB: 1960   Date of Visit: 6/13/2019       Dear  Dr. Mark Ashley MD,      Thank you for referring Nadya Veras to my practice.   Please find

## (undated) NOTE — Clinical Note
6/20/2017            Lima James       Dear Yomaira Enriquez,    The visual field test you took on 6/17/2017 indicated normal field of vision in both eyes.   An optic nerve analysis showed normal retinal nerv

## (undated) NOTE — MR AVS SNAPSHOT
[place]iffany  [address]  [phone]               Thank you for choosing us for your health care visit with Kiana Griffin MD.  We are glad to serve you and happy to provide you with this summa Latanoprost; one drop in both eyes at bedtime and Dorzolamide- Timolol twice a day in both eyes. (she has enough drops and does not need them refilled at this time)   Retinal photos taken today to document optic nerves. Glaucoma diagnostic testing ordered. not sign up before the expiration date, you must request a new code. Your unique [place] Access Code: [email or web address]  Expires: 8/9/2017  9:36 AM    If you have questions, you can call [phone] to talk to our [facility] Staff.  Remember, [facility]